# Patient Record
Sex: MALE | Race: BLACK OR AFRICAN AMERICAN | NOT HISPANIC OR LATINO | Employment: UNEMPLOYED | URBAN - METROPOLITAN AREA
[De-identification: names, ages, dates, MRNs, and addresses within clinical notes are randomized per-mention and may not be internally consistent; named-entity substitution may affect disease eponyms.]

---

## 2021-04-11 ENCOUNTER — HOSPITAL ENCOUNTER (OUTPATIENT)
Facility: HOSPITAL | Age: 51
Setting detail: OBSERVATION
Discharge: HOME | End: 2021-04-12
Attending: EMERGENCY MEDICINE | Admitting: FAMILY MEDICINE
Payer: COMMERCIAL

## 2021-04-11 DIAGNOSIS — I95.2 HYPOTENSION DUE TO DRUGS: ICD-10-CM

## 2021-04-11 DIAGNOSIS — N17.9 ACUTE-ON-CHRONIC KIDNEY INJURY (HCC): ICD-10-CM

## 2021-04-11 DIAGNOSIS — N18.9 ACUTE-ON-CHRONIC KIDNEY INJURY (HCC): ICD-10-CM

## 2021-04-11 DIAGNOSIS — I10 ESSENTIAL HYPERTENSION: ICD-10-CM

## 2021-04-11 DIAGNOSIS — N17.9 AKI (ACUTE KIDNEY INJURY) (HCC): ICD-10-CM

## 2021-04-11 DIAGNOSIS — R55 NEAR SYNCOPE: Primary | ICD-10-CM

## 2021-04-11 PROCEDURE — 99285 EMERGENCY DEPT VISIT HI MDM: CPT

## 2021-04-12 ENCOUNTER — APPOINTMENT (EMERGENCY)
Dept: RADIOLOGY | Facility: HOSPITAL | Age: 51
End: 2021-04-12
Payer: COMMERCIAL

## 2021-04-12 VITALS
SYSTOLIC BLOOD PRESSURE: 136 MMHG | OXYGEN SATURATION: 99 % | DIASTOLIC BLOOD PRESSURE: 89 MMHG | HEART RATE: 91 BPM | TEMPERATURE: 97.3 F | RESPIRATION RATE: 17 BRPM

## 2021-04-12 PROBLEM — N17.9 ACUTE-ON-CHRONIC KIDNEY INJURY (HCC): Status: ACTIVE | Noted: 2021-04-12

## 2021-04-12 PROBLEM — I10 ESSENTIAL HYPERTENSION: Status: ACTIVE | Noted: 2021-04-12

## 2021-04-12 PROBLEM — K21.9 GERD (GASTROESOPHAGEAL REFLUX DISEASE): Status: ACTIVE | Noted: 2021-04-12

## 2021-04-12 PROBLEM — I95.9 HYPOTENSION: Status: ACTIVE | Noted: 2021-04-12

## 2021-04-12 PROBLEM — N18.9 ACUTE-ON-CHRONIC KIDNEY INJURY (HCC): Status: ACTIVE | Noted: 2021-04-12

## 2021-04-12 LAB
ALBUMIN SERPL BCP-MCNC: 3.6 G/DL (ref 3.5–5)
ALBUMIN SERPL BCP-MCNC: 3.7 G/DL (ref 3.5–5)
ALP SERPL-CCNC: 60 U/L (ref 46–116)
ALP SERPL-CCNC: 63 U/L (ref 46–116)
ALT SERPL W P-5'-P-CCNC: 20 U/L (ref 12–78)
ALT SERPL W P-5'-P-CCNC: 22 U/L (ref 12–78)
ANION GAP SERPL CALCULATED.3IONS-SCNC: 10 MMOL/L (ref 4–13)
ANION GAP SERPL CALCULATED.3IONS-SCNC: 7 MMOL/L (ref 4–13)
AST SERPL W P-5'-P-CCNC: 11 U/L (ref 5–45)
AST SERPL W P-5'-P-CCNC: 21 U/L (ref 5–45)
ATRIAL RATE: 70 BPM
BACTERIA UR QL AUTO: NORMAL /HPF
BASOPHILS # BLD AUTO: 0.06 THOUSANDS/ΜL (ref 0–0.1)
BASOPHILS NFR BLD AUTO: 0 % (ref 0–1)
BILIRUB SERPL-MCNC: 0.49 MG/DL (ref 0.2–1)
BILIRUB SERPL-MCNC: 0.66 MG/DL (ref 0.2–1)
BILIRUB UR QL STRIP: NEGATIVE
BUN SERPL-MCNC: 24 MG/DL (ref 5–25)
BUN SERPL-MCNC: 24 MG/DL (ref 5–25)
CALCIUM SERPL-MCNC: 8.6 MG/DL (ref 8.3–10.1)
CALCIUM SERPL-MCNC: 9.5 MG/DL (ref 8.3–10.1)
CHLORIDE SERPL-SCNC: 102 MMOL/L (ref 100–108)
CHLORIDE SERPL-SCNC: 103 MMOL/L (ref 100–108)
CLARITY UR: CLEAR
CO2 SERPL-SCNC: 24 MMOL/L (ref 21–32)
CO2 SERPL-SCNC: 29 MMOL/L (ref 21–32)
COLOR UR: YELLOW
CREAT SERPL-MCNC: 1.61 MG/DL (ref 0.6–1.3)
CREAT SERPL-MCNC: 1.92 MG/DL (ref 0.6–1.3)
EOSINOPHIL # BLD AUTO: 0.66 THOUSAND/ΜL (ref 0–0.61)
EOSINOPHIL NFR BLD AUTO: 4 % (ref 0–6)
ERYTHROCYTE [DISTWIDTH] IN BLOOD BY AUTOMATED COUNT: 13.7 % (ref 11.6–15.1)
ERYTHROCYTE [DISTWIDTH] IN BLOOD BY AUTOMATED COUNT: 13.8 % (ref 11.6–15.1)
FINE GRAN CASTS URNS QL MICRO: NORMAL /LPF
GFR SERPL CREATININE-BSD FRML MDRD: 46 ML/MIN/1.73SQ M
GFR SERPL CREATININE-BSD FRML MDRD: 57 ML/MIN/1.73SQ M
GLUCOSE SERPL-MCNC: 105 MG/DL (ref 65–140)
GLUCOSE SERPL-MCNC: 120 MG/DL (ref 65–140)
GLUCOSE UR STRIP-MCNC: NEGATIVE MG/DL
HCT VFR BLD AUTO: 45 % (ref 36.5–49.3)
HCT VFR BLD AUTO: 47.3 % (ref 36.5–49.3)
HGB BLD-MCNC: 14.1 G/DL (ref 12–17)
HGB BLD-MCNC: 15.2 G/DL (ref 12–17)
HGB UR QL STRIP.AUTO: NEGATIVE
IMM GRANULOCYTES # BLD AUTO: 0.11 THOUSAND/UL (ref 0–0.2)
IMM GRANULOCYTES NFR BLD AUTO: 1 % (ref 0–2)
KETONES UR STRIP-MCNC: ABNORMAL MG/DL
LEUKOCYTE ESTERASE UR QL STRIP: NEGATIVE
LYMPHOCYTES # BLD AUTO: 3.21 THOUSANDS/ΜL (ref 0.6–4.47)
LYMPHOCYTES NFR BLD AUTO: 20 % (ref 14–44)
MCH RBC QN AUTO: 25.8 PG (ref 26.8–34.3)
MCH RBC QN AUTO: 26.2 PG (ref 26.8–34.3)
MCHC RBC AUTO-ENTMCNC: 31.3 G/DL (ref 31.4–37.4)
MCHC RBC AUTO-ENTMCNC: 32.1 G/DL (ref 31.4–37.4)
MCV RBC AUTO: 82 FL (ref 82–98)
MCV RBC AUTO: 82 FL (ref 82–98)
MONOCYTES # BLD AUTO: 1.51 THOUSAND/ΜL (ref 0.17–1.22)
MONOCYTES NFR BLD AUTO: 9 % (ref 4–12)
NEUTROPHILS # BLD AUTO: 10.89 THOUSANDS/ΜL (ref 1.85–7.62)
NEUTS SEG NFR BLD AUTO: 66 % (ref 43–75)
NITRITE UR QL STRIP: NEGATIVE
NON-SQ EPI CELLS URNS QL MICRO: NORMAL /HPF
NRBC BLD AUTO-RTO: 0 /100 WBCS
P AXIS: 74 DEGREES
PH UR STRIP.AUTO: 5.5 [PH] (ref 4.5–8)
PLATELET # BLD AUTO: 259 THOUSANDS/UL (ref 149–390)
PLATELET # BLD AUTO: 288 THOUSANDS/UL (ref 149–390)
PMV BLD AUTO: 10.2 FL (ref 8.9–12.7)
PMV BLD AUTO: 10.3 FL (ref 8.9–12.7)
POTASSIUM SERPL-SCNC: 4.6 MMOL/L (ref 3.5–5.3)
POTASSIUM SERPL-SCNC: 4.8 MMOL/L (ref 3.5–5.3)
PR INTERVAL: 214 MS
PROT SERPL-MCNC: 6.9 G/DL (ref 6.4–8.2)
PROT SERPL-MCNC: 7.3 G/DL (ref 6.4–8.2)
PROT UR STRIP-MCNC: >=300 MG/DL
QRS AXIS: 0 DEGREES
QRSD INTERVAL: 94 MS
QT INTERVAL: 412 MS
QTC INTERVAL: 444 MS
RBC # BLD AUTO: 5.47 MILLION/UL (ref 3.88–5.62)
RBC # BLD AUTO: 5.8 MILLION/UL (ref 3.88–5.62)
RBC #/AREA URNS AUTO: NORMAL /HPF
SODIUM SERPL-SCNC: 137 MMOL/L (ref 136–145)
SODIUM SERPL-SCNC: 138 MMOL/L (ref 136–145)
SP GR UR STRIP.AUTO: >=1.03 (ref 1–1.03)
T WAVE AXIS: 16 DEGREES
TROPONIN I SERPL-MCNC: <0.02 NG/ML
TROPONIN I SERPL-MCNC: <0.02 NG/ML
UROBILINOGEN UR QL STRIP.AUTO: 0.2 E.U./DL
VENTRICULAR RATE: 70 BPM
WBC # BLD AUTO: 16.4 THOUSAND/UL (ref 4.31–10.16)
WBC # BLD AUTO: 16.44 THOUSAND/UL (ref 4.31–10.16)
WBC #/AREA URNS AUTO: NORMAL /HPF

## 2021-04-12 PROCEDURE — 84484 ASSAY OF TROPONIN QUANT: CPT | Performed by: EMERGENCY MEDICINE

## 2021-04-12 PROCEDURE — 80053 COMPREHEN METABOLIC PANEL: CPT | Performed by: PHYSICIAN ASSISTANT

## 2021-04-12 PROCEDURE — 80053 COMPREHEN METABOLIC PANEL: CPT | Performed by: EMERGENCY MEDICINE

## 2021-04-12 PROCEDURE — 96365 THER/PROPH/DIAG IV INF INIT: CPT

## 2021-04-12 PROCEDURE — 85025 COMPLETE CBC W/AUTO DIFF WBC: CPT | Performed by: EMERGENCY MEDICINE

## 2021-04-12 PROCEDURE — 81001 URINALYSIS AUTO W/SCOPE: CPT

## 2021-04-12 PROCEDURE — 93005 ELECTROCARDIOGRAM TRACING: CPT

## 2021-04-12 PROCEDURE — 85027 COMPLETE CBC AUTOMATED: CPT | Performed by: PHYSICIAN ASSISTANT

## 2021-04-12 PROCEDURE — 99285 EMERGENCY DEPT VISIT HI MDM: CPT | Performed by: EMERGENCY MEDICINE

## 2021-04-12 PROCEDURE — 99236 HOSP IP/OBS SAME DATE HI 85: CPT | Performed by: FAMILY MEDICINE

## 2021-04-12 PROCEDURE — 36415 COLL VENOUS BLD VENIPUNCTURE: CPT | Performed by: EMERGENCY MEDICINE

## 2021-04-12 PROCEDURE — 71045 X-RAY EXAM CHEST 1 VIEW: CPT

## 2021-04-12 PROCEDURE — 93010 ELECTROCARDIOGRAM REPORT: CPT | Performed by: INTERNAL MEDICINE

## 2021-04-12 PROCEDURE — 96361 HYDRATE IV INFUSION ADD-ON: CPT

## 2021-04-12 RX ORDER — PANTOPRAZOLE SODIUM 40 MG/1
40 TABLET, DELAYED RELEASE ORAL
Status: DISCONTINUED | OUTPATIENT
Start: 2021-04-12 | End: 2021-04-12 | Stop reason: HOSPADM

## 2021-04-12 RX ORDER — ATORVASTATIN CALCIUM 40 MG/1
40 TABLET, FILM COATED ORAL
Status: DISCONTINUED | OUTPATIENT
Start: 2021-04-12 | End: 2021-04-12 | Stop reason: HOSPADM

## 2021-04-12 RX ORDER — ASPIRIN 81 MG/1
81 TABLET, CHEWABLE ORAL DAILY
Status: DISCONTINUED | OUTPATIENT
Start: 2021-04-12 | End: 2021-04-12 | Stop reason: HOSPADM

## 2021-04-12 RX ORDER — SPIRONOLACTONE 25 MG/1
50 TABLET ORAL DAILY
Refills: 0
Start: 2021-04-12

## 2021-04-12 RX ORDER — CARVEDILOL 25 MG/1
25 TABLET ORAL 2 TIMES DAILY WITH MEALS
Qty: 60 TABLET | Refills: 0 | Status: SHIPPED | OUTPATIENT
Start: 2021-04-12 | End: 2022-05-24

## 2021-04-12 RX ORDER — ALBUMIN, HUMAN INJ 5% 5 %
25 SOLUTION INTRAVENOUS ONCE
Status: COMPLETED | OUTPATIENT
Start: 2021-04-12 | End: 2021-04-12

## 2021-04-12 RX ORDER — LOSARTAN POTASSIUM AND HYDROCHLOROTHIAZIDE 12.5; 1 MG/1; MG/1
1 TABLET ORAL DAILY
Refills: 0
Start: 2021-04-12

## 2021-04-12 RX ORDER — LOSARTAN POTASSIUM 100 MG/1
100 TABLET ORAL EVERY MORNING
Refills: 0
Start: 2021-04-12 | End: 2021-04-12 | Stop reason: HOSPADM

## 2021-04-12 RX ORDER — SODIUM CHLORIDE 9 MG/ML
125 INJECTION, SOLUTION INTRAVENOUS CONTINUOUS
Status: DISCONTINUED | OUTPATIENT
Start: 2021-04-12 | End: 2021-04-12 | Stop reason: HOSPADM

## 2021-04-12 RX ORDER — SODIUM CHLORIDE 9 MG/ML
100 INJECTION, SOLUTION INTRAVENOUS CONTINUOUS
Status: DISPENSED | OUTPATIENT
Start: 2021-04-12 | End: 2021-04-12

## 2021-04-12 RX ORDER — HEPARIN SODIUM 5000 [USP'U]/ML
5000 INJECTION, SOLUTION INTRAVENOUS; SUBCUTANEOUS EVERY 8 HOURS SCHEDULED
Status: DISCONTINUED | OUTPATIENT
Start: 2021-04-12 | End: 2021-04-12 | Stop reason: HOSPADM

## 2021-04-12 RX ORDER — ONDANSETRON 2 MG/ML
4 INJECTION INTRAMUSCULAR; INTRAVENOUS EVERY 6 HOURS PRN
Status: DISCONTINUED | OUTPATIENT
Start: 2021-04-12 | End: 2021-04-12 | Stop reason: HOSPADM

## 2021-04-12 RX ORDER — MAGNESIUM HYDROXIDE/ALUMINUM HYDROXICE/SIMETHICONE 120; 1200; 1200 MG/30ML; MG/30ML; MG/30ML
30 SUSPENSION ORAL EVERY 6 HOURS PRN
Status: DISCONTINUED | OUTPATIENT
Start: 2021-04-12 | End: 2021-04-12 | Stop reason: HOSPADM

## 2021-04-12 RX ORDER — ACETAMINOPHEN 325 MG/1
650 TABLET ORAL EVERY 6 HOURS PRN
Status: DISCONTINUED | OUTPATIENT
Start: 2021-04-12 | End: 2021-04-12 | Stop reason: HOSPADM

## 2021-04-12 RX ADMIN — ALBUMIN (HUMAN) 25 G: 12.5 INJECTION, SOLUTION INTRAVENOUS at 02:15

## 2021-04-12 RX ADMIN — SODIUM CHLORIDE 1000 ML: 0.9 INJECTION, SOLUTION INTRAVENOUS at 00:38

## 2021-04-12 RX ADMIN — SODIUM CHLORIDE 100 ML/HR: 0.9 INJECTION, SOLUTION INTRAVENOUS at 05:33

## 2021-04-12 RX ADMIN — HEPARIN SODIUM 5000 UNITS: 5000 INJECTION INTRAVENOUS; SUBCUTANEOUS at 05:41

## 2021-04-12 RX ADMIN — MIDODRINE HYDROCHLORIDE 7.5 MG: 2.5 TABLET ORAL at 02:16

## 2021-04-12 RX ADMIN — PANTOPRAZOLE SODIUM 40 MG: 40 TABLET, DELAYED RELEASE ORAL at 05:41

## 2021-04-12 RX ADMIN — SODIUM CHLORIDE 1000 ML: 0.9 INJECTION, SOLUTION INTRAVENOUS at 01:39

## 2021-04-12 RX ADMIN — ASPIRIN 81 MG CHEWABLE TABLET 81 MG: 81 TABLET CHEWABLE at 09:23

## 2021-04-12 RX ADMIN — ACETAMINOPHEN 650 MG: 325 TABLET, FILM COATED ORAL at 09:23

## 2021-04-12 NOTE — QUICK NOTE
Pts BP increased to 134/96  Patient clinically feeling much better  Did not receive 500cc NS bolus yet  Will d/c 500cc bolus and midodrine at this time to see how patient's BP responds without alpha agonist support  Will give 500cc infusion at 100cc/hr instead  I suspect his liver has metabolized a majority of the sildenafil he ingested as the half life for sildenafil is 4 hours   Continue to monitor kidney function for resolution of BEN

## 2021-04-12 NOTE — ASSESSMENT & PLAN NOTE
Patient presents with hypotension 69/54, after taking sildenafil and carvedilol 50 mg  Reports blurry vision with bright lights and lightheadedness/presyncope  Also reports taking losartan-HCTZ 100-12 5mg, spironolactone 50 mg, nortriptyline 25mg, and ativan 0 5mg in the am   This is likely due to polypharmacy with multiple antihypertensives  No signs of infection  No known heart failure  With all interventions,such as fluid and midodrine as well holding meds that contribute to low BP, his BP was improved in 196Z/871G systolically  Patient has outpatient stress test and echocardiogram ordered  Patient is feeling well  Will discharge home

## 2021-04-12 NOTE — ASSESSMENT & PLAN NOTE
Lab Results   Component Value Date    EGFR 46 04/12/2021    CREATININE 1 92 (H) 04/12/2021   Baseline creatinine between 1 0 and 1 3  Currently 1 92  Patient also states he has a solitary left kidney     Likely prerenal in the setting of hypotension  Received 2 L normal saline, will give an additional 1 L for a total of 3 L  Continue midodrine to raise blood pressure  Follow BMP

## 2021-04-12 NOTE — ASSESSMENT & PLAN NOTE
Patient presents with hypotension 69/54, after taking sildenafil and carvedilol 50 mg  Reports blurry vision with bright lights and lightheadedness/presyncope  Also reports taking losartan-HCTZ 100-12 5mg, spironolactone 50 mg, and ativan 0 5mg in the am   This is likely due to polypharmacy with multiple antihypertensives  No signs of infection  No known heart failure  Receive 2 L and started on midodrine 7 5 mg q 8 H in the ED  BP now 88/49      Plan:  -Continue midodrine 7 5mg q8h  -Will give an additional 1L NS  -Monitor BP closely

## 2021-04-12 NOTE — H&P
[facility]  H&P- Aliyah Alexander 1970, 48 y o  male MRN: [id number]  Unit/Bed#: E4 -01 Encounter: [id number]  Primary Care Provider: Nahid Hein MD   Date and time admitted to hospital: 4/11/2021 11:57 PM    * Hypotension  Assessment & Plan  Patient presents with hypotension 69/54, after taking sildenafil and carvedilol 50 mg  Reports blurry vision with bright lights and lightheadedness/presyncope  Also reports taking losartan-HCTZ 100-12 5mg, spironolactone 50 mg, nortriptyline 25mg, and ativan 0 5mg in the am   This is likely due to polypharmacy with multiple antihypertensives  No signs of infection  No known heart failure  Receive 2 L and started on midodrine 7 5 mg q 8 H in the ED  BP now 88/49  Plan:  -Continue midodrine 7 5mg q8h  -Will give an additional 1L NS  -Monitor BP closely    Acute-on-chronic kidney injury [facility])  Assessment & Plan  Lab Results   Component Value Date    EGFR 46 04/12/2021    CREATININE 1 92 (H) 04/12/2021   Baseline creatinine between 1 0 and 1 3  Currently 1 92  Patient also states he has a solitary left kidney     Likely prerenal in the setting of hypotension  Received 2 L normal saline, will give an additional 1 L for a total of 3 L  Continue midodrine to raise blood pressure  Follow BMP at 0600 and again at 1200    Essential hypertension  Assessment & Plan  Home regimen:  Carvedilol 50 mg BID, losartan-HCTZ 100-12 5 mg daily, spironolactone 50 mg daily  Will hold all antihypertensives in the setting of hypotension and BEN    GERD (gastroesophageal reflux disease)  Assessment & Plan  Continue pantoprazole    VTE Prophylaxis: Heparin  / sequential compression device   Code Status:  Level 1 full code  POLST: There is no POLST form on file for this patient (pre-hospital)  Discussion with family:  Patient    Anticipated Length of Stay:  Patient will be admitted on an Observation basis with an anticipated length of stay of  less 2 midnights  Justification for Hospital Stay:  Hypertension in the setting of sildenafil and antihypertensives    Total Time for Visit, including Counseling / Coordination of Care: 30 minutes  Greater than 50% of this total time spent on direct patient counseling and coordination of care  Chief Complaint:   Hypertension    History of Present Illness:    Deborah Rosenberg is a 48 y o  male with PMH HTN, CKD with solitary kidney who presents with hypertension to 60s/50s following the use of sildenafil  Patient states that he took his carvedilol, Hyzaar, spironolactone this morning  He felt fine all day  Tonight, patient took his nightly dose of carvedilol along with sildenafil  Shortly after, patient started to have visual changes including blurry vision and bright lights  He also felt very lightheaded  He states he poured cold water bottles on his face which helped briefly with his vision, but then his visual changes returned a minute later  He was concerned he was going blind and called EMS  EMS found him hypotensive and brought into the ED  In the ED, blood pressure was 69/54  He was given 2 L bolus with minimal response  He was then started on midodrine 7 5 mg q 8 H and given albumin with mild response, BP now 80s over 60s  Lab work revealed BEN in the setting of hypotension  Patient denies chest pain, shortness of breath, nausea, vomiting, numbness, changes in bowel habits in urinary habits  Review of Systems:    Review of Systems   Constitutional: Positive for fatigue  Negative for appetite change, chills and fever  HENT: Negative for ear pain, sore throat and trouble swallowing  Eyes: Positive for visual disturbance (bright lights and blurry vision)  Respiratory: Negative for cough, chest tightness, shortness of breath and wheezing  Cardiovascular: Negative for chest pain, palpitations and leg swelling     Gastrointestinal: Negative for abdominal distention, abdominal pain, diarrhea, nausea and vomiting  Endocrine: Negative  Genitourinary: Negative for dysuria  Musculoskeletal: Negative for gait problem and myalgias  Skin: Negative for pallor  Allergic/Immunologic: Negative for immunocompromised state  Neurological: Positive for weakness and light-headedness  Negative for dizziness, syncope, numbness and headaches  Past Medical and Surgical History:     Past Medical History:   Diagnosis Date    Eosinophilic esophagitis     Hyperlipidemia     Hypertension        Past Surgical History:   Procedure Laterality Date    COLONOSCOPY  08/04/2015    Normal by Dr Ari Nguyen at [facility]  12/16/2016    Diverticulosis otherwise normal by Dr Hickman [facility] Right     Renal cell carcinoma    UPPER GASTROINTESTINAL ENDOSCOPY  08/2020    Biopsy positive for eosinophilic esophagitis       Meds/Allergies:    Prior to Admission medications    Medication Sig Start Date End Date Taking?  Authorizing Provider   amLODIPine (NORVASC) 5 mg tablet Take 5 mg by mouth daily 2/18/21 2/18/22 Yes Historical Provider, MD   ascorbic acid (VITAMIN C) 500 MG tablet Take 500 mg by mouth daily   Yes Historical Provider, MD   aspirin 81 mg chewable tablet Chew   Yes Historical Provider, MD   atorvastatin (LIPITOR) 40 mg tablet Take 40 mg by mouth daily   Yes Historical Provider, MD   carvedilol (COREG) 25 mg tablet Take 25 mg by mouth 8/21/20 8/21/21 Yes Historical Provider, MD   hydrochlorothiazide (HYDRODIURIL) 25 mg tablet Take 25 mg by mouth every morning 7/29/20  Yes Historical Provider, MD   levocetirizine (XYZAL) 5 MG tablet Take 5 mg by mouth every morning 7/29/20  Yes Historical Provider, MD   LORazepam (ATIVAN) 0 5 mg tablet  8/25/20  Yes Historical Provider, MD   losartan (COZAAR) 100 MG tablet Take 100 mg by mouth every morning 7/15/20  Yes Historical Provider, MD   nortriptyline (PAMELOR) 25 mg capsule Take 25 mg by mouth daily 8/5/20 8/5/21 Yes Historical Provider, MD   sildenafil (REVATIO) 20 mg tablet Take 20 mg by mouth daily   Yes Historical Provider, MD   cyclobenzaprine (FLEXERIL) 10 mg tablet Take 10 mg by mouth 3 (three) times a day as needed    Historical Provider, MD   linaCLOtide (Linzess) 290 MCG CAPS Take 290 mcg by mouth daily    Historical Provider, MD   losartan-hydrochlorothiazide (HYZAAR) 100-12 5 MG per tablet Take 1 tablet by mouth daily    Historical Provider, MD   nabumetone (RELAFEN) 750 mg tablet Take 750 mg by mouth daily    Historical Provider, MD   pantoprazole (PROTONIX) 40 mg tablet  8/21/20   Historical Provider, MD   phentermine (ADIPEX-P) 37 5 MG tablet Take 1 tablet by mouth daily    Historical Provider, MD   prochlorperazine (COMPAZINE) 10 mg tablet Take 10 mg by mouth daily as needed    Historical Provider, MD   rizatriptan (MAXALT) 10 MG tablet Take 10 mg by mouth daily as needed 8/5/20   Historical Provider, MD   Sodium Sulfate-Mag Sulfate-KCl (Sutab) 1390-261-100 MG TABS Take 1 kit by mouth see administration instructions Follow-up instructions given at office visit  Patient not taking: Reported on 4/12/2021 3/16/21   Sai KALYANI   ATORVASTATIN CALCIUM PO Take by mouth  4/12/21  Historical Provider, MD   LORAZEPAM PO Take by mouth  4/12/21  Historical Provider, MD   PANTOPRAZOLE SODIUM PO Take by mouth  4/12/21  Historical Provider, MD     I have reviewed home medications with patient personally  Allergies:    Allergies   Allergen Reactions    Sulfamethoxazole-Trimethoprim      Throat swelling      Latex        Social History:     Marital Status: Single   Occupation:  Noncontributory  Patient Pre-hospital Living Situation:  Home  Patient Pre-hospital Level of Mobility:  Independent  Patient Pre-hospital Diet Restrictions:  Low-salt  Substance Use History:   Social History     Substance and Sexual Activity   Alcohol Use Never    Frequency: Never     Social History     Tobacco Use   Smoking Status Current Every Day Smoker    Types: Cigarettes   Smokeless Tobacco Never Used     Social History     Substance and Sexual Activity   Drug Use Not Currently       Family History:    non-contributory    Physical Exam:     Vitals:   Blood Pressure: 134/96 (04/12/21 0511)  Pulse: 73 (04/12/21 0511)  Temperature: (!) 97 4 °F (36 3 °C) (04/12/21 0511)  Temp Source: Temporal (04/12/21 0511)  Respirations: 17 (04/12/21 0511)  SpO2: 94 % (04/12/21 0511)    Physical Exam  Vitals signs (hypotensive) and nursing note reviewed  Constitutional:       Appearance: He is obese  HENT:      Head: Normocephalic and atraumatic  Mouth/Throat:      Mouth: Mucous membranes are moist       Pharynx: Oropharynx is clear  No oropharyngeal exudate  Eyes:      Extraocular Movements: Extraocular movements intact  Cardiovascular:      Rate and Rhythm: Normal rate and regular rhythm  Pulses: Normal pulses  Heart sounds: Normal heart sounds  No murmur  No friction rub  No gallop  Pulmonary:      Effort: Pulmonary effort is normal  No respiratory distress  Breath sounds: Normal breath sounds  No stridor  No wheezing or rales  Abdominal:      General: Abdomen is flat  Bowel sounds are normal  There is no distension  Palpations: Abdomen is soft  Tenderness: There is no abdominal tenderness  Musculoskeletal:      Right lower leg: No edema  Left lower leg: No edema  Skin:     General: Skin is warm and dry  Neurological:      General: No focal deficit present  Mental Status: He is alert and oriented to person, place, and time  Additional Data:     Lab Results: I have personally reviewed pertinent reports        Results from last 7 days   Lab Units 04/12/21  0032   WBC Thousand/uL 16 44*   HEMOGLOBIN g/dL 15 2   HEMATOCRIT % 47 3   PLATELETS Thousands/uL 288   NEUTROS PCT % 66   LYMPHS PCT % 20   MONOS PCT % 9   EOS PCT % 4     Results from last 7 days   Lab Units 04/12/21  0032   SODIUM mmol/L 138 POTASSIUM mmol/L 4 6   CHLORIDE mmol/L 102   CO2 mmol/L 29   BUN mg/dL 24   CREATININE mg/dL 1 92*   ANION GAP mmol/L 7   CALCIUM mg/dL 9 5   ALBUMIN g/dL 3 6   TOTAL BILIRUBIN mg/dL 0 66   ALK PHOS U/L 63   ALT U/L 22   AST U/L 21   GLUCOSE RANDOM mg/dL 120                       Imaging: I have personally reviewed pertinent reports  XR chest 1 view portable   ED Interpretation by Jeannine Bourne PA-C (04/12 0057)   No acute cardiopulmonary disease          EKG, Pathology, and Other Studies Reviewed on Admission:   · EKG:  NSR, HR 74    Allscripts / Epic Records Reviewed: Yes     ** Please Note: This note has been constructed using a voice recognition system   **

## 2021-04-12 NOTE — PLAN OF CARE
Problem: Potential for Falls  Goal: Patient will remain free of falls  Description: INTERVENTIONS:  - Assess patient frequently for physical needs  -  Identify cognitive and physical deficits and behaviors that affect risk of falls    -  [place] fall precautions as indicated by assessment   - Educate patient/family on patient safety including physical limitations  - Instruct patient to call for assistance with activity based on assessment  - Modify environment to reduce risk of injury  - Consider OT/PT consult to assist with strengthening/mobility  Outcome: Progressing     Problem: PAIN - ADULT  Goal: Verbalizes/displays adequate comfort level or baseline comfort level  Description: Interventions:  - Encourage patient to monitor pain and request assistance  - Assess pain using appropriate pain scale  - Administer analgesics based on type and severity of pain and evaluate response  - Implement non-pharmacological measures as appropriate and evaluate response  - Consider cultural and social influences on pain and pain management  - Notify physician/advanced practitioner if interventions unsuccessful or patient reports new pain  Outcome: Progressing     Problem: INFECTION - ADULT  Goal: Absence or prevention of progression during hospitalization  Description: INTERVENTIONS:  - Assess and monitor for signs and symptoms of infection  - Monitor lab/diagnostic results  - Monitor all insertion sites, i e  indwelling lines, tubes, and drains  - Monitor endotracheal if appropriate and nasal secretions for changes in amount and color  - Howard appropriate cooling/warming therapies per order  - Administer medications as ordered  - Instruct and encourage patient and family to use good hand hygiene technique  - Identify and instruct in appropriate isolation precautions for identified infection/condition  Outcome: Progressing  Goal: Absence of fever/infection during neutropenic period  Description: INTERVENTIONS:  - Monitor WBC    Outcome: Progressing     Problem: SAFETY ADULT  Goal: Patient will remain free of falls  Description: INTERVENTIONS:  - Assess patient frequently for physical needs  -  Identify cognitive and physical deficits and behaviors that affect risk of falls    -  [place] fall precautions as indicated by assessment   - Educate patient/family on patient safety including physical limitations  - Instruct patient to call for assistance with activity based on assessment  - Modify environment to reduce risk of injury  - Consider OT/PT consult to assist with strengthening/mobility  Outcome: Progressing  Goal: Maintain or return to baseline ADL function  Description: INTERVENTIONS:  -  Assess patient's ability to carry out ADLs; assess patient's baseline for ADL function and identify physical deficits which impact ability to perform ADLs (bathing, care of mouth/teeth, toileting, grooming, dressing, etc )  - Assess/evaluate cause of self-care deficits   - Assess range of motion  - Assess patient's mobility; develop plan if impaired  - Assess patient's need for assistive devices and provide as appropriate  - Encourage maximum independence but intervene and supervise when necessary  - Involve family in performance of ADLs  - Assess for home care needs following discharge   - Consider OT consult to assist with ADL evaluation and planning for discharge  - Provide patient education as appropriate  Outcome: Progressing  Goal: Maintain or return mobility status to optimal level  Description: INTERVENTIONS:  - Assess patient's baseline mobility status (ambulation, transfers, stairs, etc )    - Identify cognitive and physical deficits and behaviors that affect mobility  - Identify mobility aids required to assist with transfers and/or ambulation (gait belt, sit-to-stand, lift, walker, cane, etc )  - [place] fall precautions as indicated by assessment  - Record patient progress and toleration of activity level on Mobility SBAR; progress patient to next Phase/Stage  - Instruct patient to call for assistance with activity based on assessment  - Consider rehabilitation consult to assist with strengthening/weightbearing, etc   Outcome: Progressing     Problem: DISCHARGE PLANNING  Goal: Discharge to home or other facility with appropriate resources  Description: INTERVENTIONS:  - Identify barriers to discharge w/patient and caregiver  - Arrange for needed discharge resources and transportation as appropriate  - Identify discharge learning needs (meds, wound care, etc )  - Arrange for interpretive services to assist at discharge as needed  - Refer to Case Management Department for coordinating discharge planning if the patient needs post-hospital services based on physician/advanced practitioner order or complex needs related to functional status, cognitive ability, or social support system  Outcome: Progressing     Problem: Knowledge Deficit  Goal: Patient/family/caregiver demonstrates understanding of disease process, treatment plan, medications, and discharge instructions  Description: Complete learning assessment and assess knowledge base    Interventions:  - Provide teaching at level of understanding  - Provide teaching via preferred learning methods  Outcome: Progressing     Problem: GASTROINTESTINAL - ADULT  Goal: Minimal or absence of nausea and/or vomiting  Description: INTERVENTIONS:  - Administer IV fluids if ordered to ensure adequate hydration  - Maintain NPO status until nausea and vomiting are resolved  - Nasogastric tube if ordered  - Administer ordered antiemetic medications as needed  - Provide nonpharmacologic comfort measures as appropriate  - Advance diet as tolerated, if ordered  - Consider nutrition services referral to assist patient with adequate nutrition and appropriate food choices  Outcome: Progressing     Problem: GENITOURINARY - ADULT  Goal: Maintains or returns to baseline urinary function  Description: INTERVENTIONS:  - Assess urinary function  - Encourage oral fluids to ensure adequate hydration if ordered  - Administer IV fluids as ordered to ensure adequate hydration  - Administer ordered medications as needed  - Offer frequent toileting  - Follow urinary retention protocol if ordered  Outcome: Progressing     Problem: METABOLIC, FLUID AND ELECTROLYTES - ADULT  Goal: Electrolytes maintained within normal limits  Description: INTERVENTIONS:  - Monitor labs and assess patient for signs and symptoms of electrolyte imbalances  - Administer electrolyte replacement as ordered  - Monitor response to electrolyte replacements, including repeat lab results as appropriate  - Instruct patient on fluid and nutrition as appropriate  Outcome: Progressing  Goal: Fluid balance maintained  Description: INTERVENTIONS:  - Monitor labs   - Monitor I/O and WT  - Instruct patient on fluid and nutrition as appropriate  - Assess for signs & symptoms of volume excess or deficit  Outcome: Progressing     Problem: HEMATOLOGIC - ADULT  Goal: Maintains hematologic stability  Description: INTERVENTIONS  - Assess for signs and symptoms of bleeding or hemorrhage  - Monitor labs  - Administer supportive blood products/factors as ordered and appropriate  Outcome: Progressing     Problem: MUSCULOSKELETAL - ADULT  Goal: Maintain or return mobility to safest level of function  Description: INTERVENTIONS:  - Assess patient's ability to carry out ADLs; assess patient's baseline for ADL function and identify physical deficits which impact ability to perform ADLs (bathing, care of mouth/teeth, toileting, grooming, dressing, etc )  - Assess/evaluate cause of self-care deficits   - Assess range of motion  - Assess patient's mobility  - Assess patient's need for assistive devices and provide as appropriate  - Encourage maximum independence but intervene and supervise when necessary  - Involve family in performance of ADLs  - Assess for home care needs following discharge   - Consider OT consult to assist with ADL evaluation and planning for discharge  - Provide patient education as appropriate  Outcome: Progressing  Goal: Maintain proper alignment of affected body part  Description: INTERVENTIONS:  - Support, maintain and protect limb and body alignment  - Provide patient/ family with appropriate education  Outcome: Progressing

## 2021-04-12 NOTE — DISCHARGE SUMMARY
[facility]  Discharge- Shiela Del Toro 1970, 48 y o  male MRN: [id number]  Unit/Bed#: E4 -01 Encounter: [id number]  Primary Care Provider: Deion Anne MD   Date and time admitted to hospital: 4/11/2021 11:57 PM    GERD (gastroesophageal reflux disease)  Assessment & Plan  Continue pantoprazole    Essential hypertension  Assessment & Plan  Home regimen:  Carvedilol 50 mg BID, losartan-HCTZ 100-12 5 mg daily, spironolactone 50 mg daily  Upon discharge, decreased Coreg to 25 mg b i d     Will continue Hyzaar and spironolactone after blood work comes back on 04/14  Acute-on-chronic kidney injury [facility])  Assessment & Plan  Lab Results   Component Value Date    EGFR 46 04/12/2021    CREATININE 1 92 (H) 04/12/2021   Baseline creatinine between 1 0 and 1 3  Currently improved at 1 6  Patient also states he has a solitary left kidney   Likely prerenal in the setting of hypotension  I recommended that patient hold nephrotoxic agents, such as Hyzaar and spironolactone and rechecks his BMP on 04/14  * Hypotension  Assessment & Plan  Patient presents with hypotension 69/54, after taking sildenafil and carvedilol 50 mg  Reports blurry vision with bright lights and lightheadedness/presyncope  Also reports taking losartan-HCTZ 100-12 5mg, spironolactone 50 mg, nortriptyline 25mg, and ativan 0 5mg in the am   This is likely due to polypharmacy with multiple antihypertensives  No signs of infection  No known heart failure  With all interventions,such as fluid and midodrine as well holding meds that contribute to low BP, his BP was improved in 442I/542L systolically  Patient has outpatient stress test and echocardiogram ordered  Patient is feeling well  Will discharge home          Discharge Summary - [facility]    Patient Information: Shiela Del Toro 48 y o  male MRN: [id number]  Unit/Bed#: E4 -01 Encounter: [id number]    Discharging Physician / Practitioner: Shira Madison MD  PCP: Nahid Hein MD  Admission Date: 4/11/2021  Discharge Date: 04/12/21    Reason for Admission: low BP    Discharge Diagnoses:     Principal Problem:    Hypotension  Active Problems:    Acute-on-chronic kidney injury ([address] )    Essential hypertension    GERD (gastroesophageal reflux disease)  Resolved Problems:    * No resolved hospital problems  *      Consultations During Hospital Stay:  · None    Procedures Performed:     · None    Significant Findings / Test Results:     · Creatinine: 1 9>1 6    Incidental Findings:   · WBC: 16 4    Test Results Pending at Discharge (will require follow up): · None     Outpatient Tests Requested:  · None    Complications:  None    Hospital Course:     Aliyah Alexander is a 48 y o  male patient who originally presented to the hospital on 4/11/2021 due to hypotension to 60s/50s following the use of sildenafil  Patient states that he took his carvedilol, Hyzaar, spironolactone this morning  He felt fine all day  Tonight, patient took his nightly dose of carvedilol along with sildenafil  Shortly after, patient started to have visual changes including blurry vision and bright lights  He also felt very lightheaded  He states he poured cold water bottles on his face which helped briefly with his vision, but then his visual changes returned a minute later  He was concerned he was going blind and called EMS  EMS found him hypotensive and brought into the ED  In the ED, blood pressure was 69/54  He was given 2 L bolus with minimal response  He was then started on midodrine 7 5 mg q 8 H and given albumin with mild response, BP now 80s over 60s  Lab work revealed BEN in the setting of hypotension  Patient denies chest pain, shortness of breath, nausea, vomiting, numbness, changes in bowel habits in urinary habits  This morning, patient is clinically improved  His blood pressure is much better in 130s -140s    He denies any chest pain, dizziness or fatigue  His urine output is good and his creatinine is improved at 1 6  I recommended patient follows up with his outpatient providers and keeps appointments for cardiac echo and stress test that are scheduled on 04/14  Also, recommended to hold all nephrotoxic agents, including Cozaar and spironolactone until his blood work comes back on 04/14  Condition at Discharge: stable     Discharge Day Visit / Exam:     Subjective:  Patient was seen and examined this morning  He feels well and denies any chest pain, weakness, dizziness or fatigue  Vitals: Blood Pressure: 136/89 (04/12/21 1100)  Pulse: 91 (04/12/21 1100)  Temperature: (!) 97 3 °F (36 3 °C) (04/12/21 0700)  Temp Source: Temporal (04/12/21 0700)  Respirations: 17 (04/12/21 1100)  SpO2: 99 % (04/12/21 0700)  Exam:   Physical Exam  Constitutional:       Appearance: He is well-developed  Cardiovascular:      Rate and Rhythm: Normal rate and regular rhythm  Heart sounds: Normal heart sounds  Pulmonary:      Effort: Pulmonary effort is normal  No respiratory distress  Breath sounds: Normal breath sounds  Abdominal:      Palpations: Abdomen is soft  Tenderness: There is no abdominal tenderness  Skin:     General: Skin is warm  Findings: No erythema or rash  Neurological:      Mental Status: He is alert  Discussion with Family:  No    Discharge instructions/Information to patient and family:   See after visit summary for information provided to patient and family  Provisions for Follow-Up Care:  See after visit summary for information related to follow-up care and any pertinent home health orders  Disposition:     Home    For Discharges to [facility] SNF:   · Not Applicable to this Patient - Not Applicable to this Patient    Planned Readmission: no     Discharge Statement:  I spent 40 minutes discharging the patient  This time was spent on the day of discharge   I had direct contact with the patient on the day of discharge  Greater than 50% of the total time was spent examining patient, answering all patient questions, arranging and discussing plan of care with patient as well as directly providing post-discharge instructions  Additional time then spent on discharge activities  Discharge Medications:  See after visit summary for reconciled discharge medications provided to patient and family        ** Please Note: This note has been constructed using a voice recognition system **

## 2021-04-12 NOTE — ED PROVIDER NOTES
History  Chief Complaint   Patient presents with    Medical Problem     Patient on multiple BP medications and took Sildenifil 60mg and had a near syncopal episode this evening  Patient was diaphoretic for EMS and presents to triage fatigued and hypotensive   Hypotension     Pt is a 48year old male with a PMH of non-obstructing CAD, cardiomyopathy, nephrectomy, hypertension, hyperlipidemia, hyperaldosteronism, renal cell carcinoma presenting with near syncope  Pt states around 2100 yesterday he took Viagra  Around 2230 he then took 2 doses of his carvedilol, protonix, spironolactone, and Pamelor  States shortly after that he was lying in bed to go to sleep when he began to have lightheadedness and the sensation of "passing out and flashing colors"  Pt states he did not have syncopal episode and stood up and splashed water onto his face  States EMS was called and since his symptoms have improved  Denies chest pain, palpitations, n/v  Pt noted to be hypotensive  History provided by:  Patient   used: No    Syncope  Episode history:  Single  Most recent episode: Today  Timing:  Rare  Progression:  Resolved  Chronicity:  New  Witnessed: yes    Relieved by:  Nothing  Worsened by:  Medication  Associated symptoms: no chest pain, no dizziness, no headaches, no palpitations and no weakness    Risk factors: coronary artery disease    Risk factors: no congenital heart disease, no seizures and no vascular disease        Prior to Admission Medications   Prescriptions Last Dose Informant Patient Reported? Taking?    LORazepam (ATIVAN) 0 5 mg tablet 4/11/2021 at Unknown time  Yes Yes   Sodium Sulfate-Mag Sulfate-KCl (Sutab) 0262-205-333 MG TABS Not Taking at Unknown time  No No   Sig: Take 1 kit by mouth see administration instructions Follow-up instructions given at office visit   Patient not taking: Reported on 4/12/2021   amLODIPine (NORVASC) 5 mg tablet 4/11/2021 at Unknown time  Yes Yes   Sig: Take 5 mg by mouth daily   ascorbic acid (VITAMIN C) 500 MG tablet 4/11/2021 at Unknown time  Yes Yes   Sig: Take 500 mg by mouth daily   aspirin 81 mg chewable tablet 4/11/2021 at Unknown time  Yes Yes   Sig: Chew   atorvastatin (LIPITOR) 40 mg tablet 4/11/2021 at Unknown time  Yes Yes   Sig: Take 40 mg by mouth daily   carvedilol (COREG) 25 mg tablet 4/11/2021 at Unknown time  Yes Yes   Sig: Take 25 mg by mouth   cyclobenzaprine (FLEXERIL) 10 mg tablet Unknown at Unknown time  Yes No   Sig: Take 10 mg by mouth 3 (three) times a day as needed   hydrochlorothiazide (HYDRODIURIL) 25 mg tablet 4/11/2021 at Unknown time  Yes Yes   Sig: Take 25 mg by mouth every morning   levocetirizine (XYZAL) 5 MG tablet 4/11/2021 at Unknown time  Yes Yes   Sig: Take 5 mg by mouth every morning   linaCLOtide (Linzess) 290 MCG CAPS Unknown at Unknown time  Yes No   Sig: Take 290 mcg by mouth daily   losartan (COZAAR) 100 MG tablet 4/11/2021 at Unknown time  Yes Yes   Sig: Take 100 mg by mouth every morning   losartan-hydrochlorothiazide (HYZAAR) 100-12 5 MG per tablet   Yes No   Sig: Take 1 tablet by mouth daily   nabumetone (RELAFEN) 750 mg tablet   Yes No   Sig: Take 750 mg by mouth daily   nortriptyline (PAMELOR) 25 mg capsule 4/11/2021 at Unknown time  Yes Yes   Sig: Take 25 mg by mouth daily   pantoprazole (PROTONIX) 40 mg tablet Unknown at Unknown time  Yes No   phentermine (ADIPEX-P) 37 5 MG tablet Not Taking at Unknown time  Yes No   Sig: Take 1 tablet by mouth daily   prochlorperazine (COMPAZINE) 10 mg tablet Not Taking at Unknown time  Yes No   Sig: Take 10 mg by mouth daily as needed   rizatriptan (MAXALT) 10 MG tablet Not Taking at Unknown time  Yes No   Sig: Take 10 mg by mouth daily as needed   sildenafil (REVATIO) 20 mg tablet 4/11/2021 at Unknown time  Yes Yes   Sig: Take 20 mg by mouth daily      Facility-Administered Medications: None       Past Medical History:   Diagnosis Date    Eosinophilic esophagitis     Hyperlipidemia     Hypertension        Past Surgical History:   Procedure Laterality Date    COLONOSCOPY  08/04/2015    Normal by Dr Nakita Marcano at [facility]  12/16/2016    Diverticulosis otherwise normal by Dr Nakita Marcano, [address] Right     Renal cell carcinoma    UPPER GASTROINTESTINAL ENDOSCOPY  08/2020    Biopsy positive for eosinophilic esophagitis       Family History   Problem Relation Age of Onset    Lymphoma Mother      I have reviewed and agree with the history as documented  E-Cigarette/Vaping    E-Cigarette Use Never User      E-Cigarette/Vaping Substances     Social History     Tobacco Use    Smoking status: Current Every Day Smoker     Types: Cigarettes    Smokeless tobacco: Never Used   Substance Use Topics    Alcohol use: Never     Frequency: Never    Drug use: Not Currently       Review of Systems   Constitutional: Negative  HENT: Negative  Respiratory: Negative  Cardiovascular: Positive for syncope  Negative for chest pain, palpitations and leg swelling  Gastrointestinal: Negative  Genitourinary: Negative  Musculoskeletal: Negative  Neurological: Positive for syncope (near syncope) and light-headedness  Negative for dizziness, speech difficulty, weakness, numbness and headaches  All other systems reviewed and are negative  Physical Exam  Physical Exam  Constitutional:       General: He is in acute distress  Appearance: He is well-developed  He is obese  He is not ill-appearing or diaphoretic  HENT:      Head: Normocephalic and atraumatic  Right Ear: External ear normal       Left Ear: External ear normal       Nose: Nose normal    Eyes:      Extraocular Movements: Extraocular movements intact  Conjunctiva/sclera: Conjunctivae normal       Pupils: Pupils are equal, round, and reactive to light  Neck:      Musculoskeletal: Normal range of motion and neck supple     Cardiovascular:      Rate and Rhythm: Normal rate and regular rhythm  Heart sounds: Normal heart sounds  Pulmonary:      Effort: Pulmonary effort is normal       Breath sounds: Normal breath sounds  Musculoskeletal: Normal range of motion  Skin:     General: Skin is warm and dry  Neurological:      General: No focal deficit present  Mental Status: He is alert and oriented to person, place, and time  Mental status is at baseline  Cranial Nerves: No cranial nerve deficit  Sensory: No sensory deficit  Motor: No weakness        Coordination: Coordination normal    Psychiatric:         Mood and Affect: Mood normal          Behavior: Behavior normal          Vital Signs  ED Triage Vitals   Temperature Pulse Respirations Blood Pressure SpO2   04/12/21 0009 04/12/21 0001 04/12/21 0001 04/12/21 0001 04/12/21 0001   (!) 93 °F (33 9 °C) 74 20 (!) 78/44 94 %      Temp Source Heart Rate Source Patient Position - Orthostatic VS BP Location FiO2 (%)   04/12/21 0001 04/12/21 0001 04/12/21 0001 04/12/21 0001 --   Oral Monitor Sitting Right arm       Pain Score       04/12/21 0257       3           Vitals:    04/12/21 0237 04/12/21 0257 04/12/21 0426 04/12/21 0511   BP: 108/59 (!) 88/49 102/60 134/96   Pulse: 76 78 78 73   Patient Position - Orthostatic VS:  Lying Lying Lying         Visual Acuity      ED Medications  Medications   sodium chloride 0 9 % infusion (0 mL/hr Intravenous Stopped 4/12/21 0534)   aspirin chewable tablet 81 mg (has no administration in time range)   atorvastatin (LIPITOR) tablet 40 mg (has no administration in time range)   pantoprazole (PROTONIX) EC tablet 40 mg (has no administration in time range)   sodium chloride 0 9 % infusion (100 mL/hr Intravenous New Bag 4/12/21 0533)   acetaminophen (TYLENOL) tablet 650 mg (has no administration in time range)   ondansetron (ZOFRAN) injection 4 mg (has no administration in time range)   aluminum-magnesium hydroxide-simethicone (MYLANTA) oral suspension 30 mL (has no administration in time range)   heparin (porcine) subcutaneous injection 5,000 Units (has no administration in time range)   sodium chloride 0 9 % bolus 1,000 mL (0 mL Intravenous Stopped 4/12/21 0139)   sodium chloride 0 9 % bolus 1,000 mL (0 mL Intravenous Stopped 4/12/21 0219)   albumin human (FLEXBUMIN) 5 % injection 25 g (0 g Intravenous Stopped 4/12/21 0532)       Diagnostic Studies  Results Reviewed     Procedure Component Value Units Date/Time    Troponin I [[id number]]  (Normal) Collected: 04/12/21 0257    Lab Status: Final result Specimen: Blood from Arm, Left Updated: 04/12/21 0324     Troponin I <0 02 ng/mL     Urine Microscopic [[id number]] Collected: 04/12/21 0108    Lab Status: Final result Specimen: Urine, Other Updated: 04/12/21 0244     RBC, UA None Seen /hpf      WBC, UA 2-4 /hpf      Epithelial Cells Occasional /hpf      Bacteria, UA None Seen /hpf      Fine granular casts 2-3 /lpf     Urine Macroscopic, POC [[id number]]  (Abnormal) Collected: 04/12/21 0108    Lab Status: Final result Specimen: Urine Updated: 04/12/21 0133     Color, UA Yellow     Clarity, UA Clear     pH, UA 5 5     Leukocytes, UA Negative     Nitrite, UA Negative     Protein, UA >=300 mg/dl      Glucose, UA Negative mg/dl      Ketones, UA Trace mg/dl      Urobilinogen, UA 0 2 E U /dl      Bilirubin, UA Negative     Blood, UA Negative     Specific Gravity, UA >=1 030    Narrative:      CLINITEK RESULT    Troponin I [[id number]]  (Normal) Collected: 04/12/21 0032    Lab Status: Final result Specimen: Blood from Hand, Left Updated: 04/12/21 0057     Troponin I <0 02 ng/mL     Comprehensive metabolic panel [[id number]]  (Abnormal) Collected: 04/12/21 0032    Lab Status: Final result Specimen: Blood from Hand, Left Updated: 04/12/21 0055     Sodium 138 mmol/L      Potassium 4 6 mmol/L      Chloride 102 mmol/L      CO2 29 mmol/L      ANION GAP 7 mmol/L      BUN 24 mg/dL      Creatinine 1 92 mg/dL      Glucose 120 mg/dL      Calcium 9 5 mg/dL      AST 21 U/L      ALT 22 U/L      Alkaline Phosphatase 63 U/L      Total Protein 7 3 g/dL      Albumin 3 6 g/dL      Total Bilirubin 0 66 mg/dL      eGFR 46 ml/min/1 73sq m     Narrative:      [place] guidelines for Chronic Kidney Disease (CKD):     Stage 1 with normal or high GFR (GFR > 90 mL/min/1 73 square meters)    Stage 2 Mild CKD (GFR = 60-89 mL/min/1 73 square meters)    Stage 3A Moderate CKD (GFR = 45-59 mL/min/1 73 square meters)    Stage 3B Moderate CKD (GFR = 30-44 mL/min/1 73 square meters)    Stage 4 Severe CKD (GFR = 15-29 mL/min/1 73 square meters)    Stage 5 End Stage CKD (GFR <15 mL/min/1 73 square meters)  Note: GFR calculation is accurate only with a steady state creatinine    CBC and differential [[id number]]  (Abnormal) Collected: 04/12/21 0032    Lab Status: Final result Specimen: Blood from Hand, Left Updated: 04/12/21 0038     WBC 16 44 Thousand/uL      RBC 5 80 Million/uL      Hemoglobin 15 2 g/dL      Hematocrit 47 3 %      MCV 82 fL      MCH 26 2 pg      MCHC 32 1 g/dL      RDW 13 7 %      MPV 10 2 fL      Platelets 801 Thousands/uL      nRBC 0 /100 WBCs      Neutrophils Relative 66 %      Immat GRANS % 1 %      Lymphocytes Relative 20 %      Monocytes Relative 9 %      Eosinophils Relative 4 %      Basophils Relative 0 %      Neutrophils Absolute 10 89 Thousands/µL      Immature Grans Absolute 0 11 Thousand/uL      Lymphocytes Absolute 3 21 Thousands/µL      Monocytes Absolute 1 51 Thousand/µL      Eosinophils Absolute 0 66 Thousand/µL      Basophils Absolute 0 06 Thousands/µL                  XR chest 1 view portable   ED Interpretation by Tala Mandujano PA-C (04/12 3490)   No acute cardiopulmonary disease                 Procedures  ECG 12 Lead Documentation Only    Date/Time: 4/12/2021 2:28 AM  Performed by: Tala Mandujano PA-C  Authorized by: Tala Mandujano PA-C     ECG reviewed by me, the ED Provider: yes    Patient location:  ED  Previous ECG:     Previous ECG:  Compared to current    Similarity:  No change    Comparison to cardiac monitor: No    Interpretation:     Interpretation: normal    Rate:     ECG rate:  70    ECG rate assessment: normal    Rhythm:     Rhythm: sinus rhythm    Ectopy:     Ectopy: none    QRS:     QRS axis:  Normal    QRS intervals:  Normal  Conduction:     Conduction: normal    ST segments:     ST segments:  Normal  T waves:     T waves: normal      ECG 12 Lead Documentation Only    Date/Time: 4/12/2021 3:07 AM  Performed by: Jennifer Quintero PA-C  Authorized by: Jennifer Quintero PA-C     ECG reviewed by me, the ED Provider: yes    Patient location:  ED  Previous ECG:     Previous ECG:  Compared to current    Similarity:  No change    Comparison to cardiac monitor: No    Interpretation:     Interpretation: normal    Rate:     ECG rate:  74    ECG rate assessment: normal    Rhythm:     Rhythm: sinus rhythm    Ectopy:     Ectopy: none    QRS:     QRS axis:  Normal    QRS intervals:  Normal  Conduction:     Conduction: normal    ST segments:     ST segments:  Normal  T waves:     T waves: normal               ED Course  ED Course as of Apr 12 0539   Mon Apr 12, 2021   0112 Pt presenting with near syncopal episode and hypotension  Pt admits to taking Viagra, and an hour late 2 carvedilol and spironolactone, as well as other night time medications  His EKG and troponin are negative  His BP continues to be low after 1 L, will give additional L and reassess  He does have leukocytosis and BEN, but denies infectious symptoms  XR negative, and urine pending  0239 Pt steadily improving  His heart score is 3, will delta EKG and troponin  0307 BP decreased again to 88/49  Spoke to [facility] about admission for near syncope and hypotension                   HEART Risk Score      Most Recent Value   Heart Score Risk Calculator   History  0 Filed at: 04/12/2021 0229   ECG  0 Filed at: 04/12/2021 4123   Age  1 Filed at: 04/12/2021 0615 Risk Factors  2 Filed at: 04/12/2021 0229   Troponin  0 Filed at: 04/12/2021 7488   HEART Score  3 Filed at: 04/12/2021 0132                      SBIRT 22yo+      Most Recent Value   SBIRT (23 yo +)   In order to provide better care to our patients, we are screening all of our patients for alcohol and drug use  Would it be okay to ask you these screening questions?   No Filed at: 04/12/2021 0011                    MDM  Number of Diagnoses or Management Options  BEN (acute kidney injury) [facility]): new and requires workup  Hypotension due to drugs: new and requires workup  Near syncope: new and requires workup     Amount and/or Complexity of Data Reviewed  Clinical lab tests: ordered and reviewed  Tests in the radiology section of CPT®: ordered and reviewed  Tests in the medicine section of CPT®: ordered and reviewed  Discussion of test results with the performing providers: yes  Discuss the patient with other providers: yes  Independent visualization of images, tracings, or specimens: yes    Risk of Complications, Morbidity, and/or Mortality  Presenting problems: high  Management options: high    Patient Progress  Patient progress: stable      Disposition  Final diagnoses:   Near syncope   Hypotension due to drugs   BEN (acute kidney injury) ([facility] [address] )     Time reflects when diagnosis was documented in both MDM as applicable and the Disposition within this note     Time User Action Codes Description Comment    4/12/2021  3:09 AM Yesenia [place] Add [R55] Near syncope     4/12/2021  3:09 AM Yesenia [place] Add [I95 2] Hypotension due to drugs     4/12/2021  5:37 AM Yesenia [place] Add [N17 9] BEN (acute kidney injury) [facility])       ED Disposition     ED Disposition Condition Date/Time Comment    Admit Stable Mon Apr 12, 2021  3:09 AM Case was discussed with PIERRE and the patient's admission status was agreed to be Admission Status: observation status to the service of Dr Yandel Gracia          Follow-up Information    None Current Discharge Medication List      CONTINUE these medications which have NOT CHANGED    Details   amLODIPine (NORVASC) 5 mg tablet Take 5 mg by mouth daily      ascorbic acid (VITAMIN C) 500 MG tablet Take 500 mg by mouth daily      aspirin 81 mg chewable tablet Chew      atorvastatin (LIPITOR) 40 mg tablet Take 40 mg by mouth daily      carvedilol (COREG) 25 mg tablet Take 25 mg by mouth      hydrochlorothiazide (HYDRODIURIL) 25 mg tablet Take 25 mg by mouth every morning      levocetirizine (XYZAL) 5 MG tablet Take 5 mg by mouth every morning      LORazepam (ATIVAN) 0 5 mg tablet       losartan (COZAAR) 100 MG tablet Take 100 mg by mouth every morning      nortriptyline (PAMELOR) 25 mg capsule Take 25 mg by mouth daily      sildenafil (REVATIO) 20 mg tablet Take 20 mg by mouth daily      cyclobenzaprine (FLEXERIL) 10 mg tablet Take 10 mg by mouth 3 (three) times a day as needed      linaCLOtide (Linzess) 290 MCG CAPS Take 290 mcg by mouth daily      losartan-hydrochlorothiazide (HYZAAR) 100-12 5 MG per tablet Take 1 tablet by mouth daily      nabumetone (RELAFEN) 750 mg tablet Take 750 mg by mouth daily      pantoprazole (PROTONIX) 40 mg tablet       phentermine (ADIPEX-P) 37 5 MG tablet Take 1 tablet by mouth daily      prochlorperazine (COMPAZINE) 10 mg tablet Take 10 mg by mouth daily as needed      rizatriptan (MAXALT) 10 MG tablet Take 10 mg by mouth daily as needed      Sodium Sulfate-Mag Sulfate-KCl (Sutab) 7248-927-177 MG TABS Take 1 kit by mouth see administration instructions Follow-up instructions given at office visit  Qty: 24 tablet, Refills: 0    Comments: Do not request PA/1 coupon as not covered COB BNA:[id number] PCN:[employer]:[id number] ID: [id number]  Associated Diagnoses: Change in bowel habits; Constipation, unspecified constipation type; Left upper quadrant abdominal pain; Left lower quadrant abdominal pain           No discharge procedures on file      PDMP Review     None          ED Provider  Electronically Signed by           Piyush Mathew PA-C  04/12/21 8411

## 2021-04-12 NOTE — ASSESSMENT & PLAN NOTE
Lab Results   Component Value Date    EGFR 46 04/12/2021    CREATININE 1 92 (H) 04/12/2021   Baseline creatinine between 1 0 and 1 3  Currently improved at 1 6  Patient also states he has a solitary left kidney   Likely prerenal in the setting of hypotension  I recommended that patient hold nephrotoxic agents, such as Hyzaar and spironolactone and rechecks his BMP on 04/14

## 2021-04-12 NOTE — ASSESSMENT & PLAN NOTE
Home regimen:  Carvedilol 50 mg BID, losartan-HCTZ 100-12 5 mg daily, spironolactone 50 mg daily  Will hold all antihypertensives in the setting of hypotension and BEN

## 2021-04-12 NOTE — PLAN OF CARE
Problem: Potential for Falls  Goal: Patient will remain free of falls  Description: INTERVENTIONS:  - Assess patient frequently for physical needs  -  Identify cognitive and physical deficits and behaviors that affect risk of falls    -  Oblong fall precautions as indicated by assessment   - Educate patient/family on patient safety including physical limitations  - Instruct patient to call for assistance with activity based on assessment  - Modify environment to reduce risk of injury  - Consider OT/PT consult to assist with strengthening/mobility  Outcome: Adequate for Discharge     Problem: PAIN - ADULT  Goal: Verbalizes/displays adequate comfort level or baseline comfort level  Description: Interventions:  - Encourage patient to monitor pain and request assistance  - Assess pain using appropriate pain scale  - Administer analgesics based on type and severity of pain and evaluate response  - Implement non-pharmacological measures as appropriate and evaluate response  - Consider cultural and social influences on pain and pain management  - Notify physician/advanced practitioner if interventions unsuccessful or patient reports new pain  Outcome: Adequate for Discharge     Problem: INFECTION - ADULT  Goal: Absence or prevention of progression during hospitalization  Description: INTERVENTIONS:  - Assess and monitor for signs and symptoms of infection  - Monitor lab/diagnostic results  - Monitor all insertion sites, i e  indwelling lines, tubes, and drains  - Monitor endotracheal if appropriate and nasal secretions for changes in amount and color  - Oblong appropriate cooling/warming therapies per order  - Administer medications as ordered  - Instruct and encourage patient and family to use good hand hygiene technique  - Identify and instruct in appropriate isolation precautions for identified infection/condition  Outcome: Adequate for Discharge  Goal: Absence of fever/infection during neutropenic period  Description: INTERVENTIONS:  - Monitor WBC    Outcome: Adequate for Discharge     Problem: SAFETY ADULT  Goal: Patient will remain free of falls  Description: INTERVENTIONS:  - Assess patient frequently for physical needs  -  Identify cognitive and physical deficits and behaviors that affect risk of falls    -  Tatamy fall precautions as indicated by assessment   - Educate patient/family on patient safety including physical limitations  - Instruct patient to call for assistance with activity based on assessment  - Modify environment to reduce risk of injury  - Consider OT/PT consult to assist with strengthening/mobility  Outcome: Adequate for Discharge  Goal: Maintain or return to baseline ADL function  Description: INTERVENTIONS:  -  Assess patient's ability to carry out ADLs; assess patient's baseline for ADL function and identify physical deficits which impact ability to perform ADLs (bathing, care of mouth/teeth, toileting, grooming, dressing, etc )  - Assess/evaluate cause of self-care deficits   - Assess range of motion  - Assess patient's mobility; develop plan if impaired  - Assess patient's need for assistive devices and provide as appropriate  - Encourage maximum independence but intervene and supervise when necessary  - Involve family in performance of ADLs  - Assess for home care needs following discharge   - Consider OT consult to assist with ADL evaluation and planning for discharge  - Provide patient education as appropriate  Outcome: Adequate for Discharge  Goal: Maintain or return mobility status to optimal level  Description: INTERVENTIONS:  - Assess patient's baseline mobility status (ambulation, transfers, stairs, etc )    - Identify cognitive and physical deficits and behaviors that affect mobility  - Identify mobility aids required to assist with transfers and/or ambulation (gait belt, sit-to-stand, lift, walker, cane, etc )  - Tatamy fall precautions as indicated by assessment  - Record patient progress and toleration of activity level on Mobility SBAR; progress patient to next Phase/Stage  - Instruct patient to call for assistance with activity based on assessment  - Consider rehabilitation consult to assist with strengthening/weightbearing, etc   Outcome: Adequate for Discharge     Problem: DISCHARGE PLANNING  Goal: Discharge to home or other facility with appropriate resources  Description: INTERVENTIONS:  - Identify barriers to discharge w/patient and caregiver  - Arrange for needed discharge resources and transportation as appropriate  - Identify discharge learning needs (meds, wound care, etc )  - Arrange for interpretive services to assist at discharge as needed  - Refer to Case Management Department for coordinating discharge planning if the patient needs post-hospital services based on physician/advanced practitioner order or complex needs related to functional status, cognitive ability, or social support system  Outcome: Adequate for Discharge     Problem: Knowledge Deficit  Goal: Patient/family/caregiver demonstrates understanding of disease process, treatment plan, medications, and discharge instructions  Description: Complete learning assessment and assess knowledge base    Interventions:  - Provide teaching at level of understanding  - Provide teaching via preferred learning methods  Outcome: Adequate for Discharge     Problem: GASTROINTESTINAL - ADULT  Goal: Minimal or absence of nausea and/or vomiting  Description: INTERVENTIONS:  - Administer IV fluids if ordered to ensure adequate hydration  - Maintain NPO status until nausea and vomiting are resolved  - Nasogastric tube if ordered  - Administer ordered antiemetic medications as needed  - Provide nonpharmacologic comfort measures as appropriate  - Advance diet as tolerated, if ordered  - Consider nutrition services referral to assist patient with adequate nutrition and appropriate food choices  Outcome: Adequate for Discharge     Problem: GENITOURINARY - ADULT  Goal: Maintains or returns to baseline urinary function  Description: INTERVENTIONS:  - Assess urinary function  - Encourage oral fluids to ensure adequate hydration if ordered  - Administer IV fluids as ordered to ensure adequate hydration  - Administer ordered medications as needed  - Offer frequent toileting  - Follow urinary retention protocol if ordered  Outcome: Adequate for Discharge     Problem: METABOLIC, FLUID AND ELECTROLYTES - ADULT  Goal: Electrolytes maintained within normal limits  Description: INTERVENTIONS:  - Monitor labs and assess patient for signs and symptoms of electrolyte imbalances  - Administer electrolyte replacement as ordered  - Monitor response to electrolyte replacements, including repeat lab results as appropriate  - Instruct patient on fluid and nutrition as appropriate  Outcome: Adequate for Discharge  Goal: Fluid balance maintained  Description: INTERVENTIONS:  - Monitor labs   - Monitor I/O and WT  - Instruct patient on fluid and nutrition as appropriate  - Assess for signs & symptoms of volume excess or deficit  Outcome: Adequate for Discharge     Problem: HEMATOLOGIC - ADULT  Goal: Maintains hematologic stability  Description: INTERVENTIONS  - Assess for signs and symptoms of bleeding or hemorrhage  - Monitor labs  - Administer supportive blood products/factors as ordered and appropriate  Outcome: Adequate for Discharge     Problem: MUSCULOSKELETAL - ADULT  Goal: Maintain or return mobility to safest level of function  Description: INTERVENTIONS:  - Assess patient's ability to carry out ADLs; assess patient's baseline for ADL function and identify physical deficits which impact ability to perform ADLs (bathing, care of mouth/teeth, toileting, grooming, dressing, etc )  - Assess/evaluate cause of self-care deficits   - Assess range of motion  - Assess patient's mobility  - Assess patient's need for assistive devices and provide as appropriate  - Encourage maximum independence but intervene and supervise when necessary  - Involve family in performance of ADLs  - Assess for home care needs following discharge   - Consider OT consult to assist with ADL evaluation and planning for discharge  - Provide patient education as appropriate  Outcome: Adequate for Discharge  Goal: Maintain proper alignment of affected body part  Description: INTERVENTIONS:  - Support, maintain and protect limb and body alignment  - Provide patient/ family with appropriate education  Outcome: Adequate for Discharge

## 2021-04-12 NOTE — ASSESSMENT & PLAN NOTE
Home regimen:  Carvedilol 50 mg BID, losartan-HCTZ 100-12 5 mg daily, spironolactone 50 mg daily  Upon discharge, decreased Coreg to 25 mg b i d     Will continue Hyzaar and spironolactone after blood work comes back on 04/14

## 2021-04-13 NOTE — NURSING NOTE
At discharge on 4/12/21 pt was provided written and verbal instructions on all medications and extensively reviewed with patient each medication he was prescribed and the administration schedule including holding his blood pressure medications specifically as he was advised to resume taking them on 4/14/21  Pt and wife at bedside verbalized understanding  Later in the evening around (1930), pt called the inpatient unit again with questions regarding his medications  Pt stated he was unsure of when he should be taking his medications, specifically his blood pressure medications  Medication administration schedule reinforced with patient  Pt asked what he should do if his blood pressure becomes elevated before 4/14/21  Advised patient that he should call primary care office in the morning (4/13/21) to discuss earlier resumption of antihypertensives  Pt put wife on the phone and medication schedule was again discussed with her including the recommendation to call PCP in AM  Pt and wife verbalized understanding  The next morning 4/13/21 at 0720, pt called the unit again asking for clarification on his blood pressure medication as his blood pressure was elevated  Pt advised to call PCP office for further management of blood pressure and follow up  Pt stated, "my doctor never calls me back"  Pt advised if he is concerned about his pressure and unable to reach his PCP then to seek evaluation at a urgent care center or emergency room

## 2021-05-13 ENCOUNTER — ANESTHESIA EVENT (OUTPATIENT)
Dept: GASTROENTEROLOGY | Facility: HOSPITAL | Age: 51
End: 2021-05-13

## 2021-05-14 ENCOUNTER — HOSPITAL ENCOUNTER (OUTPATIENT)
Dept: GASTROENTEROLOGY | Facility: HOSPITAL | Age: 51
Setting detail: OUTPATIENT SURGERY
Discharge: HOME | End: 2021-05-14
Attending: INTERNAL MEDICINE
Payer: COMMERCIAL

## 2021-05-14 ENCOUNTER — ANESTHESIA (OUTPATIENT)
Dept: GASTROENTEROLOGY | Facility: HOSPITAL | Age: 51
End: 2021-05-14

## 2021-05-14 VITALS
WEIGHT: 292 LBS | DIASTOLIC BLOOD PRESSURE: 51 MMHG | OXYGEN SATURATION: 100 % | BODY MASS INDEX: 43.25 KG/M2 | TEMPERATURE: 97 F | RESPIRATION RATE: 32 BRPM | HEART RATE: 91 BPM | SYSTOLIC BLOOD PRESSURE: 90 MMHG | HEIGHT: 69 IN

## 2021-05-14 DIAGNOSIS — K59.00 CONSTIPATION, UNSPECIFIED CONSTIPATION TYPE: ICD-10-CM

## 2021-05-14 DIAGNOSIS — R10.32 LEFT LOWER QUADRANT ABDOMINAL PAIN: ICD-10-CM

## 2021-05-14 DIAGNOSIS — R10.12 LEFT UPPER QUADRANT ABDOMINAL PAIN: ICD-10-CM

## 2021-05-14 PROBLEM — IMO0001 SMOKING: Status: ACTIVE | Noted: 2021-05-14

## 2021-05-14 PROBLEM — F17.200 SMOKING: Status: ACTIVE | Noted: 2021-05-14

## 2021-05-14 PROCEDURE — 45378 DIAGNOSTIC COLONOSCOPY: CPT | Performed by: INTERNAL MEDICINE

## 2021-05-14 RX ORDER — SODIUM CHLORIDE 9 MG/ML
125 INJECTION, SOLUTION INTRAVENOUS CONTINUOUS
Status: DISCONTINUED | OUTPATIENT
Start: 2021-05-14 | End: 2021-05-18 | Stop reason: HOSPADM

## 2021-05-14 RX ORDER — PROPOFOL 10 MG/ML
INJECTION, EMULSION INTRAVENOUS AS NEEDED
Status: DISCONTINUED | OUTPATIENT
Start: 2021-05-14 | End: 2021-05-14

## 2021-05-14 RX ADMIN — PROPOFOL 30 MG: 10 INJECTION, EMULSION INTRAVENOUS at 12:47

## 2021-05-14 RX ADMIN — SODIUM CHLORIDE 125 ML/HR: 0.9 INJECTION, SOLUTION INTRAVENOUS at 12:30

## 2021-05-14 RX ADMIN — PROPOFOL 50 MG: 10 INJECTION, EMULSION INTRAVENOUS at 12:44

## 2021-05-14 RX ADMIN — PROPOFOL 200 MG: 10 INJECTION, EMULSION INTRAVENOUS at 12:39

## 2021-05-14 NOTE — ANESTHESIA PREPROCEDURE EVALUATION
Procedure:  COLONOSCOPY    Relevant Problems   ANESTHESIA (within normal limits)      CARDIO   (+) Essential hypertension      GI/HEPATIC   (+) GERD (gastroesophageal reflux disease)      /RENAL   (+) Acute-on-chronic kidney injury (HCC)      PULMONARY   (+) Smoking        Physical Exam    Airway    Mallampati score: II  TM Distance: >3 FB  Neck ROM: full     Dental   No notable dental hx     Cardiovascular  Rhythm: regular, Rate: normal, Cardiovascular exam normal    Pulmonary  Pulmonary exam normal Breath sounds clear to auscultation,     Other Findings        Anesthesia Plan  ASA Score- 3     Anesthesia Type- general with ASA Monitors  Additional Monitors:   Airway Plan:           Plan Factors-    Chart reviewed  EKG reviewed  Existing labs reviewed  Patient is a current smoker  Patient instructed to abstain from smoking on day of procedure  Patient smoked on day of surgery  Induction- intravenous  Postoperative Plan-     Informed Consent- Anesthetic plan and risks discussed with patient

## 2021-05-14 NOTE — ANESTHESIA POSTPROCEDURE EVALUATION
Post-Op Assessment Note    CV Status:  Stable    Pain management: adequate     Mental Status:  Alert and awake   Hydration Status:  Euvolemic   PONV Controlled:  Controlled   Airway Patency:  Patent   Two or more mitigation strategies used for obstructive sleep apnea   Post Op Vitals Reviewed: Yes      Staff: Anesthesiologist, with CRNAs         No complications documented      BP      Temp     Pulse     Resp     SpO2

## 2021-05-14 NOTE — DISCHARGE INSTRUCTIONS
Please call [phone] with any problems  Your examination today was normal   I would suggest she stay on the previously suggested MiraLax in juice to keep her bowels moving  If your pain is recurrent should be seen back in the office  I would suggest standard colon rectal cancer screening which should include stool Hemoccult yearly and a colonoscopy in 10 years unless your history or family history changes  Colonoscopy   WHAT YOU NEED TO KNOW:   A colonoscopy is a procedure to examine the inside of your colon (intestine) with a scope  Polyps or tissue growths may have been removed during your colonoscopy  It is normal to feel bloated and to have some abdominal discomfort  You should be passing gas  If you have hemorrhoids or you had polyps removed, you may have a small amount of bleeding  DISCHARGE INSTRUCTIONS:   Call your doctor if:   · You have a large amount of bright red blood in your bowel movements  · Your abdomen is hard and firm and you have severe pain  · You have sudden trouble breathing  · You develop a rash or hives  · You have a fever within 24 hours of your procedure  · You have not had a bowel movement for 3 days after your procedure  · You have questions or concerns about your condition or care  After your colonoscopy:   · Do not lift, strain, or run  for 3 days  · Rest as much as possible  You have been given medicine to relax you  Do not  drive or make important decisions for at least 24 hours  Return to your normal activity as directed  · Relieve gas and discomfort from bloating  by lying on your left side with a heating pad on your abdomen  You may need to take short walks to help the gas move out  Eat small meals until bloating is relieved  If you had polyps removed: For 7 days after your procedure:  · Do not  take aspirin  · Do not  go on long car rides  Help prevent constipation:   · Eat a variety of healthy foods    Healthy foods include fruit, vegetables, whole-grain breads, low-fat dairy products, beans, lean meat, and fish  Ask if you need to be on a special diet  Your healthcare provider may recommend that you eat high-fiber foods such as cooked beans  Fiber helps you have regular bowel movements  · Drink liquids as directed  Adults should drink between 9 and 13 eight-ounce cups of liquid every day  Ask what amount is best for you  For most people, good liquids to drink are water, juice, and milk  · Exercise as directed  Talk to your healthcare provider about the best exercise plan for you  Exercise can help prevent constipation, decrease your blood pressure and improve your health  Follow up with your healthcare provider as directed:  Write down your questions so you remember to ask them during your visits  © Copyright [address] Information is for End User's use only and may not be sold, redistributed or otherwise used for commercial purposes  All illustrations and images included in CareNotes® are the copyrighted property of [employer]  or [address] [place], [place] [address]kacie modesta   The above information is an  only  It is not intended as medical advice for individual conditions or treatments  Talk to your doctor, nurse or pharmacist before following any medical regimen to see if it is safe and effective for you

## 2021-05-14 NOTE — INTERVAL H&P NOTE
H&P reviewed  After examining the patient I find no changes in the patients condition since the H&P had been written      Vitals:    05/14/21 1202   BP: 131/82   Pulse: (!) 53   Resp: 20   Temp: (!) 97 °F (36 1 °C)   SpO2: 97%

## 2021-05-27 PROBLEM — E66.01 CLASS 3 SEVERE OBESITY DUE TO EXCESS CALORIES WITH SERIOUS COMORBIDITY IN ADULT (HCC): Status: ACTIVE | Noted: 2021-05-27

## 2021-05-27 PROBLEM — C64.1 RENAL CELL CARCINOMA OF RIGHT KIDNEY (HCC): Status: ACTIVE | Noted: 2021-05-27

## 2021-05-27 PROBLEM — E66.813 CLASS 3 SEVERE OBESITY DUE TO EXCESS CALORIES WITH SERIOUS COMORBIDITY IN ADULT: Status: ACTIVE | Noted: 2021-05-27

## 2021-07-01 ENCOUNTER — HOSPITAL ENCOUNTER (EMERGENCY)
Facility: HOSPITAL | Age: 51
Discharge: HOME | End: 2021-07-01
Attending: EMERGENCY MEDICINE | Admitting: EMERGENCY MEDICINE
Payer: COMMERCIAL

## 2021-07-01 ENCOUNTER — APPOINTMENT (EMERGENCY)
Dept: CT IMAGING | Facility: HOSPITAL | Age: 51
End: 2021-07-01
Payer: COMMERCIAL

## 2021-07-01 ENCOUNTER — APPOINTMENT (EMERGENCY)
Dept: RADIOLOGY | Facility: HOSPITAL | Age: 51
End: 2021-07-01
Payer: COMMERCIAL

## 2021-07-01 VITALS
HEART RATE: 87 BPM | WEIGHT: 288.8 LBS | BODY MASS INDEX: 43.91 KG/M2 | RESPIRATION RATE: 18 BRPM | OXYGEN SATURATION: 99 % | SYSTOLIC BLOOD PRESSURE: 133 MMHG | TEMPERATURE: 98.4 F | DIASTOLIC BLOOD PRESSURE: 85 MMHG

## 2021-07-01 DIAGNOSIS — R10.13 EPIGASTRIC PAIN: ICD-10-CM

## 2021-07-01 DIAGNOSIS — R53.1 WEAKNESS: ICD-10-CM

## 2021-07-01 DIAGNOSIS — R07.9 CHEST PAIN: ICD-10-CM

## 2021-07-01 DIAGNOSIS — R53.83 FATIGUE: Primary | ICD-10-CM

## 2021-07-01 LAB
ALBUMIN SERPL BCP-MCNC: 3.8 G/DL (ref 3.5–5)
ALP SERPL-CCNC: 73 U/L (ref 46–116)
ALT SERPL W P-5'-P-CCNC: 34 U/L (ref 12–78)
ANION GAP SERPL CALCULATED.3IONS-SCNC: 7 MMOL/L (ref 4–13)
AST SERPL W P-5'-P-CCNC: 10 U/L (ref 5–45)
ATRIAL RATE: 87 BPM
BACTERIA UR QL AUTO: ABNORMAL /HPF
BASOPHILS # BLD AUTO: 0.06 THOUSANDS/ΜL (ref 0–0.1)
BASOPHILS NFR BLD AUTO: 0 % (ref 0–1)
BILIRUB SERPL-MCNC: 0.75 MG/DL (ref 0.2–1)
BILIRUB UR QL STRIP: NEGATIVE
BUN SERPL-MCNC: 19 MG/DL (ref 5–25)
CALCIUM SERPL-MCNC: 9.8 MG/DL (ref 8.3–10.1)
CHLORIDE SERPL-SCNC: 103 MMOL/L (ref 100–108)
CK SERPL-CCNC: 49 U/L (ref 39–308)
CLARITY UR: CLEAR
CO2 SERPL-SCNC: 27 MMOL/L (ref 21–32)
COLOR UR: YELLOW
CREAT SERPL-MCNC: 1.3 MG/DL (ref 0.6–1.3)
EOSINOPHIL # BLD AUTO: 0.63 THOUSAND/ΜL (ref 0–0.61)
EOSINOPHIL NFR BLD AUTO: 4 % (ref 0–6)
ERYTHROCYTE [DISTWIDTH] IN BLOOD BY AUTOMATED COUNT: 14.3 % (ref 11.6–15.1)
GFR SERPL CREATININE-BSD FRML MDRD: 74 ML/MIN/1.73SQ M
GLUCOSE SERPL-MCNC: 112 MG/DL (ref 65–140)
GLUCOSE UR STRIP-MCNC: NEGATIVE MG/DL
HCT VFR BLD AUTO: 50.1 % (ref 36.5–49.3)
HGB BLD-MCNC: 16 G/DL (ref 12–17)
HGB UR QL STRIP.AUTO: NEGATIVE
IMM GRANULOCYTES # BLD AUTO: 0.14 THOUSAND/UL (ref 0–0.2)
IMM GRANULOCYTES NFR BLD AUTO: 1 % (ref 0–2)
KETONES UR STRIP-MCNC: NEGATIVE MG/DL
LEUKOCYTE ESTERASE UR QL STRIP: NEGATIVE
LIPASE SERPL-CCNC: 234 U/L (ref 73–393)
LYMPHOCYTES # BLD AUTO: 3.05 THOUSANDS/ΜL (ref 0.6–4.47)
LYMPHOCYTES NFR BLD AUTO: 19 % (ref 14–44)
MCH RBC QN AUTO: 26.4 PG (ref 26.8–34.3)
MCHC RBC AUTO-ENTMCNC: 31.9 G/DL (ref 31.4–37.4)
MCV RBC AUTO: 83 FL (ref 82–98)
MONOCYTES # BLD AUTO: 1.38 THOUSAND/ΜL (ref 0.17–1.22)
MONOCYTES NFR BLD AUTO: 9 % (ref 4–12)
NEUTROPHILS # BLD AUTO: 10.71 THOUSANDS/ΜL (ref 1.85–7.62)
NEUTS SEG NFR BLD AUTO: 67 % (ref 43–75)
NITRITE UR QL STRIP: NEGATIVE
NON-SQ EPI CELLS URNS QL MICRO: ABNORMAL /HPF
NRBC BLD AUTO-RTO: 0 /100 WBCS
NT-PROBNP SERPL-MCNC: 5 PG/ML
P AXIS: 69 DEGREES
PH UR STRIP.AUTO: 5.5 [PH] (ref 4.5–8)
PLATELET # BLD AUTO: 317 THOUSANDS/UL (ref 149–390)
PMV BLD AUTO: 9.5 FL (ref 8.9–12.7)
POTASSIUM SERPL-SCNC: 4.5 MMOL/L (ref 3.5–5.3)
PR INTERVAL: 188 MS
PROT SERPL-MCNC: 7.9 G/DL (ref 6.4–8.2)
PROT UR STRIP-MCNC: ABNORMAL MG/DL
QRS AXIS: 35 DEGREES
QRSD INTERVAL: 88 MS
QT INTERVAL: 352 MS
QTC INTERVAL: 423 MS
RBC # BLD AUTO: 6.07 MILLION/UL (ref 3.88–5.62)
RBC #/AREA URNS AUTO: ABNORMAL /HPF
SODIUM SERPL-SCNC: 137 MMOL/L (ref 136–145)
SP GR UR STRIP.AUTO: 1.02 (ref 1–1.03)
T WAVE AXIS: 31 DEGREES
TROPONIN I SERPL-MCNC: <0.02 NG/ML
TSH SERPL DL<=0.05 MIU/L-ACNC: 1.33 UIU/ML (ref 0.36–3.74)
UROBILINOGEN UR QL STRIP.AUTO: 0.2 E.U./DL
VENTRICULAR RATE: 87 BPM
WBC # BLD AUTO: 15.97 THOUSAND/UL (ref 4.31–10.16)
WBC #/AREA URNS AUTO: ABNORMAL /HPF

## 2021-07-01 PROCEDURE — 84484 ASSAY OF TROPONIN QUANT: CPT | Performed by: PHYSICIAN ASSISTANT

## 2021-07-01 PROCEDURE — 82550 ASSAY OF CK (CPK): CPT | Performed by: PHYSICIAN ASSISTANT

## 2021-07-01 PROCEDURE — 83880 ASSAY OF NATRIURETIC PEPTIDE: CPT | Performed by: PHYSICIAN ASSISTANT

## 2021-07-01 PROCEDURE — 71045 X-RAY EXAM CHEST 1 VIEW: CPT

## 2021-07-01 PROCEDURE — 81001 URINALYSIS AUTO W/SCOPE: CPT

## 2021-07-01 PROCEDURE — 85025 COMPLETE CBC W/AUTO DIFF WBC: CPT | Performed by: PHYSICIAN ASSISTANT

## 2021-07-01 PROCEDURE — 80053 COMPREHEN METABOLIC PANEL: CPT | Performed by: PHYSICIAN ASSISTANT

## 2021-07-01 PROCEDURE — 71260 CT THORAX DX C+: CPT

## 2021-07-01 PROCEDURE — 84443 ASSAY THYROID STIM HORMONE: CPT | Performed by: PHYSICIAN ASSISTANT

## 2021-07-01 PROCEDURE — 96360 HYDRATION IV INFUSION INIT: CPT

## 2021-07-01 PROCEDURE — 74177 CT ABD & PELVIS W/CONTRAST: CPT

## 2021-07-01 PROCEDURE — 83690 ASSAY OF LIPASE: CPT | Performed by: PHYSICIAN ASSISTANT

## 2021-07-01 PROCEDURE — 93010 ELECTROCARDIOGRAM REPORT: CPT | Performed by: INTERNAL MEDICINE

## 2021-07-01 PROCEDURE — 36415 COLL VENOUS BLD VENIPUNCTURE: CPT | Performed by: PHYSICIAN ASSISTANT

## 2021-07-01 PROCEDURE — 93005 ELECTROCARDIOGRAM TRACING: CPT

## 2021-07-01 PROCEDURE — 96361 HYDRATE IV INFUSION ADD-ON: CPT

## 2021-07-01 PROCEDURE — 99285 EMERGENCY DEPT VISIT HI MDM: CPT | Performed by: PHYSICIAN ASSISTANT

## 2021-07-01 PROCEDURE — 99284 EMERGENCY DEPT VISIT MOD MDM: CPT

## 2021-07-01 RX ADMIN — IOHEXOL 100 ML: 350 INJECTION, SOLUTION INTRAVENOUS at 16:34

## 2021-07-01 RX ADMIN — SODIUM CHLORIDE 1000 ML: 0.9 INJECTION, SOLUTION INTRAVENOUS at 13:41

## 2021-07-01 NOTE — Clinical Note
Mendez Ildefonso was seen and treated in our emergency department on 7/1/2021  Diagnosis:     Joselyn Bashir  may return to work on return date  He may return on this date: 07/05/2021         If you have any questions or concerns, please don't hesitate to call        Melissa Sanchez PA-C    ______________________________           _______________          _______________  Hospital Representative                              Date                                Time

## 2021-07-01 NOTE — ED PROVIDER NOTES
History  Chief Complaint   Patient presents with    Fatigue     fatigue, muscle spasms, "not feeling myself" ongoing for 4 days  taking tylenol no relief  c/o sob denies fevers or cp      Patient is a 45 y/o male with hx of HTN, HLD, renal CA (s/p right nephrectomy), migraine headaches, eosinophilic esophagitis, presents to the ED for evaluation of generalized weakness, fatigue, chest pain, epigastric pain, lightheadedness  Pt states for the past 4 days he has been having gradually worsening symptoms and just "not feeling himself"  Pt denies sick contacts or recent travel  Pt has been taking tylenol without improvement in symptoms  Pt denies headaches, states he has been feeling lightheaded for past 4 months, did see Neurology, had MRI brain on 4/16 which showed No intracranial hemorrhage or mass  No evidence of sequelae intracranial trauma  Pt states he has been having intermittent episodes of left sided chest pain and "spasms" in epigastric area  Pt denies fever, night sweats, unexpected weight gain/loss, leg pain/swelling, palpitations, cough, vision changes, focal weakness, sensory deficit, facial asymmetry, speech changes  Prior to Admission Medications   Prescriptions Last Dose Informant Patient Reported? Taking?    LORazepam (ATIVAN) 0 5 mg tablet   Yes No   ascorbic acid (VITAMIN C) 500 MG tablet   Yes No   Sig: Take 500 mg by mouth daily   aspirin 81 mg chewable tablet   Yes No   Sig: Chew   atorvastatin (LIPITOR) 40 mg tablet   Yes No   Sig: Take 40 mg by mouth daily   carvedilol (COREG) 25 mg tablet   No No   Sig: Take 1 tablet (25 mg total) by mouth 2 (two) times a day with meals   cyclobenzaprine (FLEXERIL) 10 mg tablet   Yes No   Sig: Take 10 mg by mouth 3 (three) times a day as needed   levocetirizine (XYZAL) 5 MG tablet   Yes No   Sig: Take 5 mg by mouth every morning   linaCLOtide (Linzess) 290 MCG CAPS   Yes No   Sig: Take 290 mcg by mouth daily   losartan-hydrochlorothiazide (HYZAAR) 100-12 5 MG per tablet   No No   Sig: Take 1 tablet by mouth daily Resume on 4/14   nortriptyline (PAMELOR) 25 mg capsule   Yes No   Sig: Take 25 mg by mouth daily   pantoprazole (PROTONIX) 20 mg tablet   No No   Sig: Take 1 tablet (20 mg total) by mouth daily   pantoprazole (PROTONIX) 40 mg tablet   Yes No   spironolactone (ALDACTONE) 25 mg tablet   No No   Sig: Take 2 tablets (50 mg total) by mouth daily Resume on 4/14      Facility-Administered Medications: None       Past Medical History:   Diagnosis Date    Eosinophilic esophagitis     History of kidney cancer     2007    Hyperlipidemia     Hypertension     Renal disorder        Past Surgical History:   Procedure Laterality Date    COLONOSCOPY  08/04/2015    Normal by Dr Caralee Essex at [address] [place] [address]  12/16/2016    Diverticulosis otherwise normal by Dr Caralee Essex, [address] [place] [address] COLONOSCOPY  05/14/2021    normal by Dr Sully Joseph Right     Renal cell carcinoma    UPPER GASTROINTESTINAL ENDOSCOPY  08/2020    Biopsy positive for eosinophilic esophagitis       Family History   Problem Relation Age of Onset    Lymphoma Mother      I have reviewed and agree with the history as documented  E-Cigarette/Vaping    E-Cigarette Use Never User      E-Cigarette/Vaping Substances     Social History     Tobacco Use    Smoking status: Current Every Day Smoker     Packs/day: 0 25     Types: Cigarettes    Smokeless tobacco: Never Used   Vaping Use    Vaping Use: Never used   Substance Use Topics    Alcohol use: Never    Drug use: Not Currently       Review of Systems   Constitutional: Positive for fatigue  Negative for chills and fever  HENT: Negative for congestion, ear pain and sore throat  Eyes: Negative for visual disturbance  Respiratory: Positive for shortness of breath  Negative for cough  Cardiovascular: Positive for chest pain  Negative for palpitations and leg swelling     Gastrointestinal: Positive for abdominal pain (epigastric muscle spasm)  Negative for diarrhea, nausea and vomiting  Genitourinary: Negative for dysuria and hematuria  Musculoskeletal: Negative for back pain, gait problem and neck pain  Skin: Negative for rash  Neurological: Positive for weakness (generalized) and light-headedness  Negative for dizziness, tremors, seizures, syncope, facial asymmetry, speech difficulty, numbness and headaches  Psychiatric/Behavioral: Negative for confusion  Physical Exam  Physical Exam  Constitutional:       General: He is not in acute distress  Appearance: He is well-developed  He is not ill-appearing, toxic-appearing or diaphoretic  HENT:      Head: Normocephalic and atraumatic  Right Ear: External ear normal       Left Ear: External ear normal       Nose: Nose normal       Mouth/Throat:      Lips: Pink  Mouth: Mucous membranes are moist    Eyes:      General: Vision grossly intact  Extraocular Movements: Extraocular movements intact  Conjunctiva/sclera: Conjunctivae normal       Pupils: Pupils are equal, round, and reactive to light  Cardiovascular:      Rate and Rhythm: Normal rate and regular rhythm  Pulmonary:      Effort: Pulmonary effort is normal  No tachypnea or respiratory distress  Breath sounds: Normal breath sounds  No decreased breath sounds, wheezing, rhonchi or rales  Musculoskeletal:      Cervical back: Normal range of motion and neck supple  Right lower leg: Normal  No swelling, tenderness or bony tenderness  No edema  Left lower leg: Normal  No swelling, tenderness or bony tenderness  No edema  Skin:     General: Skin is warm and dry  Capillary Refill: Capillary refill takes less than 2 seconds  Neurological:      General: No focal deficit present  Mental Status: He is alert and oriented to person, place, and time  GCS: GCS eye subscore is 4  GCS verbal subscore is 5  GCS motor subscore is 6        Sensory: Sensation is intact  Motor: Motor function is intact  Coordination: Coordination is intact  Gait: Gait is intact  Comments: NIHSS = 0   GCS 15  AAOx4  No focal neurological deficits  PERRL  EOMI  No pronator drift   strength 5/5 bilaterally  B/L UE strength 5/5 throughout  Finger to nose normal  Cerebellar function normal  Ambulates without difficulty  B/L LE strength 5/5 throughout   Gross sensation to b/l upper and lower extremities intact      Psychiatric:         Mood and Affect: Mood and affect normal          Speech: Speech normal          Vital Signs  ED Triage Vitals [07/01/21 1240]   Temperature Pulse Respirations Blood Pressure SpO2   98 4 °F (36 9 °C) 100 16 118/78 96 %      Temp Source Heart Rate Source Patient Position - Orthostatic VS BP Location FiO2 (%)   Oral Monitor Sitting Right arm --      Pain Score       No Pain           Vitals:    07/01/21 1240 07/01/21 1557   BP: 118/78 133/85   Pulse: 100 87   Patient Position - Orthostatic VS: Sitting Sitting         Visual Acuity      ED Medications  Medications   sodium chloride 0 9 % bolus 1,000 mL (0 mL Intravenous Stopped 7/1/21 1557)   iohexol (OMNIPAQUE) 350 MG/ML injection (SINGLE-DOSE) 100 mL (100 mL Intravenous Given 7/1/21 1634)       Diagnostic Studies  Results Reviewed     Procedure Component Value Units Date/Time    Urine Microscopic [[id number]]  (Abnormal) Collected: 07/01/21 1558    Lab Status: Final result Specimen: Urine, Clean Catch Updated: 07/01/21 1707     RBC, UA None Seen /hpf      WBC, UA 1-2 /hpf      Epithelial Cells Occasional /hpf      Bacteria, UA Occasional /hpf     Urine Macroscopic, POC [[id number]]  (Abnormal) Collected: 07/01/21 1558    Lab Status: Final result Specimen: Urine Updated: 07/01/21 1559     Color, UA Yellow     Clarity, UA Clear     pH, UA 5 5     Leukocytes, UA Negative     Nitrite, UA Negative     Protein,  (2+) mg/dl      Glucose, UA Negative mg/dl      Ketones, UA Negative mg/dl      Urobilinogen, UA 0 2 E U /dl      Bilirubin, UA Negative     Blood, UA Negative     Specific Gravity, UA 1 025    Narrative:      CLINITEK RESULT    Lipase [[id number]]  (Normal) Collected: 07/01/21 1341    Lab Status: Final result Specimen: Blood from Arm, Right Updated: 07/01/21 1446     Lipase 234 u/L     NT-BNP PRO [[id number]]  (Normal) Collected: 07/01/21 1341    Lab Status: Final result Specimen: Blood from Arm, Right Updated: 07/01/21 1446     NT-proBNP 5 pg/mL     TSH [[id number]]  (Normal) Collected: 07/01/21 1341    Lab Status: Final result Specimen: Blood from Arm, Right Updated: 07/01/21 1446     TSH 3RD GENERATON 1 332 uIU/mL     Narrative:      Patients undergoing fluorescein dye angiography may retain small amounts of fluorescein in the body for 48-72 hours post procedure  Samples containing fluorescein can produce falsely depressed TSH values  If the patient had this procedure,a specimen should be resubmitted post fluorescein clearance        CK Total with Reflex CKMB [[id number]]  (Normal) Collected: 07/01/21 1341    Lab Status: Final result Specimen: Blood from Arm, Right Updated: 07/01/21 1415     Total CK 49 U/L     Troponin I [[id number]]  (Normal) Collected: 07/01/21 1341    Lab Status: Final result Specimen: Blood from Arm, Right Updated: 07/01/21 1408     Troponin I <0 02 ng/mL     Comprehensive metabolic panel [[id number]] Collected: 07/01/21 1341    Lab Status: Final result Specimen: Blood from Arm, Right Updated: 07/01/21 1406     Sodium 137 mmol/L      Potassium 4 5 mmol/L      Chloride 103 mmol/L      CO2 27 mmol/L      ANION GAP 7 mmol/L      BUN 19 mg/dL      Creatinine 1 30 mg/dL      Glucose 112 mg/dL      Calcium 9 8 mg/dL      AST 10 U/L      ALT 34 U/L      Alkaline Phosphatase 73 U/L      Total Protein 7 9 g/dL      Albumin 3 8 g/dL      Total Bilirubin 0 75 mg/dL      eGFR 74 ml/min/1 73sq m     Narrative:      [place] guidelines for Chronic Kidney Disease (CKD):     Stage 1 with normal or high GFR (GFR > 90 mL/min/1 73 square meters)    Stage 2 Mild CKD (GFR = 60-89 mL/min/1 73 square meters)    Stage 3A Moderate CKD (GFR = 45-59 mL/min/1 73 square meters)    Stage 3B Moderate CKD (GFR = 30-44 mL/min/1 73 square meters)    Stage 4 Severe CKD (GFR = 15-29 mL/min/1 73 square meters)    Stage 5 End Stage CKD (GFR <15 mL/min/1 73 square meters)  Note: GFR calculation is accurate only with a steady state creatinine    CBC and differential [[id number]]  (Abnormal) Collected: 07/01/21 1341    Lab Status: Final result Specimen: Blood from Arm, Right Updated: 07/01/21 1348     WBC 15 97 Thousand/uL      RBC 6 07 Million/uL      Hemoglobin 16 0 g/dL      Hematocrit 50 1 %      MCV 83 fL      MCH 26 4 pg      MCHC 31 9 g/dL      RDW 14 3 %      MPV 9 5 fL      Platelets 819 Thousands/uL      nRBC 0 /100 WBCs      Neutrophils Relative 67 %      Immat GRANS % 1 %      Lymphocytes Relative 19 %      Monocytes Relative 9 %      Eosinophils Relative 4 %      Basophils Relative 0 %      Neutrophils Absolute 10 71 Thousands/µL      Immature Grans Absolute 0 14 Thousand/uL      Lymphocytes Absolute 3 05 Thousands/µL      Monocytes Absolute 1 38 Thousand/µL      Eosinophils Absolute 0 63 Thousand/µL      Basophils Absolute 0 06 Thousands/µL                  CT chest abdomen pelvis w contrast   Final Result by Jake Roberto MD (07/01 5007)         1  Status post right radical nephrectomy with no evidence for residual/recurrent/metastatic disease involving the chest, abdomen, or pelvis  2   Nodular opacity noted on chest radiograph from earlier today in the right mid hemithorax demonstrated to be due to prominent vertebral osteophyte as described above  3   Additional findings as noted above  Workstation performed: [id number]         XR chest 1 view portable   Final Result by Jo-Ann Serna MD (07/01 7442)      No acute cardiopulmonary disease  2 cm nodular opacity adjacent to the right heart border, not visible on the previous study  This may be due to osteophyte formation exophytic from the spine or involving the right 8th costovertebral junction, but further evaluation with dual energy    subtraction chest radiographs is recommended to assure that this is a bone lesion and not a lung nodule  The study was marked in [facility] for immediate notification  Workstation performed: [id number]                    Procedures  ECG 12 Lead Documentation Only    Date/Time: 7/1/2021 1:31 PM  Performed by: Odalys Gerber PA-C  Authorized by: Odalys Gerber PA-C     Indications / Diagnosis:  Cp  ECG reviewed by me, the ED Provider: yes    Patient location:  ED  Previous ECG:     Previous ECG:  Unavailable    Comparison to cardiac monitor: Yes    Interpretation:     Interpretation: normal    Quality:     Tracing quality:  Limited by artifact  Rate:     ECG rate:  87    ECG rate assessment: normal    Rhythm:     Rhythm: sinus rhythm    Ectopy:     Ectopy: none    QRS:     QRS axis:  Normal    QRS intervals:  Normal  Conduction:     Conduction: normal    ST segments:     ST segments:  Normal  T waves:     T waves: normal    Comments:      QT/QTc: 352/423  No STEMI             ED Course  ED Course as of Jul 05 1334   Thu Jul 01, 2021   1410 Troponin I: <0 02   1722 1  Status post right radical nephrectomy with no evidence for residual/recurrent/metastatic disease involving the chest, abdomen, or pelvis  2   Nodular opacity noted on chest radiograph from earlier today in the right mid hemithorax demonstrated to be due to prominent vertebral osteophyte as described above  3   Additional findings as noted above  CT chest abdomen pelvis w contrast                             SBIRT 22yo+      Most Recent Value   SBIRT (22 yo +)   In order to provide better care to our patients, we are screening all of our patients for alcohol and drug use   Would it be okay to ask you these screening questions? Unable to answer at this time Filed at: 07/01/2021 1558                    [facility]  Number of Diagnoses or Management Options  Chest pain: new and requires workup  Epigastric pain: new and requires workup  Fatigue: new and requires workup  Weakness: new and requires workup  Diagnosis management comments: Patient is a 47 y/o male with hx of HTN, HLD, renal CA (s/p right nephrectomy), migraine headaches, eosinophilic esophagitis, presents to the ED for evaluation of generalized weakness, fatigue, chest pain, epigastric pain, lightheadedness  Pt states for the past 4 days he has been having gradually worsening symptoms and just "not feeling himself"  Pt denies sick contacts or recent travel  Pt has been taking tylenol without improvement in symptoms  Pt denies headaches, states he has been feeling lightheaded for past 4 months, did see Neurology, had MRI brain on 4/16 which showed No intracranial hemorrhage or mass  No evidence of sequelae intracranial trauma  Pt states he has been having intermittent episodes of left sided chest pain and "spasms" in epigastric area  WBC 15, hx of leukocytosis, otherwise lab work including troponin, TSH, CK, BNP WNL  EKG shows no STEMI  CXR shows No acute cardiopulmonary disease  2 cm nodular opacity adjacent to the right heart border, not visible on the previous study  This may be due to osteophyte formation exophytic from the spine or involving the right 8th costovertebral junction, but further evaluation with dual energy   subtraction chest radiographs is recommended to assure that this is a bone lesion and not a lung nodule  CT c/a/p shows 1  Status post right radical nephrectomy with no evidence for residual/recurrent/metastatic disease involving the chest, abdomen, or pelvis    2   Nodular opacity noted on chest radiograph from earlier today in the right mid hemithorax demonstrated to be due to prominent vertebral osteophyte as described above   3   Additional findings as noted above  Discussed all results with patient including incidental findings that require f/u by PCP  Patient verbalizes understanding and agrees with plan  The management plan was discussed in detail with the patient at bedside and all questions were answered  Prior to discharge, I provided both verbal and written instructions  I discussed with the patient the signs and symptoms for which to return to the emergency department  All questions were answered and patient was comfortable with the plan of care and discharged to home  The patient agrees to return to the Emergency Department for concerns and/or progression of illness  Disposition  Final diagnoses:   Fatigue   Chest pain   Epigastric pain   Weakness     Time reflects when diagnosis was documented in both MDM as applicable and the Disposition within this note     Time User Action Codes Description Comment    7/1/2021  5:29 PM Marina Lola Add [R53 83] Fatigue     7/1/2021  5:31 PM Marina Lola Add [R07 9] Chest pain     7/1/2021  5:31 PM Marina Lola Add [R10 13] Epigastric pain     7/1/2021  5:32 PM Marina Lola Add [R53 1] Weakness       ED Disposition     ED Disposition Condition Date/Time Comment    Discharge Stable Thu Jul 1, 2021  5:29 PM Anali Mario discharge to home/self care              Follow-up Information     Follow up With Specialties Details Why Contact Info    Roby Gazra MD Family Medicine Schedule an appointment as soon as possible for a visit in 1 week regarding incidental findings on CT and re-evaluation of symptoms Jasmeet Odonnell [address] [place] [id number]            Discharge Medication List as of 7/1/2021  5:32 PM      CONTINUE these medications which have NOT CHANGED    Details   ascorbic acid (VITAMIN C) 500 MG tablet Take 500 mg by mouth daily, Historical Med      aspirin 81 mg chewable tablet Chew, Historical Med      atorvastatin (LIPITOR) 40 mg tablet Take 40 mg by mouth daily, Historical Med      carvedilol (COREG) 25 mg tablet Take 1 tablet (25 mg total) by mouth 2 (two) times a day with meals, Starting Mon 4/12/2021, Until Tue 4/12/2022, Normal      cyclobenzaprine (FLEXERIL) 10 mg tablet Take 10 mg by mouth 3 (three) times a day as needed, Historical Med      levocetirizine (XYZAL) 5 MG tablet Take 5 mg by mouth every morning, Starting Wed 7/29/2020, Historical Med      linaCLOtide (Linzess) 290 MCG CAPS Take 290 mcg by mouth daily, Historical Med      LORazepam (ATIVAN) 0 5 mg tablet Starting Tue 8/25/2020, Historical Med      losartan-hydrochlorothiazide (HYZAAR) 100-12 5 MG per tablet Take 1 tablet by mouth daily Resume on 4/14, Starting Mon 4/12/2021, No Print      nortriptyline (PAMELOR) 25 mg capsule Take 25 mg by mouth daily, Starting Wed 8/5/2020, Until Thu 8/5/2021, Historical Med      pantoprazole (PROTONIX) 40 mg tablet Starting Fri 8/21/2020, Historical Med      spironolactone (ALDACTONE) 25 mg tablet Take 2 tablets (50 mg total) by mouth daily Resume on 4/14, Starting Mon 4/12/2021, No Print           No discharge procedures on file      PDMP Review     None          ED Provider  Electronically Signed by           Ayana Palomares PA-C  07/05/21 2925

## 2021-07-06 ENCOUNTER — HOSPITAL ENCOUNTER (EMERGENCY)
Facility: HOSPITAL | Age: 51
Discharge: HOME | End: 2021-07-07
Attending: EMERGENCY MEDICINE | Admitting: EMERGENCY MEDICINE
Payer: COMMERCIAL

## 2021-07-06 DIAGNOSIS — R07.9 CHEST PAIN: Primary | ICD-10-CM

## 2021-07-06 DIAGNOSIS — K22.4 ESOPHAGEAL SPASM: ICD-10-CM

## 2021-07-06 LAB
ALBUMIN SERPL BCP-MCNC: 3.9 G/DL (ref 3.5–5)
ALP SERPL-CCNC: 76 U/L (ref 46–116)
ALT SERPL W P-5'-P-CCNC: 25 U/L (ref 12–78)
ANION GAP SERPL CALCULATED.3IONS-SCNC: 9 MMOL/L (ref 4–13)
AST SERPL W P-5'-P-CCNC: 12 U/L (ref 5–45)
ATRIAL RATE: 97 BPM
BASOPHILS # BLD AUTO: 0.08 THOUSANDS/ΜL (ref 0–0.1)
BASOPHILS NFR BLD AUTO: 1 % (ref 0–1)
BILIRUB SERPL-MCNC: 0.42 MG/DL (ref 0.2–1)
BUN SERPL-MCNC: 23 MG/DL (ref 5–25)
CALCIUM SERPL-MCNC: 9.2 MG/DL (ref 8.3–10.1)
CHLORIDE SERPL-SCNC: 104 MMOL/L (ref 100–108)
CO2 SERPL-SCNC: 27 MMOL/L (ref 21–32)
CREAT SERPL-MCNC: 1.56 MG/DL (ref 0.6–1.3)
EOSINOPHIL # BLD AUTO: 0.55 THOUSAND/ΜL (ref 0–0.61)
EOSINOPHIL NFR BLD AUTO: 4 % (ref 0–6)
ERYTHROCYTE [DISTWIDTH] IN BLOOD BY AUTOMATED COUNT: 14.4 % (ref 11.6–15.1)
GFR SERPL CREATININE-BSD FRML MDRD: 59 ML/MIN/1.73SQ M
GLUCOSE SERPL-MCNC: 113 MG/DL (ref 65–140)
HCT VFR BLD AUTO: 47.5 % (ref 36.5–49.3)
HGB BLD-MCNC: 15.2 G/DL (ref 12–17)
IMM GRANULOCYTES # BLD AUTO: 0.12 THOUSAND/UL (ref 0–0.2)
IMM GRANULOCYTES NFR BLD AUTO: 1 % (ref 0–2)
LIPASE SERPL-CCNC: 265 U/L (ref 73–393)
LYMPHOCYTES # BLD AUTO: 3.04 THOUSANDS/ΜL (ref 0.6–4.47)
LYMPHOCYTES NFR BLD AUTO: 21 % (ref 14–44)
MCH RBC QN AUTO: 26.6 PG (ref 26.8–34.3)
MCHC RBC AUTO-ENTMCNC: 32 G/DL (ref 31.4–37.4)
MCV RBC AUTO: 83 FL (ref 82–98)
MONOCYTES # BLD AUTO: 1.47 THOUSAND/ΜL (ref 0.17–1.22)
MONOCYTES NFR BLD AUTO: 10 % (ref 4–12)
NEUTROPHILS # BLD AUTO: 9.47 THOUSANDS/ΜL (ref 1.85–7.62)
NEUTS SEG NFR BLD AUTO: 63 % (ref 43–75)
NRBC BLD AUTO-RTO: 0 /100 WBCS
P AXIS: 76 DEGREES
PLATELET # BLD AUTO: 297 THOUSANDS/UL (ref 149–390)
PMV BLD AUTO: 9.8 FL (ref 8.9–12.7)
POTASSIUM SERPL-SCNC: 4.1 MMOL/L (ref 3.5–5.3)
PR INTERVAL: 184 MS
PROT SERPL-MCNC: 8.1 G/DL (ref 6.4–8.2)
QRS AXIS: -11 DEGREES
QRSD INTERVAL: 88 MS
QT INTERVAL: 340 MS
QTC INTERVAL: 431 MS
RBC # BLD AUTO: 5.71 MILLION/UL (ref 3.88–5.62)
SODIUM SERPL-SCNC: 140 MMOL/L (ref 136–145)
T WAVE AXIS: 70 DEGREES
TROPONIN I SERPL-MCNC: <0.02 NG/ML
VENTRICULAR RATE: 97 BPM
WBC # BLD AUTO: 14.73 THOUSAND/UL (ref 4.31–10.16)

## 2021-07-06 PROCEDURE — 99285 EMERGENCY DEPT VISIT HI MDM: CPT | Performed by: EMERGENCY MEDICINE

## 2021-07-06 PROCEDURE — 96374 THER/PROPH/DIAG INJ IV PUSH: CPT

## 2021-07-06 PROCEDURE — 80053 COMPREHEN METABOLIC PANEL: CPT | Performed by: EMERGENCY MEDICINE

## 2021-07-06 PROCEDURE — 93010 ELECTROCARDIOGRAM REPORT: CPT | Performed by: INTERNAL MEDICINE

## 2021-07-06 PROCEDURE — 83690 ASSAY OF LIPASE: CPT | Performed by: EMERGENCY MEDICINE

## 2021-07-06 PROCEDURE — 85025 COMPLETE CBC W/AUTO DIFF WBC: CPT | Performed by: EMERGENCY MEDICINE

## 2021-07-06 PROCEDURE — 84484 ASSAY OF TROPONIN QUANT: CPT | Performed by: EMERGENCY MEDICINE

## 2021-07-06 PROCEDURE — 99285 EMERGENCY DEPT VISIT HI MDM: CPT

## 2021-07-06 PROCEDURE — 36415 COLL VENOUS BLD VENIPUNCTURE: CPT | Performed by: EMERGENCY MEDICINE

## 2021-07-06 PROCEDURE — 93005 ELECTROCARDIOGRAM TRACING: CPT

## 2021-07-06 RX ORDER — MAGNESIUM HYDROXIDE/ALUMINUM HYDROXICE/SIMETHICONE 120; 1200; 1200 MG/30ML; MG/30ML; MG/30ML
30 SUSPENSION ORAL ONCE
Status: COMPLETED | OUTPATIENT
Start: 2021-07-06 | End: 2021-07-06

## 2021-07-06 RX ORDER — LIDOCAINE HYDROCHLORIDE 20 MG/ML
15 SOLUTION OROPHARYNGEAL ONCE
Status: COMPLETED | OUTPATIENT
Start: 2021-07-06 | End: 2021-07-06

## 2021-07-06 RX ADMIN — ALUMINUM HYDROXIDE, MAGNESIUM HYDROXIDE, AND SIMETHICONE 30 ML: 200; 200; 20 SUSPENSION ORAL at 22:05

## 2021-07-06 RX ADMIN — LIDOCAINE HYDROCHLORIDE 15 ML: 20 SOLUTION ORAL; TOPICAL at 22:05

## 2021-07-06 RX ADMIN — FAMOTIDINE 20 MG: 10 INJECTION INTRAVENOUS at 22:05

## 2021-07-07 VITALS
SYSTOLIC BLOOD PRESSURE: 113 MMHG | BODY MASS INDEX: 43.34 KG/M2 | TEMPERATURE: 99.5 F | WEIGHT: 285.06 LBS | HEART RATE: 90 BPM | OXYGEN SATURATION: 98 % | RESPIRATION RATE: 18 BRPM | DIASTOLIC BLOOD PRESSURE: 71 MMHG

## 2021-07-07 LAB
ATRIAL RATE: 89 BPM
P AXIS: 63 DEGREES
PR INTERVAL: 182 MS
QRS AXIS: 12 DEGREES
QRSD INTERVAL: 90 MS
QT INTERVAL: 364 MS
QTC INTERVAL: 442 MS
T WAVE AXIS: 60 DEGREES
TROPONIN I SERPL-MCNC: <0.02 NG/ML
VENTRICULAR RATE: 89 BPM

## 2021-07-07 PROCEDURE — 96361 HYDRATE IV INFUSION ADD-ON: CPT

## 2021-07-07 PROCEDURE — 93005 ELECTROCARDIOGRAM TRACING: CPT

## 2021-07-07 PROCEDURE — 36415 COLL VENOUS BLD VENIPUNCTURE: CPT | Performed by: EMERGENCY MEDICINE

## 2021-07-07 PROCEDURE — 84484 ASSAY OF TROPONIN QUANT: CPT | Performed by: EMERGENCY MEDICINE

## 2021-07-07 PROCEDURE — 93010 ELECTROCARDIOGRAM REPORT: CPT | Performed by: INTERNAL MEDICINE

## 2021-07-07 RX ADMIN — SODIUM CHLORIDE 1000 ML: 0.9 INJECTION, SOLUTION INTRAVENOUS at 00:14

## 2021-07-07 NOTE — ED PROVIDER NOTES
History  Chief Complaint   Patient presents with    Chest Pain     Took nighttime doses of medications and ate some cereal  10 minutes later, he began to experience intermittent localized left chest pushing/throbbing pain  Comes and goes every approx 2 minutes lasting short duration  Patient presents for chest pressure that is been intermittent over the past 2 hours  Was laying down in bed eating  States he had 2 bowls of cereal and the pain started after that  States it is centrally located in his chest, substernal without radiating components  No other symptoms associated with this  History provided by:  Patient   used: No    Chest Pain  Pain location:  Substernal area  Pain quality: pressure    Pain radiates to:  Does not radiate  Pain radiates to the back: no    Pain severity:  Moderate  Onset quality:  Gradual  Duration:  2 hours  Timing:  Intermittent  Progression:  Waxing and waning  Chronicity:  New  Context: at rest    Relieved by:  Nothing  Associated symptoms: no abdominal pain, no back pain, no cough, no dizziness, no fever, no nausea, no palpitations, no shortness of breath and not vomiting    Risk factors: high cholesterol, hypertension, male sex, obesity and smoking    Risk factors: no coronary artery disease        Prior to Admission Medications   Prescriptions Last Dose Informant Patient Reported? Taking?    LORazepam (ATIVAN) 0 5 mg tablet   Yes No   ascorbic acid (VITAMIN C) 500 MG tablet   Yes No   Sig: Take 500 mg by mouth daily   aspirin 81 mg chewable tablet   Yes No   Sig: Chew   atorvastatin (LIPITOR) 40 mg tablet   Yes No   Sig: Take 40 mg by mouth daily   carvedilol (COREG) 25 mg tablet   No No   Sig: Take 1 tablet (25 mg total) by mouth 2 (two) times a day with meals   cyclobenzaprine (FLEXERIL) 10 mg tablet   Yes No   Sig: Take 10 mg by mouth 3 (three) times a day as needed   levocetirizine (XYZAL) 5 MG tablet   Yes No   Sig: Take 5 mg by mouth every morning   linaCLOtide (Linzess) 290 MCG CAPS   Yes No   Sig: Take 290 mcg by mouth daily   losartan-hydrochlorothiazide (HYZAAR) 100-12 5 MG per tablet   No No   Sig: Take 1 tablet by mouth daily Resume on 4/14   nortriptyline (PAMELOR) 25 mg capsule   Yes No   Sig: Take 25 mg by mouth daily   pantoprazole (PROTONIX) 20 mg tablet   No No   Sig: Take 1 tablet (20 mg total) by mouth daily   pantoprazole (PROTONIX) 40 mg tablet   Yes No   spironolactone (ALDACTONE) 25 mg tablet   No No   Sig: Take 2 tablets (50 mg total) by mouth daily Resume on 4/14      Facility-Administered Medications: None       Past Medical History:   Diagnosis Date    Eosinophilic esophagitis     History of kidney cancer     2007    Hyperlipidemia     Hypertension     Renal disorder        Past Surgical History:   Procedure Laterality Date    COLONOSCOPY  08/04/2015    Normal by Dr Lolis Gabriel at [address] [place] [address]  12/16/2016    Diverticulosis otherwise normal by Dr Lolis Gabriel, [facility] COLONOSCOPY  05/14/2021    normal by Dr Saeid Desai Right     Renal cell carcinoma    UPPER GASTROINTESTINAL ENDOSCOPY  08/2020    Biopsy positive for eosinophilic esophagitis       Family History   Problem Relation Age of Onset    Lymphoma Mother      I have reviewed and agree with the history as documented  E-Cigarette/Vaping    E-Cigarette Use Never User      E-Cigarette/Vaping Substances     Social History     Tobacco Use    Smoking status: Current Every Day Smoker     Packs/day: 0 25     Types: Cigarettes    Smokeless tobacco: Never Used   Vaping Use    Vaping Use: Never used   Substance Use Topics    Alcohol use: Never    Drug use: Not Currently       Review of Systems   Constitutional: Negative for chills and fever  Eyes: Negative for visual disturbance  Respiratory: Negative for cough, chest tightness and shortness of breath  Cardiovascular: Positive for chest pain   Negative for palpitations and leg swelling  Gastrointestinal: Negative for abdominal pain, nausea and vomiting  Musculoskeletal: Negative for back pain and neck pain  Skin: Negative for color change, pallor, rash and wound  Allergic/Immunologic: Negative for immunocompromised state  Neurological: Negative for dizziness, syncope and light-headedness  All other systems reviewed and are negative  Physical Exam  Physical Exam  Vitals and nursing note reviewed  Constitutional:       General: He is not in acute distress  Appearance: He is well-developed  He is not ill-appearing, toxic-appearing or diaphoretic  HENT:      Head: Normocephalic and atraumatic  Right Ear: External ear normal       Left Ear: External ear normal       Nose: No congestion  Eyes:      General: No scleral icterus  Conjunctiva/sclera: Conjunctivae normal       Right eye: Right conjunctiva is not injected  Left eye: Left conjunctiva is not injected  Pupils: Pupils are equal, round, and reactive to light  Neck:      Trachea: No tracheal deviation  Cardiovascular:      Rate and Rhythm: Normal rate and regular rhythm  Pulses: Normal pulses  Heart sounds: Normal heart sounds  No murmur heard  No friction rub  Pulmonary:      Effort: Pulmonary effort is normal  No respiratory distress  Breath sounds: Normal breath sounds  No stridor  No wheezing or rales  Abdominal:      General: There is no distension  Palpations: Abdomen is soft  Tenderness: There is no abdominal tenderness  There is no guarding or rebound  Musculoskeletal:         General: No tenderness or deformity  Normal range of motion  Cervical back: Normal range of motion and neck supple  Right lower leg: No tenderness  No edema  Left lower leg: No tenderness  No edema  Skin:     General: Skin is warm and dry  Capillary Refill: Capillary refill takes less than 2 seconds  Coloration: Skin is not pale        Findings: No erythema or rash  Neurological:      Mental Status: He is alert and oriented to person, place, and time  Motor: No abnormal muscle tone     Psychiatric:         Mood and Affect: Mood normal          Behavior: Behavior normal          Vital Signs  ED Triage Vitals   Temperature Pulse Respirations Blood Pressure SpO2   07/06/21 2140 07/06/21 2139 07/06/21 2139 07/06/21 2139 07/06/21 2139   99 5 °F (37 5 °C) 98 18 158/98 95 %      Temp Source Heart Rate Source Patient Position - Orthostatic VS BP Location FiO2 (%)   07/06/21 2140 07/06/21 2139 07/06/21 2139 07/06/21 2139 --   Oral Monitor Lying Right arm       Pain Score       07/06/21 2139       2           Vitals:    07/06/21 2139 07/07/21 0016   BP: 158/98 113/71   Pulse: 98 90   Patient Position - Orthostatic VS: Lying          Visual Acuity      ED Medications  Medications   Lidocaine Viscous HCl (XYLOCAINE) 2 % mucosal solution 15 mL (15 mL Swish & Spit Given 7/6/21 2205)   famotidine (PEPCID) injection 20 mg (20 mg Intravenous Given 7/6/21 2205)   aluminum-magnesium hydroxide-simethicone (MYLANTA) oral suspension 30 mL (30 mL Oral Given 7/6/21 2205)   sodium chloride 0 9 % bolus 1,000 mL (1,000 mL Intravenous New Bag 7/7/21 0014)       Diagnostic Studies  Results Reviewed     Procedure Component Value Units Date/Time    Troponin I repeat in 3hrs [[id number]]  (Normal) Collected: 07/07/21 0056    Lab Status: Final result Specimen: Blood from Arm, Left Updated: 07/07/21 0117     Troponin I <0 02 ng/mL     Troponin I [[id number]]  (Normal) Collected: 07/06/21 2202    Lab Status: Final result Specimen: Blood from Arm, Left Updated: 07/06/21 2224     Troponin I <0 02 ng/mL     Comprehensive metabolic panel [[id number]]  (Abnormal) Collected: 07/06/21 2202    Lab Status: Final result Specimen: Blood from Arm, Left Updated: 07/06/21 2222     Sodium 140 mmol/L      Potassium 4 1 mmol/L      Chloride 104 mmol/L      CO2 27 mmol/L      ANION GAP 9 mmol/L      BUN 23 mg/dL      Creatinine 1 56 mg/dL      Glucose 113 mg/dL      Calcium 9 2 mg/dL      AST 12 U/L      ALT 25 U/L      Alkaline Phosphatase 76 U/L      Total Protein 8 1 g/dL      Albumin 3 9 g/dL      Total Bilirubin 0 42 mg/dL      eGFR 59 ml/min/1 73sq m     Narrative:      [place] guidelines for Chronic Kidney Disease (CKD):     Stage 1 with normal or high GFR (GFR > 90 mL/min/1 73 square meters)    Stage 2 Mild CKD (GFR = 60-89 mL/min/1 73 square meters)    Stage 3A Moderate CKD (GFR = 45-59 mL/min/1 73 square meters)    Stage 3B Moderate CKD (GFR = 30-44 mL/min/1 73 square meters)    Stage 4 Severe CKD (GFR = 15-29 mL/min/1 73 square meters)    Stage 5 End Stage CKD (GFR <15 mL/min/1 73 square meters)  Note: GFR calculation is accurate only with a steady state creatinine    Lipase [[id number]]  (Normal) Collected: 07/06/21 2202    Lab Status: Final result Specimen: Blood from Arm, Left Updated: 07/06/21 2222     Lipase 265 u/L     CBC and differential [[id number]]  (Abnormal) Collected: 07/06/21 2202    Lab Status: Final result Specimen: Blood from Arm, Left Updated: 07/06/21 2207     WBC 14 73 Thousand/uL      RBC 5 71 Million/uL      Hemoglobin 15 2 g/dL      Hematocrit 47 5 %      MCV 83 fL      MCH 26 6 pg      MCHC 32 0 g/dL      RDW 14 4 %      MPV 9 8 fL      Platelets 109 Thousands/uL      nRBC 0 /100 WBCs      Neutrophils Relative 63 %      Immat GRANS % 1 %      Lymphocytes Relative 21 %      Monocytes Relative 10 %      Eosinophils Relative 4 %      Basophils Relative 1 %      Neutrophils Absolute 9 47 Thousands/µL      Immature Grans Absolute 0 12 Thousand/uL      Lymphocytes Absolute 3 04 Thousands/µL      Monocytes Absolute 1 47 Thousand/µL      Eosinophils Absolute 0 55 Thousand/µL      Basophils Absolute 0 08 Thousands/µL                  No orders to display              Procedures  ECG 12 Lead Documentation Only    Date/Time: 7/6/2021 9:42 PM  Performed by: Phil Vazquez Isaak Juárez DO  Authorized by: Funmi Santiago DO     Indications / Diagnosis:  Chest pain  ECG reviewed by me, the ED Provider: yes    Patient location:  ED  Previous ECG:     Previous ECG:  Compared to current    Similarity:  No change  Interpretation:     Interpretation: non-specific    Rate:     ECG rate:  97    ECG rate assessment: normal    Rhythm:     Rhythm: sinus rhythm    Ectopy:     Ectopy: none    QRS:     QRS axis:  Normal    QRS intervals:  Normal  Conduction:     Conduction: normal    ST segments:     ST segments:  Non-specific  T waves:     T waves: normal    Q waves:     Q waves:  V1 and V2    ECG 12 Lead Documentation Only    Date/Time: 7/7/2021 12:57 AM  Performed by: Funmi Santiago DO  Authorized by: Funmi Santiago DO     Indications / Diagnosis:  Repeat for chest pain  ECG reviewed by me, the ED Provider: yes    Patient location:  ED  Previous ECG:     Previous ECG:  Compared to current    Similarity:  No change  Interpretation:     Interpretation: normal    Rate:     ECG rate:  89    ECG rate assessment: normal    Rhythm:     Rhythm: sinus rhythm    Ectopy:     Ectopy: none    QRS:     QRS axis:  Normal    QRS intervals:  Normal  Conduction:     Conduction: normal    ST segments:     ST segments:  Normal  T waves:     T waves: normal               ED Course             HEART Risk Score      Most Recent Value   Heart Score Risk Calculator   History  0 Filed at: 07/07/2021 0124   ECG  0 Filed at: 07/07/2021 0124   Age  1 Filed at: 07/07/2021 0124   Risk Factors  2 Filed at: 07/07/2021 0124   Troponin  0 Filed at: 07/07/2021 0124   HEART Score  3 Filed at: 07/07/2021 0124                                    MDM  Number of Diagnoses or Management Options  Chest pain: new and requires workup  Esophageal spasm: new and requires workup  Diagnosis management comments: Patient presents with intermittent bouts of chest pressure over the past 2 hours    Patient was seen here about 5 days ago and did have complaints of chest pain but he states that this is completely different  This started at rest and after eating  No other symptoms associated with this  Patient overall appears well on physical exam with normal vital signs and a nonischemic EKG  He does have cardiac risk factors so will obtain a cardiac workup with a delta troponin and EKG  Patient just had a CT of his chest that was overall negative for acute pathology  I do not feel that repeating imaging is needed at this time  I feel that the patient is probably having esophageal spasms or dysmotility  Does have a history of eosinophilic esophagitis  Patient is not presenting like a food bolus  Will treat with Maalox, Pepcid and viscous lidocaine, observe, check labs and continue treatment as needed  1:22 AM  Repeat troponin and EKG are normal   Labs do show a mild BEN compared to previous  IV fluids given for hydration  Patient has no symptoms currently  He states that about 45 minutes after I gave him his initial medications the symptoms resolved and have not returned  I explained to them that this could be spasms from his eosinophilic esophagitis that he has had in the past   He might need an increase in his Protonix  I explained to continue his medications currently and to follow-up with gastroenterology if these are persisting  Patient was on 40 mg of Protonix daily and is now on 20 mg daily that was switched about a month ago so this is a possibility  Will have him return if any other symptoms worsen and have him follow-up with his PCP if symptoms are persisting to evaluate other possible causes  Patient has an appointment with neurology today that I encouraged to follow-up with  Patient agrees with this plan of care  Questions and concerns answered  Will discharge home         Amount and/or Complexity of Data Reviewed  Clinical lab tests: reviewed and ordered  Tests in the medicine section of CPT®: ordered and reviewed  Review and summarize past medical records: yes        Disposition  Final diagnoses:   Chest pain   Esophageal spasm     Time reflects when diagnosis was documented in both MDM as applicable and the Disposition within this note     Time User Action Codes Description Comment    7/7/2021  1:21 AM Ana Castellanos Add [R07 9] Chest pain     7/7/2021  1:21 AM Ana Castellanos Add [K22 4] Esophageal spasm       ED Disposition     ED Disposition Condition Date/Time Comment    Discharge Stable Wed Jul 7, 2021  1:20 AM Alexandria Pradoанна discharge to home/self care  Follow-up Information     Follow up With Specialties Details Why Contact Info Additional Information    Sharla Miguel MD Family Medicine Call  If symptoms persist, To schedule an appointment as soon as you can [address] [facility] [phone]  Emergency Department Emergency Medicine Go to  If symptoms worsen [place] [phone]  [address] [facility] Emergency Department, [address] [place] [address], [phone]          Patient's Medications   Discharge Prescriptions    No medications on file     No discharge procedures on file      PDMP Review     None          ED Provider  Electronically Signed by           Emil Weinberg DO  07/07/21 0124

## 2021-08-16 ENCOUNTER — HOSPITAL ENCOUNTER (EMERGENCY)
Facility: HOSPITAL | Age: 51
Discharge: HOME | End: 2021-08-16
Attending: EMERGENCY MEDICINE
Payer: COMMERCIAL

## 2021-08-16 ENCOUNTER — APPOINTMENT (EMERGENCY)
Dept: CT IMAGING | Facility: HOSPITAL | Age: 51
End: 2021-08-16
Payer: COMMERCIAL

## 2021-08-16 VITALS
OXYGEN SATURATION: 98 % | DIASTOLIC BLOOD PRESSURE: 76 MMHG | SYSTOLIC BLOOD PRESSURE: 110 MMHG | HEART RATE: 73 BPM | RESPIRATION RATE: 18 BRPM | TEMPERATURE: 98.4 F

## 2021-08-16 DIAGNOSIS — R10.9 ABDOMINAL PAIN: Primary | ICD-10-CM

## 2021-08-16 LAB
ALBUMIN SERPL BCP-MCNC: 3.8 G/DL (ref 3.5–5)
ALP SERPL-CCNC: 64 U/L (ref 46–116)
ALT SERPL W P-5'-P-CCNC: 33 U/L (ref 12–78)
ANION GAP SERPL CALCULATED.3IONS-SCNC: 7 MMOL/L (ref 4–13)
AST SERPL W P-5'-P-CCNC: 15 U/L (ref 5–45)
ATRIAL RATE: 72 BPM
BACTERIA UR QL AUTO: ABNORMAL /HPF
BASOPHILS # BLD AUTO: 0.04 THOUSANDS/ΜL (ref 0–0.1)
BASOPHILS NFR BLD AUTO: 0 % (ref 0–1)
BILIRUB DIRECT SERPL-MCNC: 0.09 MG/DL (ref 0–0.2)
BILIRUB SERPL-MCNC: 0.41 MG/DL (ref 0.2–1)
BILIRUB UR QL STRIP: NEGATIVE
BUN SERPL-MCNC: 22 MG/DL (ref 5–25)
CALCIUM SERPL-MCNC: 9 MG/DL (ref 8.3–10.1)
CHLORIDE SERPL-SCNC: 102 MMOL/L (ref 100–108)
CLARITY UR: CLEAR
CO2 SERPL-SCNC: 29 MMOL/L (ref 21–32)
COLOR UR: YELLOW
CREAT SERPL-MCNC: 1.4 MG/DL (ref 0.6–1.3)
EOSINOPHIL # BLD AUTO: 0.55 THOUSAND/ΜL (ref 0–0.61)
EOSINOPHIL NFR BLD AUTO: 4 % (ref 0–6)
ERYTHROCYTE [DISTWIDTH] IN BLOOD BY AUTOMATED COUNT: 14 % (ref 11.6–15.1)
GFR SERPL CREATININE-BSD FRML MDRD: 67 ML/MIN/1.73SQ M
GLUCOSE SERPL-MCNC: 128 MG/DL (ref 65–140)
GLUCOSE UR STRIP-MCNC: NEGATIVE MG/DL
HCT VFR BLD AUTO: 47.6 % (ref 36.5–49.3)
HGB BLD-MCNC: 15.2 G/DL (ref 12–17)
HGB UR QL STRIP.AUTO: NEGATIVE
IMM GRANULOCYTES # BLD AUTO: 0.06 THOUSAND/UL (ref 0–0.2)
IMM GRANULOCYTES NFR BLD AUTO: 1 % (ref 0–2)
KETONES UR STRIP-MCNC: NEGATIVE MG/DL
LEUKOCYTE ESTERASE UR QL STRIP: NEGATIVE
LIPASE SERPL-CCNC: 212 U/L (ref 73–393)
LYMPHOCYTES # BLD AUTO: 2.41 THOUSANDS/ΜL (ref 0.6–4.47)
LYMPHOCYTES NFR BLD AUTO: 19 % (ref 14–44)
MCH RBC QN AUTO: 27 PG (ref 26.8–34.3)
MCHC RBC AUTO-ENTMCNC: 31.9 G/DL (ref 31.4–37.4)
MCV RBC AUTO: 85 FL (ref 82–98)
MONOCYTES # BLD AUTO: 1.12 THOUSAND/ΜL (ref 0.17–1.22)
MONOCYTES NFR BLD AUTO: 9 % (ref 4–12)
NEUTROPHILS # BLD AUTO: 8.67 THOUSANDS/ΜL (ref 1.85–7.62)
NEUTS SEG NFR BLD AUTO: 67 % (ref 43–75)
NITRITE UR QL STRIP: NEGATIVE
NON-SQ EPI CELLS URNS QL MICRO: ABNORMAL /HPF
NRBC BLD AUTO-RTO: 0 /100 WBCS
P AXIS: 49 DEGREES
PH UR STRIP.AUTO: 5.5 [PH] (ref 4.5–8)
PLATELET # BLD AUTO: 275 THOUSANDS/UL (ref 149–390)
PMV BLD AUTO: 10.1 FL (ref 8.9–12.7)
POTASSIUM SERPL-SCNC: 4.2 MMOL/L (ref 3.5–5.3)
PR INTERVAL: 198 MS
PROT SERPL-MCNC: 7.7 G/DL (ref 6.4–8.2)
PROT UR STRIP-MCNC: ABNORMAL MG/DL
QRS AXIS: 14 DEGREES
QRSD INTERVAL: 90 MS
QT INTERVAL: 376 MS
QTC INTERVAL: 411 MS
RBC # BLD AUTO: 5.62 MILLION/UL (ref 3.88–5.62)
RBC #/AREA URNS AUTO: ABNORMAL /HPF
SODIUM SERPL-SCNC: 138 MMOL/L (ref 136–145)
SP GR UR STRIP.AUTO: >=1.03 (ref 1–1.03)
T WAVE AXIS: 16 DEGREES
TROPONIN I SERPL-MCNC: <0.02 NG/ML
UROBILINOGEN UR QL STRIP.AUTO: 0.2 E.U./DL
VENTRICULAR RATE: 72 BPM
WBC # BLD AUTO: 12.85 THOUSAND/UL (ref 4.31–10.16)
WBC #/AREA URNS AUTO: ABNORMAL /HPF

## 2021-08-16 PROCEDURE — 83690 ASSAY OF LIPASE: CPT | Performed by: EMERGENCY MEDICINE

## 2021-08-16 PROCEDURE — 81001 URINALYSIS AUTO W/SCOPE: CPT

## 2021-08-16 PROCEDURE — 96360 HYDRATION IV INFUSION INIT: CPT

## 2021-08-16 PROCEDURE — G1004 CDSM NDSC: HCPCS

## 2021-08-16 PROCEDURE — 85025 COMPLETE CBC W/AUTO DIFF WBC: CPT | Performed by: EMERGENCY MEDICINE

## 2021-08-16 PROCEDURE — 93005 ELECTROCARDIOGRAM TRACING: CPT

## 2021-08-16 PROCEDURE — 36415 COLL VENOUS BLD VENIPUNCTURE: CPT | Performed by: EMERGENCY MEDICINE

## 2021-08-16 PROCEDURE — 93010 ELECTROCARDIOGRAM REPORT: CPT | Performed by: INTERNAL MEDICINE

## 2021-08-16 PROCEDURE — 80076 HEPATIC FUNCTION PANEL: CPT | Performed by: EMERGENCY MEDICINE

## 2021-08-16 PROCEDURE — 80048 BASIC METABOLIC PNL TOTAL CA: CPT | Performed by: EMERGENCY MEDICINE

## 2021-08-16 PROCEDURE — 99285 EMERGENCY DEPT VISIT HI MDM: CPT | Performed by: EMERGENCY MEDICINE

## 2021-08-16 PROCEDURE — 99284 EMERGENCY DEPT VISIT MOD MDM: CPT

## 2021-08-16 PROCEDURE — 84484 ASSAY OF TROPONIN QUANT: CPT | Performed by: EMERGENCY MEDICINE

## 2021-08-16 PROCEDURE — 74176 CT ABD & PELVIS W/O CONTRAST: CPT

## 2021-08-16 RX ORDER — HYOSCYAMINE SULFATE 0.125 MG
0.12 TABLET ORAL EVERY 4 HOURS PRN
Qty: 30 TABLET | Refills: 0 | Status: SHIPPED | OUTPATIENT
Start: 2021-08-16

## 2021-08-16 RX ADMIN — HYOSCYAMINE SULFATE 0.25 MG: 0.12 TABLET SUBLINGUAL at 09:29

## 2021-08-16 RX ADMIN — SODIUM CHLORIDE 500 ML: 0.9 INJECTION, SOLUTION INTRAVENOUS at 09:30

## 2021-08-16 NOTE — ED PROVIDER NOTES
History  Chief Complaint   Patient presents with    Flank Pain     L flank pain, LLQ abdominal pain for 3 days  No urinary symotoms  47 YO male presents with Left flank and abdominal pain, onset ~3 days prior  Pt states pain was originally in the Left flank, radiating down, intermittent, burning in quality, no known exacerbating or alleviating factors  States that, over the last few days, he has had movement of the pain to the abdomen, particularly in the LUQ, this has also been intermittent and burning in quality  Pt currently has this pain, he has not taken anything for it  Pt denies similar pain in the past, he has no associated nausea or vomiting, states recent constipation but his bowel habits have been regular for the last 2 days  Pt has a Hx of a Right nephrectomy 14 years ago for renal cell carcinoma, he has known cysts on the Left kidney which have been stable  He had a colonoscopy 3 months prior for evaluation of constipation, this did not show any abnormalities  Pt denies CP/SOB/F/C/N/V/D/C, no dysuria, burning on urination or blood in urine  History provided by:  Patient   used: No    Flank Pain  Pain location:  L flank  Pain quality: burning    Pain radiates to:  LUQ  Pain severity:  Moderate  Onset quality:  Gradual  Duration:  3 days  Timing:  Intermittent  Progression:  Waxing and waning  Chronicity:  New  Relieved by:  Nothing  Worsened by:  Nothing  Ineffective treatments:  None tried  Associated symptoms: no chest pain, no cough, no dysuria, no fever, no nausea, no shortness of breath and no vomiting        Prior to Admission Medications   Prescriptions Last Dose Informant Patient Reported? Taking?    LORazepam (ATIVAN) 0 5 mg tablet   Yes No   ascorbic acid (VITAMIN C) 500 MG tablet   Yes No   Sig: Take 500 mg by mouth daily   aspirin 81 mg chewable tablet   Yes No   Sig: Chew   atorvastatin (LIPITOR) 40 mg tablet   Yes No   Sig: Take 40 mg by mouth daily carvedilol (COREG) 25 mg tablet   No No   Sig: Take 1 tablet (25 mg total) by mouth 2 (two) times a day with meals   cyclobenzaprine (FLEXERIL) 10 mg tablet   Yes No   Sig: Take 10 mg by mouth 3 (three) times a day as needed   levocetirizine (XYZAL) 5 MG tablet   Yes No   Sig: Take 5 mg by mouth every morning   linaCLOtide (Linzess) 290 MCG CAPS   Yes No   Sig: Take 290 mcg by mouth daily   losartan-hydrochlorothiazide (HYZAAR) 100-12 5 MG per tablet   No No   Sig: Take 1 tablet by mouth daily Resume on 4/14   nortriptyline (PAMELOR) 25 mg capsule   Yes No   Sig: Take 25 mg by mouth daily   pantoprazole (PROTONIX) 20 mg tablet   No No   Sig: Take 1 tablet (20 mg total) by mouth daily   pantoprazole (PROTONIX) 40 mg tablet   Yes No   spironolactone (ALDACTONE) 25 mg tablet   No No   Sig: Take 2 tablets (50 mg total) by mouth daily Resume on 4/14      Facility-Administered Medications: None       Past Medical History:   Diagnosis Date    Eosinophilic esophagitis     History of kidney cancer     2007    Hyperlipidemia     Hypertension     Renal disorder        Past Surgical History:   Procedure Laterality Date    COLONOSCOPY  08/04/2015    Normal by Dr Mauri Davila at [address] [place] [address]  12/16/2016    Diverticulosis otherwise normal by Donny Malone    COLONOSCOPY  05/14/2021    normal by Dr Oniel Mason Right     Renal cell carcinoma    UPPER GASTROINTESTINAL ENDOSCOPY  08/2020    Biopsy positive for eosinophilic esophagitis       Family History   Problem Relation Age of Onset    Lymphoma Mother      I have reviewed and agree with the history as documented      E-Cigarette/Vaping    E-Cigarette Use Never User      E-Cigarette/Vaping Substances     Social History     Tobacco Use    Smoking status: Current Every Day Smoker     Packs/day: 0 25     Types: Cigarettes    Smokeless tobacco: Never Used   Vaping Use    Vaping Use: Never used   Substance Use Topics    Alcohol use: Never    Drug use: Not Currently       Review of Systems   Constitutional: Negative for fever  HENT: Negative for dental problem  Eyes: Negative for visual disturbance  Respiratory: Negative for cough and shortness of breath  Cardiovascular: Negative for chest pain  Gastrointestinal: Negative for abdominal pain, nausea and vomiting  Genitourinary: Positive for flank pain  Negative for dysuria and frequency  Musculoskeletal: Negative for neck pain and neck stiffness  Skin: Negative for rash  Neurological: Negative for dizziness, weakness and light-headedness  Psychiatric/Behavioral: Negative for agitation, behavioral problems and confusion  All other systems reviewed and are negative  Physical Exam  Physical Exam  Vitals and nursing note reviewed  Constitutional:       Appearance: He is well-developed  HENT:      Head: Normocephalic and atraumatic  Eyes:      Extraocular Movements: Extraocular movements intact  Cardiovascular:      Rate and Rhythm: Normal rate  Pulmonary:      Effort: Pulmonary effort is normal    Abdominal:      General: There is no distension  Tenderness: There is no abdominal tenderness  There is no left CVA tenderness  Musculoskeletal:         General: Normal range of motion  Cervical back: Normal range of motion  Skin:     Findings: No rash  Neurological:      Mental Status: He is alert and oriented to person, place, and time     Psychiatric:         Behavior: Behavior normal          Vital Signs  ED Triage Vitals   Temperature Pulse Respirations Blood Pressure SpO2   08/16/21 0853 08/16/21 0853 08/16/21 0853 08/16/21 0853 08/16/21 0853   98 4 °F (36 9 °C) 83 16 128/82 98 %      Temp Source Heart Rate Source Patient Position - Orthostatic VS BP Location FiO2 (%)   08/16/21 0853 08/16/21 1110 08/16/21 0853 08/16/21 0853 --   Oral Monitor Sitting Right arm       Pain Score       --                  Vitals:    08/16/21 1015 08/16/21 1045 08/16/21 1100 08/16/21 1110   BP:    110/76   Pulse: 72 70 70 73   Patient Position - Orthostatic VS:    Sitting         Visual Acuity      ED Medications  Medications   sodium chloride 0 9 % bolus 500 mL (0 mL Intravenous Stopped 8/16/21 1030)   hyoscyamine (LEVSIN/SL) SL tablet 0 25 mg (0 25 mg Sublingual Given 8/16/21 0929)       Diagnostic Studies  Results Reviewed     Procedure Component Value Units Date/Time    Urine Microscopic [[id number]]  (Abnormal) Collected: 08/16/21 1113    Lab Status: Final result Specimen: Urine, Clean Catch Updated: 08/16/21 1207     RBC, UA 0-1 /hpf      WBC, UA 2-4 /hpf      Epithelial Cells Occasional /hpf      Bacteria, UA None Seen /hpf     Urine Macroscopic, POC [[id number]]  (Abnormal) Collected: 08/16/21 1113    Lab Status: Final result Specimen: Urine Updated: 08/16/21 1115     Color, UA Yellow     Clarity, UA Clear     pH, UA 5 5     Leukocytes, UA Negative     Nitrite, UA Negative     Protein,  (2+) mg/dl      Glucose, UA Negative mg/dl      Ketones, UA Negative mg/dl      Urobilinogen, UA 0 2 E U /dl      Bilirubin, UA Negative     Blood, UA Negative     Specific Gravity, UA >=1 030    Narrative:      CLINITEK RESULT    Basic metabolic panel [[id number]]  (Abnormal) Collected: 08/16/21 0934    Lab Status: Final result Specimen: Blood from Arm, Right Updated: 08/16/21 1056     Sodium 138 mmol/L      Potassium 4 2 mmol/L      Chloride 102 mmol/L      CO2 29 mmol/L      ANION GAP 7 mmol/L      BUN 22 mg/dL      Creatinine 1 40 mg/dL      Glucose 128 mg/dL      Calcium 9 0 mg/dL      eGFR 67 ml/min/1 73sq m     Narrative:      [facility] guidelines for Chronic Kidney Disease (CKD):     Stage 1 with normal or high GFR (GFR > 90 mL/min/1 73 square meters)    Stage 2 Mild CKD (GFR = 60-89 mL/min/1 73 square meters)    Stage 3A Moderate CKD (GFR = 45-59 mL/min/1 73 square meters)    Stage 3B Moderate CKD (GFR = 30-44 mL/min/1 73 square meters)    Stage 4 Severe CKD (GFR = 15-29 mL/min/1 73 square meters)    Stage 5 End Stage CKD (GFR <15 mL/min/1 73 square meters)  Note: GFR calculation is accurate only with a steady state creatinine    Hepatic function panel [[id number]]  (Normal) Collected: 08/16/21 0934    Lab Status: Final result Specimen: Blood from Arm, Right Updated: 08/16/21 1041     Total Bilirubin 0 41 mg/dL      Bilirubin, Direct 0 09 mg/dL      Alkaline Phosphatase 64 U/L      AST 15 U/L      ALT 33 U/L      Total Protein 7 7 g/dL      Albumin 3 8 g/dL     Lipase [[id number]]  (Normal) Collected: 08/16/21 0934    Lab Status: Final result Specimen: Blood from Arm, Right Updated: 08/16/21 1041     Lipase 212 u/L     Troponin I [[id number]]  (Normal) Collected: 08/16/21 0934    Lab Status: Final result Specimen: Blood from Arm, Right Updated: 08/16/21 0956     Troponin I <0 02 ng/mL     CBC and differential [[id number]]  (Abnormal) Collected: 08/16/21 0934    Lab Status: Final result Specimen: Blood from Arm, Right Updated: 08/16/21 0939     WBC 12 85 Thousand/uL      RBC 5 62 Million/uL      Hemoglobin 15 2 g/dL      Hematocrit 47 6 %      MCV 85 fL      MCH 27 0 pg      MCHC 31 9 g/dL      RDW 14 0 %      MPV 10 1 fL      Platelets 744 Thousands/uL      nRBC 0 /100 WBCs      Neutrophils Relative 67 %      Immat GRANS % 1 %      Lymphocytes Relative 19 %      Monocytes Relative 9 %      Eosinophils Relative 4 %      Basophils Relative 0 %      Neutrophils Absolute 8 67 Thousands/µL      Immature Grans Absolute 0 06 Thousand/uL      Lymphocytes Absolute 2 41 Thousands/µL      Monocytes Absolute 1 12 Thousand/µL      Eosinophils Absolute 0 55 Thousand/µL      Basophils Absolute 0 04 Thousands/µL                  CT abdomen pelvis wo contrast   Final Result by Adriana Rondon MD (08/16 1021)      No acute pathology  Specifically, no left-sided urinary tract calculi or hydronephrosis        Simple and complex left renal cyst is not significantly changed from previous examination  It should be noted that left-sided pyelonephritis could be present without noncontrast CT findings  Workstation performed: [id number]                    Procedures  ECG 12 Lead Documentation Only    Date/Time: 8/16/2021 9:47 AM  Performed by: Sole Dodge MD  Authorized by: Sole Dodge MD     ECG reviewed by me, the ED Provider: yes    Patient location:  ED  Previous ECG:     Previous ECG:  Compared to current    Comparison ECG info:  7/7/2021    Similarity:  No change  Interpretation:     Interpretation: non-specific    Rate:     ECG rate:  72    ECG rate assessment: normal    Rhythm:     Rhythm: sinus rhythm    QRS:     QRS axis:  Normal    QRS intervals:  Normal  Conduction:     Conduction: normal    ST segments:     ST segments:  Normal  T waves:     T waves: normal    Comments:      Septal infarct, age undetermined             ED Course                             SBIRT 20yo+      Most Recent Value   SBIRT (24 yo +)   In order to provide better care to our patients, we are screening all of our patients for alcohol and drug use  Would it be okay to ask you these screening questions? No Filed at: 08/16/2021 0859   Initial Alcohol Screen: US AUDIT-C    1  How often do you have a drink containing alcohol?  0 Filed at: 08/16/2021 0859   2  How many drinks containing alcohol do you have on a typical day you are drinking? 0 Filed at: 08/16/2021 0859   3a  Male UNDER 65: How often do you have five or more drinks on one occasion? 0 Filed at: 08/16/2021 0859   3b  FEMALE Any Age, or MALE 65+: How often do you have 4 or more drinks on one occassion? 0 Filed at: 08/16/2021 0859   Audit-C Score  0 Filed at: 08/16/2021 0859                    MDM  Number of Diagnoses or Management Options  Abdominal pain: new and requires workup  Diagnosis management comments: 1  Abdominal pain - Pt with intermittent burning pain, LUQ and flank   Will check ECG and troponin as he does point to the Lower chest, CBC, metabolic panel for electrolyte abnormalities and dehydration,  LFT's to assess GB dysfunction, lipase for pancreatitis, Will CT abdomen to assess possible kidney stone, pt is at low risk for diverticulitis with a normal colonoscopy 3 months prior  Will check urine for infection  Discussed pt symptoms again, explained the CT cannot rule out pyelonephritis, however Pt's urine does not show signs of infection  Pt denies any dysuria, states occasionally he will has some hesitancy but this is an ongoing issue and has not changed  Amount and/or Complexity of Data Reviewed  Clinical lab tests: ordered and reviewed  Tests in the radiology section of CPT®: ordered and reviewed  Review and summarize past medical records: yes  Independent visualization of images, tracings, or specimens: yes    Patient Progress  Patient progress: stable      Disposition  Final diagnoses:   Abdominal pain     Time reflects when diagnosis was documented in both MDM as applicable and the Disposition within this note     Time User Action Codes Description Comment    8/16/2021 11:32 AM Jade Douglass [R10 9] Abdominal pain       ED Disposition     ED Disposition Condition Date/Time Comment    Discharge Stable Mon Aug 16, 2021 11:31 AM Mendez Fregoso discharge to home/self care              Follow-up Information     Follow up With Specialties Details Why Contact Info    Luna Mcghee MD Family Medicine   [address] [address]            Discharge Medication List as of 8/16/2021 11:33 AM      START taking these medications    Details   hyoscyamine (ANASPAZ,LEVSIN) 0 125 MG tablet Take 1 tablet (0 125 mg total) by mouth every 4 (four) hours as needed for cramping, Starting Mon 8/16/2021, Normal         CONTINUE these medications which have NOT CHANGED    Details   ascorbic acid (VITAMIN C) 500 MG tablet Take 500 mg by mouth daily, Historical Med      aspirin 81 mg chewable tablet Chew, Historical Med      atorvastatin (LIPITOR) 40 mg tablet Take 40 mg by mouth daily, Historical Med      carvedilol (COREG) 25 mg tablet Take 1 tablet (25 mg total) by mouth 2 (two) times a day with meals, Starting Mon 4/12/2021, Until Tue 4/12/2022, Normal      cyclobenzaprine (FLEXERIL) 10 mg tablet Take 10 mg by mouth 3 (three) times a day as needed, Historical Med      levocetirizine (XYZAL) 5 MG tablet Take 5 mg by mouth every morning, Starting Wed 7/29/2020, Historical Med      linaCLOtide (Linzess) 290 MCG CAPS Take 290 mcg by mouth daily, Historical Med      LORazepam (ATIVAN) 0 5 mg tablet Starting Tue 8/25/2020, Historical Med      losartan-hydrochlorothiazide (HYZAAR) 100-12 5 MG per tablet Take 1 tablet by mouth daily Resume on 4/14, Starting Mon 4/12/2021, No Print      nortriptyline (PAMELOR) 25 mg capsule Take 25 mg by mouth daily, Starting Wed 8/5/2020, Until Thu 8/5/2021, Historical Med      pantoprazole (PROTONIX) 40 mg tablet Starting Fri 8/21/2020, Historical Med      spironolactone (ALDACTONE) 25 mg tablet Take 2 tablets (50 mg total) by mouth daily Resume on 4/14, Starting Mon 4/12/2021, No Print           No discharge procedures on file      PDMP Review     None          ED Provider  Electronically Signed by           Shayla Ornelas MD  08/16/21 6541

## 2021-08-16 NOTE — DISCHARGE INSTRUCTIONS
You can try the Levsin for pain  Call your GI doctor, you should be seen in the office for further evaluation and management

## 2021-09-17 ENCOUNTER — HOSPITAL ENCOUNTER (EMERGENCY)
Facility: HOSPITAL | Age: 51
Discharge: HOME | End: 2021-09-17
Attending: EMERGENCY MEDICINE | Admitting: EMERGENCY MEDICINE
Payer: COMMERCIAL

## 2021-09-17 ENCOUNTER — APPOINTMENT (EMERGENCY)
Dept: CT IMAGING | Facility: HOSPITAL | Age: 51
End: 2021-09-17
Payer: COMMERCIAL

## 2021-09-17 VITALS
WEIGHT: 288.8 LBS | SYSTOLIC BLOOD PRESSURE: 123 MMHG | RESPIRATION RATE: 18 BRPM | BODY MASS INDEX: 42.78 KG/M2 | TEMPERATURE: 98 F | OXYGEN SATURATION: 99 % | HEIGHT: 69 IN | HEART RATE: 86 BPM | DIASTOLIC BLOOD PRESSURE: 74 MMHG

## 2021-09-17 DIAGNOSIS — R10.9 ABDOMINAL PAIN: ICD-10-CM

## 2021-09-17 DIAGNOSIS — N28.1 RENAL CYST: ICD-10-CM

## 2021-09-17 DIAGNOSIS — R51.9 HEADACHE: Primary | ICD-10-CM

## 2021-09-17 LAB
ALBUMIN SERPL BCP-MCNC: 3.8 G/DL (ref 3.5–5)
ALP SERPL-CCNC: 64 U/L (ref 46–116)
ALT SERPL W P-5'-P-CCNC: 35 U/L (ref 12–78)
ANION GAP SERPL CALCULATED.3IONS-SCNC: 8 MMOL/L (ref 4–13)
AST SERPL W P-5'-P-CCNC: 12 U/L (ref 5–45)
BACTERIA UR QL AUTO: ABNORMAL /HPF
BASOPHILS # BLD AUTO: 0.04 THOUSANDS/ΜL (ref 0–0.1)
BASOPHILS NFR BLD AUTO: 0 % (ref 0–1)
BILIRUB SERPL-MCNC: 0.89 MG/DL (ref 0.2–1)
BILIRUB UR QL STRIP: NEGATIVE
BUN SERPL-MCNC: 21 MG/DL (ref 5–25)
CALCIUM SERPL-MCNC: 9.1 MG/DL (ref 8.3–10.1)
CHLORIDE SERPL-SCNC: 103 MMOL/L (ref 100–108)
CLARITY UR: CLEAR
CO2 SERPL-SCNC: 26 MMOL/L (ref 21–32)
COLOR UR: YELLOW
CREAT SERPL-MCNC: 1.16 MG/DL (ref 0.6–1.3)
EOSINOPHIL # BLD AUTO: 0.61 THOUSAND/ΜL (ref 0–0.61)
EOSINOPHIL NFR BLD AUTO: 4 % (ref 0–6)
ERYTHROCYTE [DISTWIDTH] IN BLOOD BY AUTOMATED COUNT: 13.5 % (ref 11.6–15.1)
GFR SERPL CREATININE-BSD FRML MDRD: 84 ML/MIN/1.73SQ M
GLUCOSE SERPL-MCNC: 116 MG/DL (ref 65–140)
GLUCOSE UR STRIP-MCNC: NEGATIVE MG/DL
HCT VFR BLD AUTO: 49.2 % (ref 36.5–49.3)
HGB BLD-MCNC: 15.7 G/DL (ref 12–17)
HGB UR QL STRIP.AUTO: NEGATIVE
IMM GRANULOCYTES # BLD AUTO: 0.1 THOUSAND/UL (ref 0–0.2)
IMM GRANULOCYTES NFR BLD AUTO: 1 % (ref 0–2)
KETONES UR STRIP-MCNC: NEGATIVE MG/DL
LEUKOCYTE ESTERASE UR QL STRIP: NEGATIVE
LIPASE SERPL-CCNC: 247 U/L (ref 73–393)
LYMPHOCYTES # BLD AUTO: 2.71 THOUSANDS/ΜL (ref 0.6–4.47)
LYMPHOCYTES NFR BLD AUTO: 18 % (ref 14–44)
MCH RBC QN AUTO: 26.7 PG (ref 26.8–34.3)
MCHC RBC AUTO-ENTMCNC: 31.9 G/DL (ref 31.4–37.4)
MCV RBC AUTO: 84 FL (ref 82–98)
MONOCYTES # BLD AUTO: 1.13 THOUSAND/ΜL (ref 0.17–1.22)
MONOCYTES NFR BLD AUTO: 8 % (ref 4–12)
MUCOUS THREADS UR QL AUTO: ABNORMAL
NEUTROPHILS # BLD AUTO: 10.4 THOUSANDS/ΜL (ref 1.85–7.62)
NEUTS SEG NFR BLD AUTO: 69 % (ref 43–75)
NITRITE UR QL STRIP: NEGATIVE
NON-SQ EPI CELLS URNS QL MICRO: ABNORMAL /HPF
NRBC BLD AUTO-RTO: 0 /100 WBCS
PH UR STRIP.AUTO: 5.5 [PH] (ref 4.5–8)
PLATELET # BLD AUTO: 297 THOUSANDS/UL (ref 149–390)
PMV BLD AUTO: 9.4 FL (ref 8.9–12.7)
POTASSIUM SERPL-SCNC: 4.7 MMOL/L (ref 3.5–5.3)
PROT SERPL-MCNC: 7.4 G/DL (ref 6.4–8.2)
PROT UR STRIP-MCNC: ABNORMAL MG/DL
RBC # BLD AUTO: 5.88 MILLION/UL (ref 3.88–5.62)
RBC #/AREA URNS AUTO: ABNORMAL /HPF
SODIUM SERPL-SCNC: 137 MMOL/L (ref 136–145)
SP GR UR STRIP.AUTO: >=1.03 (ref 1–1.03)
UROBILINOGEN UR QL STRIP.AUTO: 0.2 E.U./DL
WBC # BLD AUTO: 14.99 THOUSAND/UL (ref 4.31–10.16)
WBC #/AREA URNS AUTO: ABNORMAL /HPF

## 2021-09-17 PROCEDURE — 83690 ASSAY OF LIPASE: CPT | Performed by: PHYSICIAN ASSISTANT

## 2021-09-17 PROCEDURE — 85025 COMPLETE CBC W/AUTO DIFF WBC: CPT | Performed by: PHYSICIAN ASSISTANT

## 2021-09-17 PROCEDURE — 96375 TX/PRO/DX INJ NEW DRUG ADDON: CPT

## 2021-09-17 PROCEDURE — 99284 EMERGENCY DEPT VISIT MOD MDM: CPT | Performed by: PHYSICIAN ASSISTANT

## 2021-09-17 PROCEDURE — G1004 CDSM NDSC: HCPCS

## 2021-09-17 PROCEDURE — 96374 THER/PROPH/DIAG INJ IV PUSH: CPT

## 2021-09-17 PROCEDURE — 99284 EMERGENCY DEPT VISIT MOD MDM: CPT

## 2021-09-17 PROCEDURE — 74177 CT ABD & PELVIS W/CONTRAST: CPT

## 2021-09-17 PROCEDURE — 96361 HYDRATE IV INFUSION ADD-ON: CPT

## 2021-09-17 PROCEDURE — 80053 COMPREHEN METABOLIC PANEL: CPT | Performed by: PHYSICIAN ASSISTANT

## 2021-09-17 PROCEDURE — 36415 COLL VENOUS BLD VENIPUNCTURE: CPT | Performed by: PHYSICIAN ASSISTANT

## 2021-09-17 PROCEDURE — 81001 URINALYSIS AUTO W/SCOPE: CPT

## 2021-09-17 RX ORDER — BUTALBITAL, ACETAMINOPHEN AND CAFFEINE 50; 325; 40 MG/1; MG/1; MG/1
1 TABLET ORAL EVERY 6 HOURS PRN
Qty: 10 TABLET | Refills: 0 | Status: SHIPPED | OUTPATIENT
Start: 2021-09-17

## 2021-09-17 RX ORDER — METOCLOPRAMIDE HYDROCHLORIDE 5 MG/ML
10 INJECTION INTRAMUSCULAR; INTRAVENOUS ONCE
Status: COMPLETED | OUTPATIENT
Start: 2021-09-17 | End: 2021-09-17

## 2021-09-17 RX ORDER — DIPHENHYDRAMINE HYDROCHLORIDE 50 MG/ML
25 INJECTION INTRAMUSCULAR; INTRAVENOUS ONCE
Status: COMPLETED | OUTPATIENT
Start: 2021-09-17 | End: 2021-09-17

## 2021-09-17 RX ORDER — KETOROLAC TROMETHAMINE 30 MG/ML
15 INJECTION, SOLUTION INTRAMUSCULAR; INTRAVENOUS ONCE
Status: COMPLETED | OUTPATIENT
Start: 2021-09-17 | End: 2021-09-17

## 2021-09-17 RX ADMIN — SODIUM CHLORIDE 1000 ML: 0.9 INJECTION, SOLUTION INTRAVENOUS at 10:28

## 2021-09-17 RX ADMIN — DIPHENHYDRAMINE HYDROCHLORIDE 25 MG: 50 INJECTION, SOLUTION INTRAMUSCULAR; INTRAVENOUS at 10:28

## 2021-09-17 RX ADMIN — METOCLOPRAMIDE 10 MG: 5 INJECTION, SOLUTION INTRAMUSCULAR; INTRAVENOUS at 10:29

## 2021-09-17 RX ADMIN — KETOROLAC TROMETHAMINE 15 MG: 30 INJECTION, SOLUTION INTRAMUSCULAR; INTRAVENOUS at 10:28

## 2021-09-17 RX ADMIN — IOHEXOL 100 ML: 350 INJECTION, SOLUTION INTRAVENOUS at 12:04

## 2021-09-17 NOTE — ED NOTES
Pt aware of necessary urine specimen  Pt states he is unable to void at this time        Poli Holloway  09/17/21 1107

## 2021-09-17 NOTE — Clinical Note
Janet Souza was seen and treated in our emergency department on 9/17/2021  Diagnosis:     Krystle Pisano    He may return on this date: 09/20/2021         If you have any questions or concerns, please don't hesitate to call        Yohan Hutchinson PA-C    ______________________________           _______________          _______________  Hospital Representative                              Date                                Time

## 2021-09-19 NOTE — ED PROVIDER NOTES
History  Chief Complaint   Patient presents with    Headache     headaaches x 3 days, described as a banding pain  taking meds prescribed as neurologist for pain    Abdominal Pain     lower abdominal pain  pain under right axilla  Fang Sun is a 45 yo M, history of migraine headaches, HTN, HLD, history of R nephrectomy presenting with headache for the past 3 days, as well as intermittent abdominal/flank pain just inferior to ribcage on R side, as well as mild pain to L lower abdomen with onset today  He reports the headache feels like a squeezing band around his whole head, and he reports it feels different from regular migraines, although it is not worse than prior  Denies associated light/sound sensitivity  Denies N/V  He notes he did have this headache intermittently over the past week as well, and was prescribed compazine and decadron per neurology, although he notes it did not help during that episode  He reports pain to R flank/abdomen has been occurring intermittently for a "a while", although nothing seems to make it better or worse or provoke symptoms  Pain does not worsen following eating/drinking, with movement, or with touching area  He reports onset of left lower abdominal pain today which is mild in intensity  No associated N/V/D, melena, or blood in stool/rectal bleeding  No fevers/chills  No dysuria, urinary urgency, or urinary frequency  History provided by:  Patient   used: No    Abdominal Pain  Associated symptoms: no chest pain, no chills, no constipation, no cough, no diarrhea, no dysuria, no fever, no nausea, no shortness of breath, no sore throat and no vomiting        Prior to Admission Medications   Prescriptions Last Dose Informant Patient Reported? Taking?    LORazepam (ATIVAN) 0 5 mg tablet   Yes No   ascorbic acid (VITAMIN C) 500 MG tablet   Yes No   Sig: Take 500 mg by mouth daily   aspirin 81 mg chewable tablet   Yes Yes   Sig: Chew   atorvastatin (LIPITOR) 40 mg tablet   Yes Yes   Sig: Take 40 mg by mouth daily   carvedilol (COREG) 25 mg tablet   No Yes   Sig: Take 1 tablet (25 mg total) by mouth 2 (two) times a day with meals   cyclobenzaprine (FLEXERIL) 10 mg tablet   Yes No   Sig: Take 10 mg by mouth 3 (three) times a day as needed   dicyclomine (BENTYL) 20 mg tablet   No No   Sig: Take 1 tablet (20 mg total) by mouth every 6 (six) hours   hyoscyamine (ANASPAZ,LEVSIN) 0 125 MG tablet   No No   Sig: Take 1 tablet (0 125 mg total) by mouth every 4 (four) hours as needed for cramping   levocetirizine (XYZAL) 5 MG tablet   Yes No   Sig: Take 5 mg by mouth every morning   linaCLOtide (Linzess) 290 MCG CAPS   Yes No   Sig: Take 290 mcg by mouth daily   losartan-hydrochlorothiazide (HYZAAR) 100-12 5 MG per tablet   No Yes   Sig: Take 1 tablet by mouth daily Resume on 4/14   nicotine (NICODERM CQ) 7 mg/24hr TD 24 hr patch   Yes No   Sig: Place 1 patch on the skin daily   nortriptyline (PAMELOR) 50 mg capsule   Yes No   Sig: Take 50 mg by mouth daily at bedtime   pantoprazole (PROTONIX) 20 mg tablet   No No   Sig: Take 1 tablet (20 mg total) by mouth daily   sildenafil (REVATIO) 20 mg tablet   Yes No   Sig: TAKE THREE TABLETS BY MOUTH ONE HOUR PRIOR AS DIRECTED   spironolactone (ALDACTONE) 25 mg tablet   No Yes   Sig: Take 2 tablets (50 mg total) by mouth daily Resume on 4/14   topiramate (TOPAMAX) 25 mg tablet   Yes Yes   Sig: topiramate 25 mg tablet   take 1 tablet by mouth three times a day   triamcinolone (KENALOG) 0 1 % cream   Yes No   Sig: apply twice a day to rash ON face for 2 weeks then STOP      Facility-Administered Medications: None       Past Medical History:   Diagnosis Date    Eosinophilic esophagitis     History of kidney cancer     2007    Hyperlipidemia     Hypertension     Renal disorder        Past Surgical History:   Procedure Laterality Date    COLONOSCOPY  08/04/2015    Normal by Dr Bev Morales at [address] [place] [address] 12/16/2016    Diverticulosis otherwise normal by Brooks Huerta    COLONOSCOPY  05/14/2021    normal by Dr Rajiv Rosa Right     Renal cell carcinoma    UPPER GASTROINTESTINAL ENDOSCOPY  08/2020    Biopsy positive for eosinophilic esophagitis       Family History   Problem Relation Age of Onset    Lymphoma Mother      I have reviewed and agree with the history as documented  E-Cigarette/Vaping    E-Cigarette Use Never User      E-Cigarette/Vaping Substances     Social History     Tobacco Use    Smoking status: Current Every Day Smoker     Packs/day: 0 25     Types: Cigarettes    Smokeless tobacco: Never Used   Vaping Use    Vaping Use: Never used   Substance Use Topics    Alcohol use: Never    Drug use: Not Currently       Review of Systems   Constitutional: Negative for chills and fever  HENT: Negative for congestion, rhinorrhea and sore throat  Eyes: Negative for pain and visual disturbance  Respiratory: Negative for cough, shortness of breath and wheezing  Cardiovascular: Negative for chest pain and palpitations  Gastrointestinal: Positive for abdominal pain  Negative for constipation, diarrhea, nausea and vomiting  Genitourinary: Negative for dysuria, frequency, scrotal swelling, testicular pain and urgency  Musculoskeletal: Negative for back pain, neck pain and neck stiffness  Skin: Negative for rash and wound  Neurological: Positive for headaches  Negative for dizziness, syncope, speech difficulty, weakness, light-headedness and numbness  Physical Exam  Physical Exam  Constitutional:       General: He is not in acute distress  Appearance: He is well-developed  He is not diaphoretic  HENT:      Head: Normocephalic and atraumatic  Right Ear: External ear normal       Left Ear: External ear normal    Eyes:      Conjunctiva/sclera: Conjunctivae normal       Pupils: Pupils are equal, round, and reactive to light     Cardiovascular: Rate and Rhythm: Normal rate and regular rhythm  Heart sounds: Normal heart sounds  No murmur heard  No friction rub  No gallop  Pulmonary:      Effort: Pulmonary effort is normal  No respiratory distress  Breath sounds: Normal breath sounds  No wheezing, rhonchi or rales  Chest:      Chest wall: No tenderness  Abdominal:      General: There is no distension  Palpations: Abdomen is soft  Tenderness: There is abdominal tenderness  There is no right CVA tenderness, left CVA tenderness or guarding  Comments: TTP to L lower abdomen  No further abdominal TTP  No abdominal rigidity, rebound, or guarding  No CVAT  Neg Rodney's  Musculoskeletal:      Cervical back: Normal range of motion and neck supple  Lymphadenopathy:      Cervical: No cervical adenopathy  Skin:     General: Skin is warm and dry  Capillary Refill: Capillary refill takes less than 2 seconds  Findings: No erythema or rash  Neurological:      Mental Status: He is alert and oriented to person, place, and time  GCS: GCS eye subscore is 4  GCS verbal subscore is 5  GCS motor subscore is 6  Cranial Nerves: Cranial nerves are intact  No cranial nerve deficit, dysarthria or facial asymmetry  Sensory: Sensation is intact  No sensory deficit  Motor: Motor function is intact  No weakness or abnormal muscle tone  Coordination: Coordination is intact  Coordination normal  Finger-Nose-Finger Test and Heel to [facility] Test normal       Gait: Gait is intact  Psychiatric:         Behavior: Behavior normal          Thought Content:  Thought content normal          Judgment: Judgment normal          Vital Signs  ED Triage Vitals   Temperature Pulse Respirations Blood Pressure SpO2   09/17/21 0938 09/17/21 0938 09/17/21 0938 09/17/21 0938 09/17/21 0938   98 °F (36 7 °C) 101 19 151/91 95 %      Temp Source Heart Rate Source Patient Position - Orthostatic VS BP Location FiO2 (%)   09/17/21 0938 09/17/21 9428 09/17/21 1139 09/17/21 1139 --   Oral Monitor Lying Right arm       Pain Score       09/17/21 0938       Worst Possible Pain           Vitals:    09/17/21 0938 09/17/21 1139 09/17/21 1244   BP: 151/91 135/72 123/74   Pulse: 101 88 86   Patient Position - Orthostatic VS:  Lying Lying         Visual Acuity  Visual Acuity      Most Recent Value   L Pupil Size (mm)  4   R Pupil Size (mm)  4          ED Medications  Medications   ketorolac (TORADOL) injection 15 mg (15 mg Intravenous Given 9/17/21 1028)   metoclopramide (REGLAN) injection 10 mg (10 mg Intravenous Given 9/17/21 1029)   diphenhydrAMINE (BENADRYL) injection 25 mg (25 mg Intravenous Given 9/17/21 1028)   sodium chloride 0 9 % bolus 1,000 mL (0 mL Intravenous Stopped 9/17/21 1146)   iohexol (OMNIPAQUE) 350 MG/ML injection (SINGLE-DOSE) 100 mL (100 mL Intravenous Given 9/17/21 1204)       Diagnostic Studies  Results Reviewed     Procedure Component Value Units Date/Time    Urine Microscopic [[id number]]  (Abnormal) Collected: 09/17/21 1136    Lab Status: Final result Specimen: Urine, Clean Catch Updated: 09/17/21 1222     RBC, UA None Seen /hpf      WBC, UA 1-2 /hpf      Epithelial Cells Occasional /hpf      Bacteria, UA Occasional /hpf      MUCUS THREADS Occasional    Urine Macroscopic, POC [[id number]]  (Abnormal) Collected: 09/17/21 1136    Lab Status: Final result Specimen: Urine Updated: 09/17/21 1138     Color, UA Yellow     Clarity, UA Clear     pH, UA 5 5     Leukocytes, UA Negative     Nitrite, UA Negative     Protein,  (2+) mg/dl      Glucose, UA Negative mg/dl      Ketones, UA Negative mg/dl      Urobilinogen, UA 0 2 E U /dl      Bilirubin, UA Negative     Blood, UA Negative     Specific Gravity, UA >=1 030    Narrative:      CLINITEK RESULT    Comprehensive metabolic panel [[id number]] Collected: 09/17/21 1025    Lab Status: Final result Specimen: Blood from Arm, Right Updated: 09/17/21 1104     Sodium 137 mmol/L      Potassium 4 7 mmol/L Chloride 103 mmol/L      CO2 26 mmol/L      ANION GAP 8 mmol/L      BUN 21 mg/dL      Creatinine 1 16 mg/dL      Glucose 116 mg/dL      Calcium 9 1 mg/dL      AST 12 U/L      ALT 35 U/L      Alkaline Phosphatase 64 U/L      Total Protein 7 4 g/dL      Albumin 3 8 g/dL      Total Bilirubin 0 89 mg/dL      eGFR 84 ml/min/1 73sq m     Narrative:      National Kidney Disease Foundation guidelines for Chronic Kidney Disease (CKD):     Stage 1 with normal or high GFR (GFR > 90 mL/min/1 73 square meters)    Stage 2 Mild CKD (GFR = 60-89 mL/min/1 73 square meters)    Stage 3A Moderate CKD (GFR = 45-59 mL/min/1 73 square meters)    Stage 3B Moderate CKD (GFR = 30-44 mL/min/1 73 square meters)    Stage 4 Severe CKD (GFR = 15-29 mL/min/1 73 square meters)    Stage 5 End Stage CKD (GFR <15 mL/min/1 73 square meters)  Note: GFR calculation is accurate only with a steady state creatinine    Lipase [[id number]]  (Normal) Collected: 09/17/21 1025    Lab Status: Final result Specimen: Blood from Arm, Right Updated: 09/17/21 1104     Lipase 247 u/L     CBC and differential [[id number]]  (Abnormal) Collected: 09/17/21 1025    Lab Status: Final result Specimen: Blood from Arm, Right Updated: 09/17/21 1032     WBC 14 99 Thousand/uL      RBC 5 88 Million/uL      Hemoglobin 15 7 g/dL      Hematocrit 49 2 %      MCV 84 fL      MCH 26 7 pg      MCHC 31 9 g/dL      RDW 13 5 %      MPV 9 4 fL      Platelets 931 Thousands/uL      nRBC 0 /100 WBCs      Neutrophils Relative 69 %      Immat GRANS % 1 %      Lymphocytes Relative 18 %      Monocytes Relative 8 %      Eosinophils Relative 4 %      Basophils Relative 0 %      Neutrophils Absolute 10 40 Thousands/µL      Immature Grans Absolute 0 10 Thousand/uL      Lymphocytes Absolute 2 71 Thousands/µL      Monocytes Absolute 1 13 Thousand/µL      Eosinophils Absolute 0 61 Thousand/µL      Basophils Absolute 0 04 Thousands/µL                  CT abdomen pelvis with contrast   Final Result by Katerine Clark MD (09/17 8878)      1  Note that the lower pelvis including portions of the bladder, prostate, and rectum were not included on the study  2   No acute abnormality in the visualized abdomen or pelvis      3  Multiple renal cysts and small mildly hyperdense lesions  These likely represent hemorrhagic cysts though are indeterminate  Attention on follow-up for renal cell carcinoma recommended  4   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 Unable to answer at this time Filed at: 09/17/2021 0953                    MDM  Number of Diagnoses or Management Options  Abdominal pain  Headache  Renal cyst  Diagnosis management comments: Band-like, squeezing headache intermittent over past week but constant for past 3 days  Reportedly distinct from but not worse than previous migraine headaches  No associated light/sound sensitivity or N/V  Neurologic exam is nonfocal  History/exam suspicious for tension headache  Will provide IV medications for headache including toradol/reglan/diphenhydramine, IV fluids  Intermittent RUQ/R sided pain as well as LLQ pain since this am  LLQ TTP noted without further abdominal TTP or peritoneal signs on exam  Neg Rodney's  Will check abdominal labs including CBC for leukocytosis (although baseline appears elevated), CMP for LFT's/electrolytes, lipase for pancreatitis, check urine dip for hematuria/UTI  Check CT abd/pelvis         Amount and/or Complexity of Data Reviewed  Clinical lab tests: reviewed and ordered  Tests in the radiology section of CPT®: reviewed and ordered    Patient Progress  Patient progress: improved      Disposition  Final diagnoses:   Headache   Abdominal pain   Renal cyst     Time reflects when diagnosis was documented in both MDM as applicable and the Disposition within this note     Time User Action Codes Description Comment    9/17/2021  1:04 PM Christnic Ricoe Add [R51 9] Headache     9/17/2021  1:04 PM Kiley Ricoe Add [R10 9] Abdominal pain     9/17/2021  1:07 PM Kiley Ricoe Add [N28 1] Renal cyst       ED Disposition     ED Disposition Condition Date/Time Comment    Discharge Stable Fri Sep 17, 2021 12:39 PM Danielle Batemande discharge to home/self care              Follow-up Information     Follow up With Specialties Details Why Contact Info Additional Information    Jae Garcia MD Family Medicine Schedule an appointment as soon as possible for a visit   [facility] Emergency Medicine  If symptoms worsen [facility] [id number]  [address] [facility] Emergency Department, [address] [place] [address], [facility]          Discharge Medication List as of 9/17/2021  1:08 PM      START taking these medications    Details   butalbital-acetaminophen-caffeine (FIORICET,ESGIC) -40 mg per tablet Take 1 tablet by mouth every 6 (six) hours as needed for headaches for up to 10 doses, Starting Fri 9/17/2021, Normal         CONTINUE these medications which have NOT CHANGED    Details   ascorbic acid (VITAMIN C) 500 MG tablet Take 500 mg by mouth daily, Historical Med      aspirin 81 mg chewable tablet Chew, Historical Med      atorvastatin (LIPITOR) 40 mg tablet Take 40 mg by mouth daily, Historical Med      carvedilol (COREG) 25 mg tablet Take 1 tablet (25 mg total) by mouth 2 (two) times a day with meals, Starting Mon 4/12/2021, Until Tue 4/12/2022, Normal      cyclobenzaprine (FLEXERIL) 10 mg tablet Take 10 mg by mouth 3 (three) times a day as needed, Historical Med      dicyclomine (BENTYL) 20 mg tablet Take 1 tablet (20 mg total) by mouth every 6 (six) hours, Starting Mon 8/23/2021, Until Wed 9/22/2021, Normal      hyoscyamine (ANASPAZ,LEVSIN) 0 125 MG tablet Take 1 tablet (0 125 mg total) by mouth every 4 (four) hours as needed for cramping, Starting Mon 8/16/2021, Normal      levocetirizine (XYZAL) 5 MG tablet Take 5 mg by mouth every morning, Starting Wed 7/29/2020, Historical Med      linaCLOtide (Linzess) 290 MCG CAPS Take 290 mcg by mouth daily, Historical Med      LORazepam (ATIVAN) 0 5 mg tablet Starting Tue 8/25/2020, Historical Med      losartan-hydrochlorothiazide (HYZAAR) 100-12 5 MG per tablet Take 1 tablet by mouth daily Resume on 4/14, Starting Mon 4/12/2021, No Print      nicotine (NICODERM CQ) 7 mg/24hr TD 24 hr patch Place 1 patch on the skin daily, Starting Wed 5/26/2021, Historical Med      nortriptyline (PAMELOR) 50 mg capsule Take 50 mg by mouth daily at bedtime, Starting Mon 7/12/2021, Historical Med      pantoprazole (PROTONIX) 20 mg tablet Take 1 tablet (20 mg total) by mouth daily, Starting Thu 5/27/2021, Until Sat 6/26/2021, Normal      sildenafil (REVATIO) 20 mg tablet TAKE THREE TABLETS BY MOUTH ONE HOUR PRIOR AS DIRECTED, Historical Med      spironolactone (ALDACTONE) 25 mg tablet Take 2 tablets (50 mg total) by mouth daily Resume on 4/14, Starting Mon 4/12/2021, No Print      topiramate (TOPAMAX) 25 mg tablet topiramate 25 mg tablet   take 1 tablet by mouth three times a day, Historical Med      triamcinolone (KENALOG) 0 1 % cream apply twice a day to rash ON face for 2 weeks then STOP, Historical Med           No discharge procedures on file      PDMP Review     None          ED Provider  Electronically Signed by           Joe Ellison PA-C  09/19/21 2062

## 2021-09-21 ENCOUNTER — HOSPITAL ENCOUNTER (OUTPATIENT)
Dept: RADIOLOGY | Facility: HOSPITAL | Age: 51
Discharge: HOME | End: 2021-09-21
Payer: COMMERCIAL

## 2021-09-21 DIAGNOSIS — R13.10 PROBLEMS WITH SWALLOWING AND MASTICATION: ICD-10-CM

## 2021-09-21 PROCEDURE — 74220 X-RAY XM ESOPHAGUS 1CNTRST: CPT

## 2021-12-04 ENCOUNTER — APPOINTMENT (EMERGENCY)
Dept: RADIOLOGY | Facility: HOSPITAL | Age: 51
End: 2021-12-04
Payer: COMMERCIAL

## 2021-12-04 ENCOUNTER — HOSPITAL ENCOUNTER (EMERGENCY)
Facility: HOSPITAL | Age: 51
Discharge: HOME | End: 2021-12-04
Attending: EMERGENCY MEDICINE | Admitting: EMERGENCY MEDICINE
Payer: COMMERCIAL

## 2021-12-04 VITALS
OXYGEN SATURATION: 97 % | WEIGHT: 308.42 LBS | BODY MASS INDEX: 45.55 KG/M2 | HEART RATE: 94 BPM | DIASTOLIC BLOOD PRESSURE: 88 MMHG | SYSTOLIC BLOOD PRESSURE: 136 MMHG | TEMPERATURE: 98.1 F | RESPIRATION RATE: 20 BRPM

## 2021-12-04 DIAGNOSIS — R07.9 CHEST PAIN WITH LOW RISK FOR CARDIAC ETIOLOGY: Primary | ICD-10-CM

## 2021-12-04 LAB
ALBUMIN SERPL BCP-MCNC: 3.7 G/DL (ref 3.5–5)
ALP SERPL-CCNC: 67 U/L (ref 46–116)
ALT SERPL W P-5'-P-CCNC: 22 U/L (ref 12–78)
ANION GAP SERPL CALCULATED.3IONS-SCNC: 10 MMOL/L (ref 4–13)
AST SERPL W P-5'-P-CCNC: 11 U/L (ref 5–45)
BASOPHILS # BLD AUTO: 0.05 THOUSANDS/ΜL (ref 0–0.1)
BASOPHILS NFR BLD AUTO: 0 % (ref 0–1)
BILIRUB SERPL-MCNC: 0.25 MG/DL (ref 0.2–1)
BUN SERPL-MCNC: 24 MG/DL (ref 5–25)
CALCIUM SERPL-MCNC: 9.2 MG/DL (ref 8.3–10.1)
CARDIAC TROPONIN I PNL SERPL HS: 4 NG/L
CHLORIDE SERPL-SCNC: 100 MMOL/L (ref 100–108)
CO2 SERPL-SCNC: 27 MMOL/L (ref 21–32)
CREAT SERPL-MCNC: 1.63 MG/DL (ref 0.6–1.3)
EOSINOPHIL # BLD AUTO: 0.66 THOUSAND/ΜL (ref 0–0.61)
EOSINOPHIL NFR BLD AUTO: 5 % (ref 0–6)
ERYTHROCYTE [DISTWIDTH] IN BLOOD BY AUTOMATED COUNT: 13.8 % (ref 11.6–15.1)
GFR SERPL CREATININE-BSD FRML MDRD: 56 ML/MIN/1.73SQ M
GLUCOSE SERPL-MCNC: 117 MG/DL (ref 65–140)
HCT VFR BLD AUTO: 45.3 % (ref 36.5–49.3)
HGB BLD-MCNC: 14.5 G/DL (ref 12–17)
IMM GRANULOCYTES # BLD AUTO: 0.03 THOUSAND/UL (ref 0–0.2)
IMM GRANULOCYTES NFR BLD AUTO: 0 % (ref 0–2)
LYMPHOCYTES # BLD AUTO: 2.82 THOUSANDS/ΜL (ref 0.6–4.47)
LYMPHOCYTES NFR BLD AUTO: 22 % (ref 14–44)
MCH RBC QN AUTO: 27.1 PG (ref 26.8–34.3)
MCHC RBC AUTO-ENTMCNC: 32 G/DL (ref 31.4–37.4)
MCV RBC AUTO: 85 FL (ref 82–98)
MONOCYTES # BLD AUTO: 1.09 THOUSAND/ΜL (ref 0.17–1.22)
MONOCYTES NFR BLD AUTO: 9 % (ref 4–12)
NEUTROPHILS # BLD AUTO: 8.11 THOUSANDS/ΜL (ref 1.85–7.62)
NEUTS SEG NFR BLD AUTO: 64 % (ref 43–75)
NRBC BLD AUTO-RTO: 0 /100 WBCS
PLATELET # BLD AUTO: 263 THOUSANDS/UL (ref 149–390)
PMV BLD AUTO: 9.5 FL (ref 8.9–12.7)
POTASSIUM SERPL-SCNC: 4.5 MMOL/L (ref 3.5–5.3)
PROT SERPL-MCNC: 7.3 G/DL (ref 6.4–8.2)
RBC # BLD AUTO: 5.35 MILLION/UL (ref 3.88–5.62)
SODIUM SERPL-SCNC: 137 MMOL/L (ref 136–145)
WBC # BLD AUTO: 12.76 THOUSAND/UL (ref 4.31–10.16)

## 2021-12-04 PROCEDURE — 71046 X-RAY EXAM CHEST 2 VIEWS: CPT

## 2021-12-04 PROCEDURE — 99284 EMERGENCY DEPT VISIT MOD MDM: CPT | Performed by: EMERGENCY MEDICINE

## 2021-12-04 PROCEDURE — 36415 COLL VENOUS BLD VENIPUNCTURE: CPT | Performed by: EMERGENCY MEDICINE

## 2021-12-04 PROCEDURE — 99285 EMERGENCY DEPT VISIT HI MDM: CPT

## 2021-12-04 PROCEDURE — 84484 ASSAY OF TROPONIN QUANT: CPT | Performed by: EMERGENCY MEDICINE

## 2021-12-04 PROCEDURE — 85025 COMPLETE CBC W/AUTO DIFF WBC: CPT | Performed by: EMERGENCY MEDICINE

## 2021-12-04 PROCEDURE — 93005 ELECTROCARDIOGRAM TRACING: CPT

## 2021-12-04 PROCEDURE — 80053 COMPREHEN METABOLIC PANEL: CPT | Performed by: EMERGENCY MEDICINE

## 2021-12-05 LAB
ATRIAL RATE: 84 BPM
P AXIS: 78 DEGREES
PR INTERVAL: 180 MS
QRS AXIS: 47 DEGREES
QRSD INTERVAL: 88 MS
QT INTERVAL: 372 MS
QTC INTERVAL: 439 MS
T WAVE AXIS: 60 DEGREES
VENTRICULAR RATE: 84 BPM

## 2021-12-05 PROCEDURE — 93010 ELECTROCARDIOGRAM REPORT: CPT | Performed by: INTERNAL MEDICINE

## 2022-02-14 ENCOUNTER — HOSPITAL ENCOUNTER (EMERGENCY)
Facility: HOSPITAL | Age: 52
Discharge: HOME | End: 2022-02-14
Attending: EMERGENCY MEDICINE | Admitting: EMERGENCY MEDICINE
Payer: COMMERCIAL

## 2022-02-14 ENCOUNTER — APPOINTMENT (EMERGENCY)
Dept: RADIOLOGY | Facility: HOSPITAL | Age: 52
End: 2022-02-14
Payer: COMMERCIAL

## 2022-02-14 VITALS
OXYGEN SATURATION: 98 % | SYSTOLIC BLOOD PRESSURE: 120 MMHG | WEIGHT: 313.05 LBS | TEMPERATURE: 97.6 F | BODY MASS INDEX: 47.6 KG/M2 | HEART RATE: 91 BPM | DIASTOLIC BLOOD PRESSURE: 78 MMHG | RESPIRATION RATE: 20 BRPM

## 2022-02-14 DIAGNOSIS — R05.9 COUGH: ICD-10-CM

## 2022-02-14 DIAGNOSIS — R19.7 DIARRHEA: ICD-10-CM

## 2022-02-14 DIAGNOSIS — R07.9 CHEST PAIN: Primary | ICD-10-CM

## 2022-02-14 LAB
ALBUMIN SERPL BCP-MCNC: 3.7 G/DL (ref 3.5–5)
ALP SERPL-CCNC: 66 U/L (ref 46–116)
ALT SERPL W P-5'-P-CCNC: 19 U/L (ref 12–78)
ANION GAP SERPL CALCULATED.3IONS-SCNC: 4 MMOL/L (ref 4–13)
AST SERPL W P-5'-P-CCNC: 12 U/L (ref 5–45)
ATRIAL RATE: 86 BPM
BASOPHILS # BLD AUTO: 0.03 THOUSANDS/ΜL (ref 0–0.1)
BASOPHILS NFR BLD AUTO: 0 % (ref 0–1)
BILIRUB SERPL-MCNC: 0.67 MG/DL (ref 0.2–1)
BUN SERPL-MCNC: 20 MG/DL (ref 5–25)
CALCIUM SERPL-MCNC: 8.8 MG/DL (ref 8.3–10.1)
CARDIAC TROPONIN I PNL SERPL HS: 2 NG/L
CHLORIDE SERPL-SCNC: 102 MMOL/L (ref 100–108)
CO2 SERPL-SCNC: 29 MMOL/L (ref 21–32)
CREAT SERPL-MCNC: 1.25 MG/DL (ref 0.6–1.3)
EOSINOPHIL # BLD AUTO: 0.42 THOUSAND/ΜL (ref 0–0.61)
EOSINOPHIL NFR BLD AUTO: 3 % (ref 0–6)
ERYTHROCYTE [DISTWIDTH] IN BLOOD BY AUTOMATED COUNT: 13.8 % (ref 11.6–15.1)
GFR SERPL CREATININE-BSD FRML MDRD: 66 ML/MIN/1.73SQ M
GLUCOSE SERPL-MCNC: 118 MG/DL (ref 65–140)
HCT VFR BLD AUTO: 45.8 % (ref 36.5–49.3)
HGB BLD-MCNC: 14.4 G/DL (ref 12–17)
IMM GRANULOCYTES # BLD AUTO: 0.05 THOUSAND/UL (ref 0–0.2)
IMM GRANULOCYTES NFR BLD AUTO: 0 % (ref 0–2)
LYMPHOCYTES # BLD AUTO: 0.9 THOUSANDS/ΜL (ref 0.6–4.47)
LYMPHOCYTES NFR BLD AUTO: 6 % (ref 14–44)
MCH RBC QN AUTO: 27 PG (ref 26.8–34.3)
MCHC RBC AUTO-ENTMCNC: 31.4 G/DL (ref 31.4–37.4)
MCV RBC AUTO: 86 FL (ref 82–98)
MONOCYTES # BLD AUTO: 0.74 THOUSAND/ΜL (ref 0.17–1.22)
MONOCYTES NFR BLD AUTO: 5 % (ref 4–12)
NEUTROPHILS # BLD AUTO: 12.07 THOUSANDS/ΜL (ref 1.85–7.62)
NEUTS SEG NFR BLD AUTO: 86 % (ref 43–75)
NRBC BLD AUTO-RTO: 0 /100 WBCS
P AXIS: 78 DEGREES
PLATELET # BLD AUTO: 268 THOUSANDS/UL (ref 149–390)
PMV BLD AUTO: 9.4 FL (ref 8.9–12.7)
POTASSIUM SERPL-SCNC: 4.8 MMOL/L (ref 3.5–5.3)
PR INTERVAL: 186 MS
PROT SERPL-MCNC: 7.5 G/DL (ref 6.4–8.2)
QRS AXIS: 12 DEGREES
QRSD INTERVAL: 90 MS
QT INTERVAL: 368 MS
QTC INTERVAL: 440 MS
RBC # BLD AUTO: 5.34 MILLION/UL (ref 3.88–5.62)
SODIUM SERPL-SCNC: 135 MMOL/L (ref 136–145)
T WAVE AXIS: 58 DEGREES
VENTRICULAR RATE: 86 BPM
WBC # BLD AUTO: 14.21 THOUSAND/UL (ref 4.31–10.16)

## 2022-02-14 PROCEDURE — 80053 COMPREHEN METABOLIC PANEL: CPT

## 2022-02-14 PROCEDURE — 84484 ASSAY OF TROPONIN QUANT: CPT

## 2022-02-14 PROCEDURE — 93005 ELECTROCARDIOGRAM TRACING: CPT

## 2022-02-14 PROCEDURE — 71045 X-RAY EXAM CHEST 1 VIEW: CPT

## 2022-02-14 PROCEDURE — 85025 COMPLETE CBC W/AUTO DIFF WBC: CPT

## 2022-02-14 PROCEDURE — 93010 ELECTROCARDIOGRAM REPORT: CPT | Performed by: INTERNAL MEDICINE

## 2022-02-14 PROCEDURE — 99285 EMERGENCY DEPT VISIT HI MDM: CPT

## 2022-02-14 PROCEDURE — 36415 COLL VENOUS BLD VENIPUNCTURE: CPT

## 2022-02-14 PROCEDURE — 99285 EMERGENCY DEPT VISIT HI MDM: CPT | Performed by: EMERGENCY MEDICINE

## 2022-02-14 NOTE — ED ATTENDING ATTESTATION
2/14/2022  IJulio DO, saw and evaluated the patient  I have discussed the patient with the resident/non-physician practitioner and agree with the resident's/non-physician practitioner's findings, Plan of Care, and MDM as documented in the resident's/non-physician practitioner's note, except where noted  All available labs and Radiology studies were reviewed  I was present for key portions of any procedure(s) performed by the resident/non-physician practitioner and I was immediately available to provide assistance  At this point I agree with the current assessment done in the Emergency Department  I have conducted an independent evaluation of this patient a history and physical is as follows:    47 yo M h/o IBS, remote renal ca s/p nephrectomy (2007) presenting for evaluation of loose stools  States sx started when he woke up this morning  Reports 2 episodes of loose nonbloody stools and abdomen feeling crampy  No localized area of pain  Denies N/V  States history of IBS and typically has issues with constipation, took Linzess few days ago and did not have his typical looser stools after taking  No known sick contacts   No recent abx, abnormal foods  Also reports substernal chest discomfort, started at same time as diarrhea, does not radiate  Denies fevers, chills, SOB, cough/URI sx, urinary complaints  SHx: R nephrectomy    MDM: 47 yo M with loose stools, benign abdominal exam- abdominal labs, reassess  CP- cardiac workup    ED Course         Critical Care Time  Procedures

## 2022-02-14 NOTE — DISCHARGE INSTRUCTIONS
Thank you for coming to the ER today  Please follow up with your primary care doctor in 1-2 days to be re-evaluated  If at any point you experience any new or worsening symptoms do not hesitate to come back to the hospital to be evaluated  Please follow up with your GI doctor for re-evaluation  Please consider following up with your cardiologist for re-evaluation  Thank you and hope you have a great rest of your day

## 2022-02-14 NOTE — ED PROVIDER NOTES
History  Chief Complaint   Patient presents with    Chest Pain     Woke up with left side chest pain and dizziness   Abdominal Pain     Woke up with generalized abdominal pain this morning  Denies n/v/d     Patient is a 79-year-old male with past medical history significant for renal cell carcinoma with right nephrectomy, hypertension, hyperlipidemia, irritable bowel syndrome presenting to the emergency department with 1 day of nonbloody diarrhea, and substernal chest pain  Patient states that he woke up this morning and had 3 episodes nonbloody loose stools  Patient endorses chest pain that is nonradiating, nonreproducible and it comes and goes  It is not associated with any diaphoresis, shortness of breath on exertion, numbness or tingling in any of his extremities  Patient states that he took a laxative 2 days ago but did not take any yesterday or today  Patient denies fevers, chills, shortness breath, diaphoresis, nausea, vomiting, hematuria, dysuria  Patient states he recently quit smoking  He denies any leg pains, unilateral leg swelling, hemoptysis  Prior to Admission Medications   Prescriptions Last Dose Informant Patient Reported? Taking?    LORazepam (ATIVAN) 0 5 mg tablet   Yes No   ascorbic acid (VITAMIN C) 500 MG tablet   Yes No   Sig: Take 500 mg by mouth daily   aspirin 81 mg chewable tablet   Yes No   Sig: Chew   atorvastatin (LIPITOR) 40 mg tablet   Yes No   Sig: Take 40 mg by mouth daily   butalbital-acetaminophen-caffeine (FIORICET,ESGIC) -40 mg per tablet   No No   Sig: Take 1 tablet by mouth every 6 (six) hours as needed for headaches for up to 10 doses   carvedilol (COREG) 25 mg tablet   No No   Sig: Take 1 tablet (25 mg total) by mouth 2 (two) times a day with meals   cyclobenzaprine (FLEXERIL) 10 mg tablet   Yes No   Sig: Take 10 mg by mouth 3 (three) times a day as needed   dicyclomine (BENTYL) 20 mg tablet   No No   Sig: Take 1 tablet (20 mg total) by mouth every 6 (six) hours   hyoscyamine (ANASPAZ,LEVSIN) 0 125 MG tablet   No No   Sig: Take 1 tablet (0 125 mg total) by mouth every 4 (four) hours as needed for cramping   levocetirizine (XYZAL) 5 MG tablet   Yes No   Sig: Take 5 mg by mouth every morning   linaCLOtide (Linzess) 290 MCG CAPS   Yes No   Sig: Take 290 mcg by mouth daily   losartan-hydrochlorothiazide (HYZAAR) 100-12 5 MG per tablet   No No   Sig: Take 1 tablet by mouth daily Resume on 4/14   nicotine (NICODERM CQ) 7 mg/24hr TD 24 hr patch   Yes No   Sig: Place 1 patch on the skin daily   nortriptyline (PAMELOR) 50 mg capsule   Yes No   Sig: Take 50 mg by mouth daily at bedtime   pantoprazole (PROTONIX) 20 mg tablet   No No   Sig: Take 1 tablet (20 mg total) by mouth daily   sildenafil (REVATIO) 20 mg tablet   Yes No   Sig: TAKE THREE TABLETS BY MOUTH ONE HOUR PRIOR AS DIRECTED   spironolactone (ALDACTONE) 25 mg tablet   No No   Sig: Take 2 tablets (50 mg total) by mouth daily Resume on 4/14   topiramate (TOPAMAX) 25 mg tablet   Yes No   Sig: topiramate 25 mg tablet   take 1 tablet by mouth three times a day   triamcinolone (KENALOG) 0 1 % cream   Yes No   Sig: apply twice a day to rash ON face for 2 weeks then STOP      Facility-Administered Medications: None       Past Medical History:   Diagnosis Date    Eosinophilic esophagitis     History of kidney cancer     2007    Hyperlipidemia     Hypertension     Renal disorder        Past Surgical History:   Procedure Laterality Date    COLONOSCOPY  08/04/2015    Normal by Dr Ramon Zuniga at [address] [place] [address]  12/16/2016    Diverticulosis otherwise normal by Desire Jones    COLONOSCOPY  05/14/2021    normal by Dr Israel Barlow Right     Renal cell carcinoma    UPPER GASTROINTESTINAL ENDOSCOPY  08/2020    Biopsy positive for eosinophilic esophagitis       Family History   Problem Relation Age of Onset    Lymphoma Mother      I have reviewed and agree with the history as documented  E-Cigarette/Vaping    E-Cigarette Use Never User      E-Cigarette/Vaping Substances     Social History     Tobacco Use    Smoking status: Former Smoker     Packs/day: 0 00    Smokeless tobacco: Never Used   Vaping Use    Vaping Use: Never used   Substance Use Topics    Alcohol use: Never    Drug use: Not Currently        Review of Systems   Constitutional: Negative for activity change, appetite change, chills, fatigue and fever  HENT: Negative for congestion, ear pain and sore throat  Eyes: Negative for pain and visual disturbance  Respiratory: Negative for cough, chest tightness, shortness of breath and wheezing  Cardiovascular: Positive for chest pain  Negative for palpitations  Gastrointestinal: Positive for diarrhea  Negative for abdominal distention, abdominal pain, blood in stool, constipation, nausea, rectal pain and vomiting  Genitourinary: Negative for dysuria and hematuria  Musculoskeletal: Negative for arthralgias and back pain  Skin: Negative for color change and rash  Neurological: Negative for dizziness, seizures, syncope, weakness, numbness and headaches  Psychiatric/Behavioral: Negative for agitation and behavioral problems  All other systems reviewed and are negative  Physical Exam  ED Triage Vitals [02/14/22 0848]   Temperature Pulse Respirations Blood Pressure SpO2   97 6 °F (36 4 °C) 95 18 152/96 98 %      Temp Source Heart Rate Source Patient Position - Orthostatic VS BP Location FiO2 (%)   Oral Monitor Sitting Right arm --      Pain Score       8             Orthostatic Vital Signs  Vitals:    02/14/22 0848 02/14/22 1015 02/14/22 1144   BP: 152/96 119/82 120/78   Pulse: 95 88 91   Patient Position - Orthostatic VS: Sitting Lying Lying       Physical Exam  Vitals and nursing note reviewed  Constitutional:       Appearance: He is well-developed and normal weight  HENT:      Head: Normocephalic and atraumatic     Eyes:      Extraocular Movements: Extraocular movements intact  Conjunctiva/sclera: Conjunctivae normal       Pupils: Pupils are equal, round, and reactive to light  Cardiovascular:      Rate and Rhythm: Normal rate and regular rhythm  Heart sounds: Normal heart sounds  No murmur heard  Pulmonary:      Effort: Pulmonary effort is normal  No respiratory distress  Breath sounds: Normal breath sounds  No decreased breath sounds, wheezing, rhonchi or rales  Chest:      Chest wall: No tenderness  Abdominal:      Palpations: Abdomen is soft  There is no mass  Tenderness: There is no abdominal tenderness  There is no rebound  Comments: No flank pain   Musculoskeletal:         General: Normal range of motion  Cervical back: Normal range of motion and neck supple  Right lower leg: No tenderness  No edema  Left lower leg: No tenderness  No edema  Skin:     General: Skin is warm and dry  Capillary Refill: Capillary refill takes less than 2 seconds  Neurological:      General: No focal deficit present  Mental Status: He is alert and oriented to person, place, and time     Psychiatric:         Mood and Affect: Mood normal          Behavior: Behavior normal          ED Medications  Medications - No data to display    Diagnostic Studies  Results Reviewed     Procedure Component Value Units Date/Time    HS Troponin 0hr (reflex protocol) [[id number]]  (Normal) Collected: 02/14/22 0958    Lab Status: Final result Specimen: Blood from Arm, Left Updated: 02/14/22 1030     hs TnI 0hr 2 ng/L     Comprehensive metabolic panel [[id number]]  (Abnormal) Collected: 02/14/22 0958    Lab Status: Final result Specimen: Blood from Arm, Left Updated: 02/14/22 1027     Sodium 135 mmol/L      Potassium 4 8 mmol/L      Chloride 102 mmol/L      CO2 29 mmol/L      ANION GAP 4 mmol/L      BUN 20 mg/dL      Creatinine 1 25 mg/dL      Glucose 118 mg/dL      Calcium 8 8 mg/dL      AST 12 U/L      ALT 19 U/L Alkaline Phosphatase 66 U/L      Total Protein 7 5 g/dL      Albumin 3 7 g/dL      Total Bilirubin 0 67 mg/dL      eGFR 66 ml/min/1 73sq m     Narrative:      [place] guidelines for Chronic Kidney Disease (CKD):     Stage 1 with normal or high GFR (GFR > 90 mL/min/1 73 square meters)    Stage 2 Mild CKD (GFR = 60-89 mL/min/1 73 square meters)    Stage 3A Moderate CKD (GFR = 45-59 mL/min/1 73 square meters)    Stage 3B Moderate CKD (GFR = 30-44 mL/min/1 73 square meters)    Stage 4 Severe CKD (GFR = 15-29 mL/min/1 73 square meters)    Stage 5 End Stage CKD (GFR <15 mL/min/1 73 square meters)  Note: GFR calculation is accurate only with a steady state creatinine    CBC and differential [[id number]]  (Abnormal) Collected: 02/14/22 0958    Lab Status: Final result Specimen: Blood from Arm, Left Updated: 02/14/22 1003     WBC 14 21 Thousand/uL      RBC 5 34 Million/uL      Hemoglobin 14 4 g/dL      Hematocrit 45 8 %      MCV 86 fL      MCH 27 0 pg      MCHC 31 4 g/dL      RDW 13 8 %      MPV 9 4 fL      Platelets 081 Thousands/uL      nRBC 0 /100 WBCs      Neutrophils Relative 86 %      Immat GRANS % 0 %      Lymphocytes Relative 6 %      Monocytes Relative 5 %      Eosinophils Relative 3 %      Basophils Relative 0 %      Neutrophils Absolute 12 07 Thousands/µL      Immature Grans Absolute 0 05 Thousand/uL      Lymphocytes Absolute 0 90 Thousands/µL      Monocytes Absolute 0 74 Thousand/µL      Eosinophils Absolute 0 42 Thousand/µL      Basophils Absolute 0 03 Thousands/µL                  XR chest 1 view portable   ED Interpretation by Mabel Ramires DO (02/14 1115)   No acute cardiopulmonary process noted  Procedures  ECG 12 Lead Documentation Only    Date/Time: 2/14/2022 9:36 AM  Performed by: Mabel Ramires DO  Authorized by:  Mabel Ramires DO     Indications / Diagnosis:  Cp  ECG reviewed by me, the ED Provider: no    Patient location: ED  Previous ECG:     Previous ECG:  Unavailable  Interpretation:     Interpretation: normal    Rate:     ECG rate:  86    ECG rate assessment: normal    Rhythm:     Rhythm: sinus rhythm    Ectopy:     Ectopy: none    QRS:     QRS axis:  Normal  Conduction:     Conduction: normal    ST segments:     ST segments:  Normal  T waves:     T waves: normal            ED Course  ED Course as of 02/14/22 1158   Mon Feb 14, 2022   0910 Blood Pressure: 152/96   0910 Temperature: 97 6 °F (36 4 °C)   0910 Temp Source: Oral   0910 Pulse: 95   0910 Respirations: 18   0910 SpO2: 98 %   0933 Will evaluate patient with CBC, CMP, troponin, EKG, chest x-ray to evaluate for any cardiac cause of his chest pain  Patient is denying any abdominal pain is more cramping when he is having episodes of diarrhea  Patient is aware he has no questions or concerns at this time will frequently re-evaluate  1006 WBC(!): 14 21   1031 hs TnI 0hr: 2   1032 Patient re-evaluted resting comfortable  Complains of cough hes worried that he could have pna  Will check cxr then d/c     1109 CP was over 3 hours ago  No need to delta  1120 Patient informed of all findings  Upon re-evaluation the patient states he is feeling okay  I advised him to follow-up with his GI doctor for re-evaluation in a few days  Advised him to continue to stay hydrated  Advised him to follow-up with cardiology if he continues to have chest pain  Advised him to follow-up with his primary care doctor in a few days for re-evaluation  Advised him to come back to the hospital with any new, worsening or concerning symptoms  Patient has no questions or concerns at this time stable for discharge               HEART Risk Score      Most Recent Value   Heart Score Risk Calculator    History 0 Filed at: 02/14/2022 1110   ECG 1 Filed at: 02/14/2022 1110   Age 1 Filed at: 02/14/2022 1110   Risk Factors 2 Filed at: 02/14/2022 1110   Troponin 0 Filed at: 02/14/2022 1110   HEART Score 4 Filed at: 02/14/2022 1110                      SBIRT 22yo+      Most Recent Value   SBIRT (23 yo +)    In order to provide better care to our patients, we are screening all of our patients for alcohol and drug use  Would it be okay to ask you these screening questions? No Filed at: 02/14/2022 0945                MDM    Disposition  Final diagnoses:   Chest pain   Diarrhea   Cough     Time reflects when diagnosis was documented in both MDM as applicable and the Disposition within this note     Time User Action Codes Description Comment    2/14/2022 11:11 AM Charlotte Faster Add [R07 9] Chest pain     2/14/2022 11:11 AM Charlotte Faster Add [R19 7] Diarrhea     2/14/2022 11:11 AM Charlotte Faster Add [R05 9] Cough       ED Disposition     ED Disposition Condition Date/Time Comment    Discharge Stable Mon Feb 14, 2022 11:15 AM Jayjay White discharge to home/self care              Follow-up Information     Follow up With Specialties Details Why Contact Info Additional Information    Rhett Perez MD Family Medicine Schedule an appointment as soon as possible for a visit  for follow up [address] [address]       [facility] Emergency Department Emergency Medicine Go to  As needed, If symptoms worsen [facility] [id number] 0[facility] Emergency Department, [address] [place] [address], [address] [facility] Gastroenterology SPECIALISTS [facility] Gastroenterology Schedule an appointment as soon as possible for a visit  for follow up [address] [facility] [address] [address] [phone] Hugh Zhu [facility] [place] [address], [address], [place], [place], [address]    [address] [facility] Cardiology Schedule an appointment as soon as possible for a visit  for follow up [address] [facility] [address] [facility] [address], [address] [place], [place], [place] [address]          Discharge Medication List as of 2/14/2022 11:15 AM      CONTINUE these medications which have NOT CHANGED    Details   ascorbic acid (VITAMIN C) 500 MG tablet Take 500 mg by mouth daily, Historical Med      aspirin 81 mg chewable tablet Chew, Historical Med      atorvastatin (LIPITOR) 40 mg tablet Take 40 mg by mouth daily, Historical Med      butalbital-acetaminophen-caffeine (FIORICET,ESGIC) -40 mg per tablet Take 1 tablet by mouth every 6 (six) hours as needed for headaches for up to 10 doses, Starting Fri 9/17/2021, Normal      carvedilol (COREG) 25 mg tablet Take 1 tablet (25 mg total) by mouth 2 (two) times a day with meals, Starting Mon 4/12/2021, Until Tue 4/12/2022, Normal      cyclobenzaprine (FLEXERIL) 10 mg tablet Take 10 mg by mouth 3 (three) times a day as needed, Historical Med      dicyclomine (BENTYL) 20 mg tablet Take 1 tablet (20 mg total) by mouth every 6 (six) hours, Starting Mon 8/23/2021, Until Wed 9/22/2021, Normal      hyoscyamine (ANASPAZ,LEVSIN) 0 125 MG tablet Take 1 tablet (0 125 mg total) by mouth every 4 (four) hours as needed for cramping, Starting Mon 8/16/2021, Normal      levocetirizine (XYZAL) 5 MG tablet Take 5 mg by mouth every morning, Starting Wed 7/29/2020, Historical Med      linaCLOtide (Linzess) 290 MCG CAPS Take 290 mcg by mouth daily, Historical Med      LORazepam (ATIVAN) 0 5 mg tablet Starting Tue 8/25/2020, Historical Med      losartan-hydrochlorothiazide (HYZAAR) 100-12 5 MG per tablet Take 1 tablet by mouth daily Resume on 4/14, Starting Mon 4/12/2021, No Print      nicotine (NICODERM CQ) 7 mg/24hr TD 24 hr patch Place 1 patch on the skin daily, Starting Wed 5/26/2021, Historical Med      nortriptyline (PAMELOR) 50 mg capsule Take 50 mg by mouth daily at bedtime, Starting Mon 7/12/2021, Historical Med pantoprazole (PROTONIX) 20 mg tablet Take 1 tablet (20 mg total) by mouth daily, Starting Thu 5/27/2021, Until Sat 6/26/2021, Normal      sildenafil (REVATIO) 20 mg tablet TAKE THREE TABLETS BY MOUTH ONE HOUR PRIOR AS DIRECTED, Historical Med      spironolactone (ALDACTONE) 25 mg tablet Take 2 tablets (50 mg total) by mouth daily Resume on 4/14, Starting Mon 4/12/2021, No Print      topiramate (TOPAMAX) 25 mg tablet topiramate 25 mg tablet   take 1 tablet by mouth three times a day, Historical Med      triamcinolone (KENALOG) 0 1 % cream apply twice a day to rash ON face for 2 weeks then STOP, Historical Med           No discharge procedures on file  PDMP Review     None           ED Provider  Attending physically available and evaluated Kimberley Horvath I managed the patient along with the ED Attending      Electronically Signed by         Freya Barroso DO  02/14/22 5496

## 2022-02-14 NOTE — Clinical Note
Jonnie Rodriguez was seen and treated in our emergency department on 2/14/2022  Diagnosis:     Briana Braun  is off the rest of the shift today  He may return on this date: If you have any questions or concerns, please don't hesitate to call        Jeovanny Sanders, DO    ______________________________           _______________          _______________  Hospital Representative                              Date                                Time

## 2022-03-20 ENCOUNTER — APPOINTMENT (EMERGENCY)
Dept: CT IMAGING | Facility: HOSPITAL | Age: 52
End: 2022-03-20
Payer: COMMERCIAL

## 2022-03-20 ENCOUNTER — APPOINTMENT (EMERGENCY)
Dept: RADIOLOGY | Facility: HOSPITAL | Age: 52
End: 2022-03-20
Payer: COMMERCIAL

## 2022-03-20 ENCOUNTER — HOSPITAL ENCOUNTER (EMERGENCY)
Facility: HOSPITAL | Age: 52
Discharge: HOME | End: 2022-03-20
Attending: EMERGENCY MEDICINE | Admitting: EMERGENCY MEDICINE
Payer: COMMERCIAL

## 2022-03-20 VITALS
HEART RATE: 77 BPM | BODY MASS INDEX: 46.58 KG/M2 | WEIGHT: 310.85 LBS | SYSTOLIC BLOOD PRESSURE: 134 MMHG | RESPIRATION RATE: 18 BRPM | DIASTOLIC BLOOD PRESSURE: 75 MMHG | TEMPERATURE: 98.4 F | OXYGEN SATURATION: 99 %

## 2022-03-20 DIAGNOSIS — R93.89 ABNORMAL CT SCAN: ICD-10-CM

## 2022-03-20 DIAGNOSIS — N28.1 RENAL CYST, LEFT: ICD-10-CM

## 2022-03-20 DIAGNOSIS — R10.9 ACUTE LEFT FLANK PAIN: Primary | ICD-10-CM

## 2022-03-20 DIAGNOSIS — M62.838 TRAPEZIUS MUSCLE SPASM: ICD-10-CM

## 2022-03-20 LAB
ALBUMIN SERPL BCP-MCNC: 3.8 G/DL (ref 3.5–5)
ALP SERPL-CCNC: 79 U/L (ref 46–116)
ALT SERPL W P-5'-P-CCNC: 24 U/L (ref 12–78)
ANION GAP SERPL CALCULATED.3IONS-SCNC: 8 MMOL/L (ref 4–13)
AST SERPL W P-5'-P-CCNC: 9 U/L (ref 5–45)
BASOPHILS # BLD AUTO: 0.08 THOUSANDS/ΜL (ref 0–0.1)
BASOPHILS NFR BLD AUTO: 1 % (ref 0–1)
BILIRUB SERPL-MCNC: 1.07 MG/DL (ref 0.2–1)
BUN SERPL-MCNC: 27 MG/DL (ref 5–25)
CALCIUM SERPL-MCNC: 9.3 MG/DL (ref 8.3–10.1)
CHLORIDE SERPL-SCNC: 101 MMOL/L (ref 100–108)
CO2 SERPL-SCNC: 28 MMOL/L (ref 21–32)
CREAT SERPL-MCNC: 1.5 MG/DL (ref 0.6–1.3)
EOSINOPHIL # BLD AUTO: 0.3 THOUSAND/ΜL (ref 0–0.61)
EOSINOPHIL NFR BLD AUTO: 2 % (ref 0–6)
ERYTHROCYTE [DISTWIDTH] IN BLOOD BY AUTOMATED COUNT: 13.7 % (ref 11.6–15.1)
GFR SERPL CREATININE-BSD FRML MDRD: 53 ML/MIN/1.73SQ M
GLUCOSE SERPL-MCNC: 100 MG/DL (ref 65–140)
HCT VFR BLD AUTO: 43.5 % (ref 36.5–49.3)
HGB BLD-MCNC: 14.1 G/DL (ref 12–17)
IMM GRANULOCYTES # BLD AUTO: 0.08 THOUSAND/UL (ref 0–0.2)
IMM GRANULOCYTES NFR BLD AUTO: 1 % (ref 0–2)
LIPASE SERPL-CCNC: 126 U/L (ref 73–393)
LYMPHOCYTES # BLD AUTO: 2.61 THOUSANDS/ΜL (ref 0.6–4.47)
LYMPHOCYTES NFR BLD AUTO: 17 % (ref 14–44)
MCH RBC QN AUTO: 26.8 PG (ref 26.8–34.3)
MCHC RBC AUTO-ENTMCNC: 32.4 G/DL (ref 31.4–37.4)
MCV RBC AUTO: 83 FL (ref 82–98)
MONOCYTES # BLD AUTO: 1.09 THOUSAND/ΜL (ref 0.17–1.22)
MONOCYTES NFR BLD AUTO: 7 % (ref 4–12)
NEUTROPHILS # BLD AUTO: 11.42 THOUSANDS/ΜL (ref 1.85–7.62)
NEUTS SEG NFR BLD AUTO: 72 % (ref 43–75)
NRBC BLD AUTO-RTO: 0 /100 WBCS
PLATELET # BLD AUTO: 326 THOUSANDS/UL (ref 149–390)
PMV BLD AUTO: 9.2 FL (ref 8.9–12.7)
POTASSIUM SERPL-SCNC: 4.7 MMOL/L (ref 3.5–5.3)
PROT SERPL-MCNC: 8.2 G/DL (ref 6.4–8.2)
RBC # BLD AUTO: 5.26 MILLION/UL (ref 3.88–5.62)
SODIUM SERPL-SCNC: 137 MMOL/L (ref 136–145)
WBC # BLD AUTO: 15.58 THOUSAND/UL (ref 4.31–10.16)

## 2022-03-20 PROCEDURE — 99285 EMERGENCY DEPT VISIT HI MDM: CPT | Performed by: EMERGENCY MEDICINE

## 2022-03-20 PROCEDURE — 99284 EMERGENCY DEPT VISIT MOD MDM: CPT

## 2022-03-20 PROCEDURE — 74177 CT ABD & PELVIS W/CONTRAST: CPT

## 2022-03-20 PROCEDURE — 96361 HYDRATE IV INFUSION ADD-ON: CPT

## 2022-03-20 PROCEDURE — 73030 X-RAY EXAM OF SHOULDER: CPT

## 2022-03-20 PROCEDURE — 83690 ASSAY OF LIPASE: CPT | Performed by: EMERGENCY MEDICINE

## 2022-03-20 PROCEDURE — 85025 COMPLETE CBC W/AUTO DIFF WBC: CPT | Performed by: EMERGENCY MEDICINE

## 2022-03-20 PROCEDURE — 36415 COLL VENOUS BLD VENIPUNCTURE: CPT | Performed by: EMERGENCY MEDICINE

## 2022-03-20 PROCEDURE — 80053 COMPREHEN METABOLIC PANEL: CPT | Performed by: EMERGENCY MEDICINE

## 2022-03-20 PROCEDURE — 96374 THER/PROPH/DIAG INJ IV PUSH: CPT

## 2022-03-20 RX ORDER — KETOROLAC TROMETHAMINE 30 MG/ML
15 INJECTION, SOLUTION INTRAMUSCULAR; INTRAVENOUS ONCE
Status: COMPLETED | OUTPATIENT
Start: 2022-03-20 | End: 2022-03-20

## 2022-03-20 RX ADMIN — KETOROLAC TROMETHAMINE 15 MG: 30 INJECTION, SOLUTION INTRAMUSCULAR at 16:55

## 2022-03-20 RX ADMIN — IOHEXOL 100 ML: 350 INJECTION, SOLUTION INTRAVENOUS at 17:34

## 2022-03-20 RX ADMIN — SODIUM CHLORIDE 1000 ML: 0.9 INJECTION, SOLUTION INTRAVENOUS at 16:53

## 2022-03-20 NOTE — ED PROVIDER NOTES
History  Chief Complaint   Patient presents with    Muscle Pain     patient reports right clavicle area pain that started a couple of days ago and radiates into the shoulder area  Patient also has LLQ abdominal pain with a hx  of IBS and decrease in BM output  Pt  reports overall general weakness  46 y o  M w/h/o HTN p/w right trap pain x today and LLQ pain x yesterday  Right trap pain is constant, nonradiating  Denies neuro deficits, trauma  Has full ROM of right shoulder and neck  LLQ pain is constant, nonradiating  Associated with constipation  Last BM yesterday but states it was a little bit  Denies F/C, N/V  Reports feeling tired today because he stayed up late playing video games  History provided by:  Patient   used: No    Abdominal Pain  Pain location:  LLQ  Pain radiates to:  Does not radiate  Duration:  1 day  Timing:  Constant  Progression:  Unchanged  Chronicity:  New  Relieved by:  Nothing  Worsened by:  Nothing  Ineffective treatments:  Acetaminophen  Associated symptoms: constipation    Associated symptoms: no diarrhea, no fever, no nausea and no vomiting        Prior to Admission Medications   Prescriptions Last Dose Informant Patient Reported? Taking?    LORazepam (ATIVAN) 0 5 mg tablet   Yes No   ascorbic acid (VITAMIN C) 500 MG tablet   Yes No   Sig: Take 500 mg by mouth daily   aspirin 81 mg chewable tablet   Yes No   Sig: Chew   atorvastatin (LIPITOR) 40 mg tablet   Yes No   Sig: Take 40 mg by mouth daily   butalbital-acetaminophen-caffeine (FIORICET,ESGIC) -40 mg per tablet   No No   Sig: Take 1 tablet by mouth every 6 (six) hours as needed for headaches for up to 10 doses   carvedilol (COREG) 25 mg tablet   No No   Sig: Take 1 tablet (25 mg total) by mouth 2 (two) times a day with meals   cyclobenzaprine (FLEXERIL) 10 mg tablet   Yes No   Sig: Take 10 mg by mouth 3 (three) times a day as needed   dicyclomine (BENTYL) 20 mg tablet   No No   Sig: Take 1 tablet (20 mg total) by mouth every 6 (six) hours   hyoscyamine (ANASPAZ,LEVSIN) 0 125 MG tablet   No No   Sig: Take 1 tablet (0 125 mg total) by mouth every 4 (four) hours as needed for cramping   levocetirizine (XYZAL) 5 MG tablet   Yes No   Sig: Take 5 mg by mouth every morning   linaCLOtide (Linzess) 290 MCG CAPS   Yes No   Sig: Take 290 mcg by mouth daily   losartan-hydrochlorothiazide (HYZAAR) 100-12 5 MG per tablet   No No   Sig: Take 1 tablet by mouth daily Resume on 4/14   nicotine (NICODERM CQ) 7 mg/24hr TD 24 hr patch   Yes No   Sig: Place 1 patch on the skin daily   Patient not taking: Reported on 2/18/2022    nortriptyline (PAMELOR) 50 mg capsule   Yes No   Sig: Take 50 mg by mouth daily at bedtime   pantoprazole (PROTONIX) 20 mg tablet   No No   Sig: Take 1 tablet (20 mg total) by mouth daily   sildenafil (REVATIO) 20 mg tablet   Yes No   Sig: TAKE THREE TABLETS BY MOUTH ONE HOUR PRIOR AS DIRECTED   Patient not taking: Reported on 2/18/2022   spironolactone (ALDACTONE) 25 mg tablet   No No   Sig: Take 2 tablets (50 mg total) by mouth daily Resume on 4/14   topiramate (TOPAMAX) 25 mg tablet   Yes No   Sig: topiramate 25 mg tablet   take 1 tablet by mouth three times a day   triamcinolone (KENALOG) 0 1 % cream   Yes No   Sig: apply twice a day to rash ON face for 2 weeks then STOP      Facility-Administered Medications: None       Past Medical History:   Diagnosis Date    Eosinophilic esophagitis     History of kidney cancer     2007    Hyperlipidemia     Hypertension     Renal disorder        Past Surgical History:   Procedure Laterality Date    COLONOSCOPY  08/04/2015    Normal by Dr Deion Carcamo at [facility]  12/16/2016    Diverticulosis otherwise normal by Dr Deion Carcamo, [facility] COLONOSCOPY  05/14/2021    normal by Dr Fahad Gaines Right     Renal cell carcinoma    UPPER GASTROINTESTINAL ENDOSCOPY  08/2020    Biopsy positive for eosinophilic esophagitis       Family History   Problem Relation Age of Onset    Lymphoma Mother      I have reviewed and agree with the history as documented  E-Cigarette/Vaping    E-Cigarette Use Never User      E-Cigarette/Vaping Substances     Social History     Tobacco Use    Smoking status: Former Smoker     Packs/day: 0 00    Smokeless tobacco: Never Used   Vaping Use    Vaping Use: Never used   Substance Use Topics    Alcohol use: Never    Drug use: Not Currently       Review of Systems   Constitutional: Negative for fever  Gastrointestinal: Positive for abdominal pain and constipation  Negative for diarrhea, nausea and vomiting  Neurological: Negative for weakness, numbness and headaches  All other systems reviewed and are negative  Physical Exam  Physical Exam  Vitals and nursing note reviewed  Constitutional:       General: He is not in acute distress  Appearance: Normal appearance  He is well-developed  He is not ill-appearing, toxic-appearing or diaphoretic  HENT:      Head: Normocephalic and atraumatic  Eyes:      General: No scleral icterus  Neck:      Vascular: No JVD  Trachea: Trachea normal    Cardiovascular:      Rate and Rhythm: Normal rate and regular rhythm  Heart sounds: Normal heart sounds  No murmur heard  No friction rub  Pulmonary:      Effort: Pulmonary effort is normal  No accessory muscle usage or respiratory distress  Breath sounds: Normal breath sounds  No stridor  No wheezing, rhonchi or rales  Abdominal:      General: There is no distension  Palpations: Abdomen is soft  Abdomen is not rigid  There is no mass  Tenderness: There is abdominal tenderness in the left lower quadrant  There is no right CVA tenderness, left CVA tenderness, guarding or rebound  Negative signs include Rodney's sign and McBurney's sign     Musculoskeletal:      Right shoulder: Normal       Right elbow: Normal       Right wrist: Normal       Cervical back: Normal and normal range of motion  Thoracic back: Normal       Lumbar back: Normal    Skin:     General: Skin is warm and dry  Coloration: Skin is not pale  Findings: No rash  Neurological:      Mental Status: He is alert  GCS: GCS eye subscore is 4  GCS verbal subscore is 5  GCS motor subscore is 6     Psychiatric:         Behavior: Behavior normal          Vital Signs  ED Triage Vitals [03/20/22 1558]   Temperature Pulse Respirations Blood Pressure SpO2   98 4 °F (36 9 °C) 77 18 134/75 99 %      Temp Source Heart Rate Source Patient Position - Orthostatic VS BP Location FiO2 (%)   Oral Monitor Sitting Right arm --      Pain Score       10 - Worst Possible Pain           Vitals:    03/20/22 1558   BP: 134/75   Pulse: 77   Patient Position - Orthostatic VS: Sitting         Visual Acuity      ED Medications  Medications   sodium chloride 0 9 % bolus 1,000 mL (0 mL Intravenous Stopped 3/20/22 1842)   ketorolac (TORADOL) injection 15 mg (15 mg Intravenous Given 3/20/22 1655)   iohexol (OMNIPAQUE) 350 MG/ML injection (SINGLE-DOSE) 100 mL (100 mL Intravenous Given 3/20/22 1734)       Diagnostic Studies  Results Reviewed     Procedure Component Value Units Date/Time    Comprehensive metabolic panel [[id number]]  (Abnormal) Collected: 03/20/22 1656    Lab Status: Final result Specimen: Blood from Arm, Left Updated: 03/20/22 1716     Sodium 137 mmol/L      Potassium 4 7 mmol/L      Chloride 101 mmol/L      CO2 28 mmol/L      ANION GAP 8 mmol/L      BUN 27 mg/dL      Creatinine 1 50 mg/dL      Glucose 100 mg/dL      Calcium 9 3 mg/dL      AST 9 U/L      ALT 24 U/L      Alkaline Phosphatase 79 U/L      Total Protein 8 2 g/dL      Albumin 3 8 g/dL      Total Bilirubin 1 07 mg/dL      eGFR 53 ml/min/1 73sq m     Narrative:      [place] guidelines for Chronic Kidney Disease (CKD):     Stage 1 with normal or high GFR (GFR > 90 mL/min/1 73 square meters)    Stage 2 Mild CKD (GFR = 60-89 mL/min/1 73 square meters)    Stage 3A Moderate CKD (GFR = 45-59 mL/min/1 73 square meters)    Stage 3B Moderate CKD (GFR = 30-44 mL/min/1 73 square meters)    Stage 4 Severe CKD (GFR = 15-29 mL/min/1 73 square meters)    Stage 5 End Stage CKD (GFR <15 mL/min/1 73 square meters)  Note: GFR calculation is accurate only with a steady state creatinine    Lipase [[id number]]  (Normal) Collected: 03/20/22 1656    Lab Status: Final result Specimen: Blood from Arm, Left Updated: 03/20/22 1716     Lipase 126 u/L     CBC and differential [[id number]]  (Abnormal) Collected: 03/20/22 1656    Lab Status: Final result Specimen: Blood from Arm, Left Updated: 03/20/22 1701     WBC 15 58 Thousand/uL      RBC 5 26 Million/uL      Hemoglobin 14 1 g/dL      Hematocrit 43 5 %      MCV 83 fL      MCH 26 8 pg      MCHC 32 4 g/dL      RDW 13 7 %      MPV 9 2 fL      Platelets 011 Thousands/uL      nRBC 0 /100 WBCs      Neutrophils Relative 72 %      Immat GRANS % 1 %      Lymphocytes Relative 17 %      Monocytes Relative 7 %      Eosinophils Relative 2 %      Basophils Relative 1 %      Neutrophils Absolute 11 42 Thousands/µL      Immature Grans Absolute 0 08 Thousand/uL      Lymphocytes Absolute 2 61 Thousands/µL      Monocytes Absolute 1 09 Thousand/µL      Eosinophils Absolute 0 30 Thousand/µL      Basophils Absolute 0 08 Thousands/µL                  XR shoulder 2+ views RIGHT   ED Interpretation by DO Ursula (03/20 1755)   No fracture      CT abdomen pelvis with contrast   Final Result by Shahida Grey MD (03/20 1815)      No evidence of stone or hydronephrosis  Numerous left renal cysts  2 demonstrates some increased density in an upper pole lesion appears new or enlarged since the study of 2021  Nonemergent follow-up with renal CT protocol is advised as well as urologic consultation        This was discussed with Dr Rupinder Iniguez at [facility] ([facility]) PM      Workstation performed: [id number] Procedures  Procedures         ED Course  ED Course as of 03/20/22 1847   Laly Sample Mar 20, 2022   1825 Updated pt on results including enlarging renal cysts and radiology recommends f/u with urology (which pt states he already follows with and will call tomorrow) for renal protocol CT                                              MDM    Disposition  Final diagnoses:   Acute left flank pain   Trapezius muscle spasm - Right trap pain   Renal cyst, left   Abnormal CT scan     Time reflects when diagnosis was documented in both MDM as applicable and the Disposition within this note     Time User Action Codes Description Comment    3/20/2022  6:15 PM Karen Mayers Add [R10 9] Acute left flank pain     3/20/2022  6:16 PM Melinda Mayers Add [place] Trapezius muscle spasm     3/20/2022  6:16 PM Melinda Mayers Modify [O00 906] Trapezius muscle spasm Right trap pain    3/20/2022  6:18 PM Talib Crystal L Add [N28 1] Renal cyst, left     3/20/2022  6:18 PM Talib Crystal L Add [R93 89] Abnormal CT scan       ED Disposition     ED Disposition Condition Date/Time Comment    Discharge Stable Sun Mar 20, 2022  6:26 PM Liane Peers discharge to home/self care              Follow-up Information     Follow up With Specialties Details Why Contact Info    Your urologist  Call in 1 day Call to make an appt tomorrow so they may order the recommended CT scan to assess the enlarging kidney cysts seen on today's CT scan     Mckay Mills MD Family Medicine Schedule an appointment as soon as possible for a visit  For follow up of trap pain if it persists [address] [id number]            Discharge Medication List as of 3/20/2022  6:26 PM      CONTINUE these medications which have NOT CHANGED    Details   ascorbic acid (VITAMIN C) 500 MG tablet Take 500 mg by mouth daily, Historical Med      aspirin 81 mg chewable tablet Chew, Historical Med      atorvastatin (LIPITOR) 40 mg tablet Take 40 mg by mouth daily, Historical Med      butalbital-acetaminophen-caffeine (FIORICET,ESGIC) -40 mg per tablet Take 1 tablet by mouth every 6 (six) hours as needed for headaches for up to 10 doses, Starting Fri 9/17/2021, Normal      carvedilol (COREG) 25 mg tablet Take 1 tablet (25 mg total) by mouth 2 (two) times a day with meals, Starting Mon 4/12/2021, Until Tue 4/12/2022, Normal      cyclobenzaprine (FLEXERIL) 10 mg tablet Take 10 mg by mouth 3 (three) times a day as needed, Historical Med      dicyclomine (BENTYL) 20 mg tablet Take 1 tablet (20 mg total) by mouth every 6 (six) hours, Starting Mon 8/23/2021, Until Fri 2/18/2022, Normal      hyoscyamine (ANASPAZ,LEVSIN) 0 125 MG tablet Take 1 tablet (0 125 mg total) by mouth every 4 (four) hours as needed for cramping, Starting Mon 8/16/2021, Normal      levocetirizine (XYZAL) 5 MG tablet Take 5 mg by mouth every morning, Starting Wed 7/29/2020, Historical Med      linaCLOtide (Linzess) 290 MCG CAPS Take 290 mcg by mouth daily, Historical Med      LORazepam (ATIVAN) 0 5 mg tablet Starting Tue 8/25/2020, Historical Med      losartan-hydrochlorothiazide (HYZAAR) 100-12 5 MG per tablet Take 1 tablet by mouth daily Resume on 4/14, Starting Mon 4/12/2021, No Print      nicotine (NICODERM CQ) 7 mg/24hr TD 24 hr patch Place 1 patch on the skin daily, Starting Wed 5/26/2021, Historical Med      nortriptyline (PAMELOR) 50 mg capsule Take 50 mg by mouth daily at bedtime, Starting Mon 7/12/2021, Historical Med      pantoprazole (PROTONIX) 20 mg tablet Take 1 tablet (20 mg total) by mouth daily, Starting Thu 5/27/2021, Until Fri 2/18/2022, Normal      sildenafil (REVATIO) 20 mg tablet TAKE THREE TABLETS BY MOUTH ONE HOUR PRIOR AS DIRECTED, Historical Med      spironolactone (ALDACTONE) 25 mg tablet Take 2 tablets (50 mg total) by mouth daily Resume on 4/14, Starting Mon 4/12/2021, No Print      topiramate (TOPAMAX) 25 mg tablet topiramate 25 mg tablet   take 1 tablet by mouth three times a day, Historical Med      triamcinolone (KENALOG) 0 1 % cream apply twice a day to rash ON face for 2 weeks then STOP, Historical Med             No discharge procedures on file      PDMP Review     None          ED Provider  Electronically Signed by           Jacob Mccain 24, DO 03/20/22 0971

## 2022-04-23 ENCOUNTER — APPOINTMENT (EMERGENCY)
Dept: CT IMAGING | Facility: HOSPITAL | Age: 52
End: 2022-04-23
Payer: COMMERCIAL

## 2022-04-23 ENCOUNTER — APPOINTMENT (EMERGENCY)
Dept: RADIOLOGY | Facility: HOSPITAL | Age: 52
End: 2022-04-23
Payer: COMMERCIAL

## 2022-04-23 ENCOUNTER — HOSPITAL ENCOUNTER (EMERGENCY)
Facility: HOSPITAL | Age: 52
Discharge: HOME | End: 2022-04-23
Attending: EMERGENCY MEDICINE | Admitting: EMERGENCY MEDICINE
Payer: COMMERCIAL

## 2022-04-23 VITALS
OXYGEN SATURATION: 99 % | TEMPERATURE: 98.5 F | BODY MASS INDEX: 45.68 KG/M2 | DIASTOLIC BLOOD PRESSURE: 74 MMHG | SYSTOLIC BLOOD PRESSURE: 128 MMHG | HEIGHT: 69 IN | WEIGHT: 308.4 LBS | RESPIRATION RATE: 18 BRPM | HEART RATE: 72 BPM

## 2022-04-23 DIAGNOSIS — M54.2 NECK PAIN: Primary | ICD-10-CM

## 2022-04-23 DIAGNOSIS — K59.00 CONSTIPATION: ICD-10-CM

## 2022-04-23 DIAGNOSIS — I88.9 LYMPHADENITIS: ICD-10-CM

## 2022-04-23 LAB
ALBUMIN SERPL BCP-MCNC: 4.6 G/DL (ref 3–5.2)
ALP SERPL-CCNC: 69 U/L (ref 43–122)
ALT SERPL W P-5'-P-CCNC: 25 U/L
ANION GAP SERPL CALCULATED.3IONS-SCNC: 9 MMOL/L (ref 5–14)
AST SERPL W P-5'-P-CCNC: 17 U/L (ref 17–59)
ATRIAL RATE: 74 BPM
BACTERIA UR QL AUTO: NORMAL /HPF
BASOPHILS # BLD AUTO: 0.03 THOUSANDS/ΜL (ref 0–0.1)
BASOPHILS NFR BLD AUTO: 0 % (ref 0–1)
BILIRUB SERPL-MCNC: 0.91 MG/DL
BILIRUB UR QL STRIP: NEGATIVE
BUN SERPL-MCNC: 19 MG/DL (ref 5–25)
CALCIUM SERPL-MCNC: 9.8 MG/DL (ref 8.4–10.2)
CHLORIDE SERPL-SCNC: 98 MMOL/L (ref 97–108)
CLARITY UR: CLEAR
CO2 SERPL-SCNC: 32 MMOL/L (ref 22–30)
COLOR UR: ABNORMAL
CREAT SERPL-MCNC: 1.38 MG/DL (ref 0.7–1.5)
EOSINOPHIL # BLD AUTO: 0.33 THOUSAND/ΜL (ref 0–0.61)
EOSINOPHIL NFR BLD AUTO: 2 % (ref 0–6)
ERYTHROCYTE [DISTWIDTH] IN BLOOD BY AUTOMATED COUNT: 13.4 % (ref 11.6–15.1)
GFR SERPL CREATININE-BSD FRML MDRD: 58 ML/MIN/1.73SQ M
GLUCOSE SERPL-MCNC: 122 MG/DL (ref 70–99)
GLUCOSE UR STRIP-MCNC: NEGATIVE MG/DL
HCT VFR BLD AUTO: 45.7 % (ref 36.5–49.3)
HGB BLD-MCNC: 13.9 G/DL (ref 12–17)
HGB UR QL STRIP.AUTO: NEGATIVE
IMM GRANULOCYTES # BLD AUTO: 0.08 THOUSAND/UL (ref 0–0.2)
IMM GRANULOCYTES NFR BLD AUTO: 1 % (ref 0–2)
KETONES UR STRIP-MCNC: NEGATIVE MG/DL
LEUKOCYTE ESTERASE UR QL STRIP: 25
LYMPHOCYTES # BLD AUTO: 2.38 THOUSANDS/ΜL (ref 0.6–4.47)
LYMPHOCYTES NFR BLD AUTO: 17 % (ref 14–44)
MAGNESIUM SERPL-MCNC: 1.6 MG/DL (ref 1.6–2.3)
MCH RBC QN AUTO: 25.8 PG (ref 26.8–34.3)
MCHC RBC AUTO-ENTMCNC: 30.4 G/DL (ref 31.4–37.4)
MCV RBC AUTO: 85 FL (ref 82–98)
MONOCYTES # BLD AUTO: 1.1 THOUSAND/ΜL (ref 0.17–1.22)
MONOCYTES NFR BLD AUTO: 8 % (ref 4–12)
NEUTROPHILS # BLD AUTO: 9.74 THOUSANDS/ΜL (ref 1.85–7.62)
NEUTS SEG NFR BLD AUTO: 72 % (ref 43–75)
NITRITE UR QL STRIP: NEGATIVE
NON-SQ EPI CELLS URNS QL MICRO: NORMAL /HPF
NRBC BLD AUTO-RTO: 0 /100 WBCS
P AXIS: 61 DEGREES
PH UR STRIP.AUTO: 6 [PH]
PLATELET # BLD AUTO: 337 THOUSANDS/UL (ref 149–390)
PMV BLD AUTO: 9 FL (ref 8.9–12.7)
POTASSIUM SERPL-SCNC: 4.3 MMOL/L (ref 3.6–5)
PR INTERVAL: 188 MS
PROT SERPL-MCNC: 8.6 G/DL (ref 5.9–8.4)
PROT UR STRIP-MCNC: ABNORMAL MG/DL
QRS AXIS: -5 DEGREES
QRSD INTERVAL: 88 MS
QT INTERVAL: 382 MS
QTC INTERVAL: 424 MS
RBC # BLD AUTO: 5.39 MILLION/UL (ref 3.88–5.62)
RBC #/AREA URNS AUTO: NORMAL /HPF
S PYO DNA THROAT QL NAA+PROBE: NOT DETECTED
SODIUM SERPL-SCNC: 139 MMOL/L (ref 137–147)
SP GR UR STRIP.AUTO: 1.01 (ref 1–1.04)
T WAVE AXIS: 57 DEGREES
UROBILINOGEN UA: NEGATIVE MG/DL
VENTRICULAR RATE: 74 BPM
WBC # BLD AUTO: 13.66 THOUSAND/UL (ref 4.31–10.16)
WBC #/AREA URNS AUTO: NORMAL /HPF

## 2022-04-23 PROCEDURE — 80053 COMPREHEN METABOLIC PANEL: CPT | Performed by: EMERGENCY MEDICINE

## 2022-04-23 PROCEDURE — 70490 CT SOFT TISSUE NECK W/O DYE: CPT

## 2022-04-23 PROCEDURE — 93005 ELECTROCARDIOGRAM TRACING: CPT

## 2022-04-23 PROCEDURE — 83735 ASSAY OF MAGNESIUM: CPT | Performed by: EMERGENCY MEDICINE

## 2022-04-23 PROCEDURE — 81001 URINALYSIS AUTO W/SCOPE: CPT | Performed by: EMERGENCY MEDICINE

## 2022-04-23 PROCEDURE — 99284 EMERGENCY DEPT VISIT MOD MDM: CPT

## 2022-04-23 PROCEDURE — 87651 STREP A DNA AMP PROBE: CPT | Performed by: EMERGENCY MEDICINE

## 2022-04-23 PROCEDURE — 85025 COMPLETE CBC W/AUTO DIFF WBC: CPT | Performed by: EMERGENCY MEDICINE

## 2022-04-23 PROCEDURE — 36415 COLL VENOUS BLD VENIPUNCTURE: CPT | Performed by: EMERGENCY MEDICINE

## 2022-04-23 PROCEDURE — 93010 ELECTROCARDIOGRAM REPORT: CPT | Performed by: INTERNAL MEDICINE

## 2022-04-23 PROCEDURE — 99285 EMERGENCY DEPT VISIT HI MDM: CPT | Performed by: EMERGENCY MEDICINE

## 2022-04-23 PROCEDURE — 74022 RADEX COMPL AQT ABD SERIES: CPT

## 2022-04-23 PROCEDURE — 70450 CT HEAD/BRAIN W/O DYE: CPT

## 2022-04-23 PROCEDURE — G1004 CDSM NDSC: HCPCS

## 2022-04-23 RX ORDER — PENICILLIN V POTASSIUM 500 MG/1
500 TABLET ORAL 4 TIMES DAILY
Qty: 28 TABLET | Refills: 0 | Status: SHIPPED | OUTPATIENT
Start: 2022-04-23 | End: 2022-04-30

## 2022-04-23 RX ORDER — PENICILLIN V POTASSIUM 250 MG/1
500 TABLET ORAL ONCE
Status: COMPLETED | OUTPATIENT
Start: 2022-04-23 | End: 2022-04-23

## 2022-04-23 RX ORDER — MAGNESIUM CARB/ALUMINUM HYDROX 105-160MG
296 TABLET,CHEWABLE ORAL ONCE
Status: COMPLETED | OUTPATIENT
Start: 2022-04-23 | End: 2022-04-23

## 2022-04-23 RX ADMIN — PENICILLIN V POTASSIUM 500 MG: 250 TABLET, FILM COATED ORAL at 19:35

## 2022-04-23 RX ADMIN — MAGNESIUM CITRATE 296 ML: 1.75 LIQUID ORAL at 19:36

## 2022-04-23 NOTE — ED PROVIDER NOTES
History  Chief Complaint   Patient presents with    Neck Pain     "My PCP gave me a Z-Flaco for my viral infection i had sneezing and green mucous, i finished the abx but dont feel well still"  Patient reports waking up today with left sided neck pain, fatigued, and cramping in his stomach  Took two tylenol 1000mg before arriving to the ER  Patient is a 51-year-old male coming in today complaining of not feeling well  He states several days ago he had in infections that he was treated with a Z-Flaco for for his family doctor  He reports that his sneezing, productive cough have resolved; however, patient with noticeable fogginess and fatigue in my face and eyes  This started yesterday into today  He has no chest pain, shortness of breath, palpitations, cough, syncope  He has no lower extremity edema  He reports that he just feels lot of pressure in his head and in behind his eyes  He has no recent glass changes or trauma to his eyes  He does report he also has pain on the left side of his neck feeling like a bump  He has no difficulty eating or drinking  He is tolerating p o  Well  No recent dental work  He took Tylenol today with minimal relief  Patient reports that he does take Linzess for his constipation however he did want take a 2nd dose        History provided by:  Patient   used: Yes    Neck Pain  Pain location:  L side  Quality:  Aching, stiffness and stabbing  Stiffness is present:  All day  Pain radiates to:  Does not radiate  Pain severity:  Moderate  Onset quality:  Gradual  Timing:  Constant  Progression:  Unchanged  Chronicity:  New  Context: not fall, not jumping from heights, not lifting a heavy object, not MCA, not MVC, not pedestrian accident and not recent injury    Relieved by:  Nothing  Worsened by:  Nothing  Ineffective treatments:  None tried  Associated symptoms: headaches    Associated symptoms: no bladder incontinence, no bowel incontinence, no chest pain, no fever, no leg pain, no numbness, no paresis, no photophobia, no syncope, no tingling, no visual change, no weakness and no weight loss    Risk factors: no hx of head and neck radiation, no hx of osteoporosis, no hx of spinal trauma, no recent epidural, no recent head injury and no recurrent falls        Prior to Admission Medications   Prescriptions Last Dose Informant Patient Reported? Taking?    LORazepam (ATIVAN) 0 5 mg tablet   Yes No   ascorbic acid (VITAMIN C) 500 MG tablet   Yes No   Sig: Take 500 mg by mouth daily   aspirin 81 mg chewable tablet   Yes No   Sig: Chew   atorvastatin (LIPITOR) 40 mg tablet   Yes No   Sig: Take 40 mg by mouth daily   butalbital-acetaminophen-caffeine (FIORICET,ESGIC) -40 mg per tablet   No No   Sig: Take 1 tablet by mouth every 6 (six) hours as needed for headaches for up to 10 doses   carvedilol (COREG) 25 mg tablet   No No   Sig: Take 1 tablet (25 mg total) by mouth 2 (two) times a day with meals   cyclobenzaprine (FLEXERIL) 10 mg tablet   Yes No   Sig: Take 10 mg by mouth 3 (three) times a day as needed   dicyclomine (BENTYL) 20 mg tablet   No No   Sig: Take 1 tablet (20 mg total) by mouth every 6 (six) hours   hyoscyamine (ANASPAZ,LEVSIN) 0 125 MG tablet   No No   Sig: Take 1 tablet (0 125 mg total) by mouth every 4 (four) hours as needed for cramping   levocetirizine (XYZAL) 5 MG tablet   Yes No   Sig: Take 5 mg by mouth every morning   linaCLOtide (Linzess) 290 MCG CAPS   Yes No   Sig: Take 290 mcg by mouth daily   losartan-hydrochlorothiazide (HYZAAR) 100-12 5 MG per tablet   No No   Sig: Take 1 tablet by mouth daily Resume on 4/14   nortriptyline (PAMELOR) 50 mg capsule   Yes No   Sig: Take 50 mg by mouth daily at bedtime   pantoprazole (PROTONIX) 20 mg tablet   No No   Sig: Take 1 tablet (20 mg total) by mouth daily   sildenafil (REVATIO) 20 mg tablet   Yes No   Sig: TAKE THREE TABLETS BY MOUTH ONE HOUR PRIOR AS DIRECTED   Patient not taking: Reported on 2/18/2022   spironolactone (ALDACTONE) 25 mg tablet   No No   Sig: Take 2 tablets (50 mg total) by mouth daily Resume on 4/14   topiramate (TOPAMAX) 25 mg tablet   Yes No   Sig: topiramate 25 mg tablet   take 1 tablet by mouth three times a day   triamcinolone (KENALOG) 0 1 % cream   Yes No   Sig: apply twice a day to rash ON face for 2 weeks then STOP      Facility-Administered Medications: None       Past Medical History:   Diagnosis Date    Eosinophilic esophagitis     History of kidney cancer     2007    Hyperlipidemia     Hypertension     Renal disorder        Past Surgical History:   Procedure Laterality Date    COLONOSCOPY  08/04/2015    Normal by Dr Selma Ramírez at [facility]  12/16/2016    Diverticulosis otherwise normal by Dr Selma Ramírez, [facility] COLONOSCOPY  05/14/2021    normal by Dr Chanelle Downey Right     Renal cell carcinoma    UPPER GASTROINTESTINAL ENDOSCOPY  08/2020    Biopsy positive for eosinophilic esophagitis       Family History   Problem Relation Age of Onset    Lymphoma Mother      I have reviewed and agree with the history as documented  E-Cigarette/Vaping    E-Cigarette Use Never User      E-Cigarette/Vaping Substances     Social History     Tobacco Use    Smoking status: Former Smoker     Packs/day: 0 00    Smokeless tobacco: Never Used   Vaping Use    Vaping Use: Never used   Substance Use Topics    Alcohol use: Never    Drug use: Not Currently       Review of Systems   Constitutional: Negative  Negative for chills, fever and weight loss  HENT: Negative  Negative for ear pain and sore throat  Eyes: Negative  Negative for photophobia, pain and visual disturbance  Respiratory: Negative  Negative for cough and shortness of breath  Cardiovascular: Negative  Negative for chest pain, palpitations and syncope  Gastrointestinal: Negative  Negative for abdominal pain, bowel incontinence and vomiting  Endocrine: Negative  Genitourinary: Negative  Negative for bladder incontinence, dysuria and hematuria  Musculoskeletal: Positive for neck pain  Negative for arthralgias and back pain  Skin: Negative for color change and rash  Neurological: Positive for headaches  Negative for tingling, seizures, syncope, weakness and numbness  Hematological: Negative  Psychiatric/Behavioral: Negative  All other systems reviewed and are negative  Physical Exam  Physical Exam  Vitals and nursing note reviewed  Constitutional:       Appearance: He is well-developed  HENT:      Head: Normocephalic and atraumatic  Right Ear: Hearing, tympanic membrane, ear canal and external ear normal       Left Ear: Hearing, tympanic membrane, ear canal and external ear normal       Nose: Nose normal       Mouth/Throat:      Mouth: Mucous membranes are moist       Pharynx: Posterior oropharyngeal erythema present  No pharyngeal swelling or oropharyngeal exudate  Comments: Patient maintaining airway and secretions  No stridor   No brawniness under tongue  Noted uvula has been removed     Eyes:      General: Lids are normal  Vision grossly intact  Extraocular Movements: Extraocular movements intact  Conjunctiva/sclera: Conjunctivae normal       Pupils: Pupils are equal, round, and reactive to light  Neck:     Cardiovascular:      Rate and Rhythm: Normal rate and regular rhythm  Pulses:           Radial pulses are 2+ on the right side and 2+ on the left side  Heart sounds: No murmur heard  Pulmonary:      Effort: Pulmonary effort is normal  No respiratory distress  Breath sounds: Normal breath sounds  Abdominal:      Palpations: Abdomen is soft  Tenderness: There is no abdominal tenderness  There is no right CVA tenderness, left CVA tenderness, guarding or rebound  Negative signs include Rodney's sign, Rovsing's sign, McBurney's sign, psoas sign and obturator sign     Musculoskeletal:      Cervical back: Neck supple  Lymphadenopathy:      Cervical: Cervical adenopathy present  Right cervical: Superficial cervical adenopathy present  Left cervical: Superficial cervical adenopathy present  Skin:     General: Skin is warm and dry  Capillary Refill: Capillary refill takes less than 2 seconds  Neurological:      General: No focal deficit present  Mental Status: He is alert and oriented to person, place, and time  GCS: GCS eye subscore is 4  GCS verbal subscore is 5  GCS motor subscore is 6  Cranial Nerves: Cranial nerves are intact  Sensory: Sensation is intact  Motor: Motor function is intact  Coordination: Coordination is intact  Gait: Gait is intact  Psychiatric:         Mood and Affect: Mood normal          Behavior: Behavior normal          Thought Content:  Thought content normal          Judgment: Judgment normal          Vital Signs  ED Triage Vitals [04/23/22 1732]   Temperature Pulse Respirations Blood Pressure SpO2   98 5 °F (36 9 °C) 77 18 145/98 98 %      Temp Source Heart Rate Source Patient Position - Orthostatic VS BP Location FiO2 (%)   Oral -- Sitting Left arm --      Pain Score       --           Vitals:    04/23/22 1732   BP: 145/98   Pulse: 77   Patient Position - Orthostatic VS: Sitting         Visual Acuity      ED Medications  Medications   sodium chloride 0 9 % bolus 1,000 mL (has no administration in time range)   penicillin V potassium (VEETID) tablet 500 mg (has no administration in time range)   magnesium citrate (CITROMA) oral solution 296 mL (has no administration in time range)       Diagnostic Studies  Results Reviewed     Procedure Component Value Units Date/Time    Strep A PCR [[id number]]  (Normal) Collected: 04/23/22 1805    Lab Status: Final result Specimen: Throat Updated: 04/23/22 1838     STREP A PCR Not Detected    Comprehensive metabolic panel [[id number]]  (Abnormal) Collected: 04/23/22 1805    Lab Status: Final result Specimen: Blood from Arm, Left Updated: 04/23/22 1824     Sodium 139 mmol/L      Potassium 4 3 mmol/L      Chloride 98 mmol/L      CO2 32 mmol/L      ANION GAP 9 mmol/L      BUN 19 mg/dL      Creatinine 1 38 mg/dL      Glucose 122 mg/dL      Calcium 9 8 mg/dL      AST 17 U/L      ALT 25 U/L      Alkaline Phosphatase 69 U/L      Total Protein 8 6 g/dL      Albumin 4 6 g/dL      Total Bilirubin 0 91 mg/dL      eGFR 58 ml/min/1 73sq m     Narrative:      [place] guidelines for Chronic Kidney Disease (CKD):     Stage 1 with normal or high GFR (GFR > 90 mL/min/1 73 square meters)    Stage 2 Mild CKD (GFR = 60-89 mL/min/1 73 square meters)    Stage 3A Moderate CKD (GFR = 45-59 mL/min/1 73 square meters)    Stage 3B Moderate CKD (GFR = 30-44 mL/min/1 73 square meters)    Stage 4 Severe CKD (GFR = 15-29 mL/min/1 73 square meters)    Stage 5 End Stage CKD (GFR <15 mL/min/1 73 square meters)  Note: GFR calculation is accurate only with a steady state creatinine    Magnesium [[id number]]  (Normal) Collected: 04/23/22 1805    Lab Status: Final result Specimen: Blood from Arm, Left Updated: 04/23/22 1824     Magnesium 1 6 mg/dL     Urine Microscopic [[id number]]  (Normal) Collected: 04/23/22 1805    Lab Status: Final result Specimen: Urine, Clean Catch Updated: 04/23/22 1824     RBC, UA None Seen /hpf      WBC, UA 1-2 /hpf      Epithelial Cells Occasional /hpf      Bacteria, UA Occasional /hpf     UA (URINE) with reflex to Scope [[id number]]  (Abnormal) Collected: 04/23/22 1805    Lab Status: Final result Specimen: Urine, Clean Catch Updated: 04/23/22 1815     Color, UA Straw     Clarity, UA Clear     Specific Gravity, UA 1 015     pH, UA 6 0     Leukocytes, UA 25 0     Nitrite, UA Negative     Protein, UA 30 (1+) mg/dl      Glucose, UA Negative mg/dl      Ketones, UA Negative mg/dl      Bilirubin, UA Negative     Blood, UA Negative     UROBILINOGEN UA Negative mg/dL     CBC and differential [[id number]]  (Abnormal) Collected: 04/23/22 1805    Lab Status: Final result Specimen: Blood from Arm, Left Updated: 04/23/22 1814     WBC 13 66 Thousand/uL      RBC 5 39 Million/uL      Hemoglobin 13 9 g/dL      Hematocrit 45 7 %      MCV 85 fL      MCH 25 8 pg      MCHC 30 4 g/dL      RDW 13 4 %      MPV 9 0 fL      Platelets 330 Thousands/uL      nRBC 0 /100 WBCs      Neutrophils Relative 72 %      Immat GRANS % 1 %      Lymphocytes Relative 17 %      Monocytes Relative 8 %      Eosinophils Relative 2 %      Basophils Relative 0 %      Neutrophils Absolute 9 74 Thousands/µL      Immature Grans Absolute 0 08 Thousand/uL      Lymphocytes Absolute 2 38 Thousands/µL      Monocytes Absolute 1 10 Thousand/µL      Eosinophils Absolute 0 33 Thousand/µL      Basophils Absolute 0 03 Thousands/µL                  CT head without contrast   Final Result by Grisel Ware MD (04/23 1850)      No acute intracranial abnormality  No acute sinusitis  Minimal bifrontal frontal mucosal thickening  Workstation performed: [id number]         CT soft tissue neck wo contrast   Final Result by Grisel Ware MD (04/23 1849)   No acute abnormality  No mass or adenopathy  Workstation performed: [id number]         XR abdomen obstruction series    (Results Pending)              Procedures  Procedures         ED Course  ED Course as of 04/23/22 1928   Sat Apr 23, 2022   1741 Patient is a 77-year-old male coming in today with complaints of head fogginess, neck pain, abdominal discomfort that is been ongoing for 1-2 days  On exam is well-appearing in no acute distress hemodynamically stable  He is not septic appearing  Maintaining airway and secretions  Will CT head and neck given patient's history of cancer as well as noted lymphadenopathy  Will also obtain x-rays patient has no simple constipation    Will also check basic labs including strep     Portions of the record may have been created with voice recognition software  Occasional wrong word or "sound a like" substitutions may have occurred due to the inherent limitations of voice recognition software  Read the chart carefully and recognize, using context, where substitutions have occurred  1809 Patient taken to [facility] [place] [address] Patient's labs are stable  Mild leukocytosis however no bands and no evidence of septicemia  1838 Strep negative  Pending CT's   2893 Patient updated on labs  Pending Ct's  Patient resting in bed in no distress   1854 CTs without acute pathology  X-ray does can show consistent with constipation  Given my physical exam with noted adenopathy will treat empirically for lymphadenitis  Discussed with patient to call and discuss with gastroenterologist regarding his chronic constipation  Will give Mag citrate for home                                             MDM  Number of Diagnoses or Management Options  Diagnosis management comments:     EKG INTERPRETATION @ 1752  RHYTHM:  Normal sinus rhythm at 70 beats per minute  AXIS:  Normal axis  INTERVALS:  CO interval measured at 188 milliseconds  QRS COMPLEX:  QRS measured at 88 milliseconds  ST SEGMENT:  Nonspecific ST segment changes  Diffuse artifact  QT INTERVAL:  QTC measured at 424 milliseconds  COMPARED WITH PRIOR   Tevin Pipe   Interpretation by Alfredo Lewis, DO         Amount and/or Complexity of Data Reviewed  Clinical lab tests: reviewed and ordered  Tests in the radiology section of CPT®: ordered and reviewed  Tests in the medicine section of CPT®: ordered and reviewed  Review and summarize past medical records: yes  Independent visualization of images, tracings, or specimens: yes        Disposition  Final diagnoses:   Neck pain   Lymphadenitis   Constipation     Time reflects when diagnosis was documented in both MDM as applicable and the Disposition within this note     Time User Action Codes Description Comment    4/23/2022  6:56 PM Luz Elena Deutsch Add [M54 2] Neck pain 4/23/2022  6:56 PM Cherylene Edouard Add [I88 9] Lymphadenitis     4/23/2022  6:56 PM Cherylene Edouard Add [K59 00] Constipation       ED Disposition     ED Disposition Condition Date/Time Comment    Discharge Stable Sat Apr 23, 2022  6:56 PM Jayda Grove discharge to home/self care  Follow-up Information     Follow up With Specialties Details Why Contact Info Additional [address] [facility] Schedule an appointment as soon as possible for a visit in 1 week  [address], [address] [facility] [address]  [address], [address], [address] [place], [place], [address]    Gavino Abad MD Family Medicine   [address] [address]             Patient's Medications   Discharge Prescriptions    PENICILLIN V POTASSIUM (VEETID) 500 MG TABLET    Take 1 tablet (500 mg total) by mouth 4 (four) times a day for 7 days       Start Date: 4/23/2022 End Date: 4/30/2022       Order Dose: 500 mg       Quantity: 28 tablet    Refills: 0       No discharge procedures on file      PDMP Review     None          ED Provider  Electronically Signed by           Surjit Resendiz DO  04/23/22 1928

## 2022-04-23 NOTE — ED NOTES
IV #20 jelco removed from left A/C, insertion not documented by previous shift       Maranda Fisher, RN  04/23/22 4070

## 2022-05-02 ENCOUNTER — HOSPITAL ENCOUNTER (EMERGENCY)
Facility: HOSPITAL | Age: 52
Discharge: HOME | End: 2022-05-02
Attending: EMERGENCY MEDICINE | Admitting: EMERGENCY MEDICINE
Payer: COMMERCIAL

## 2022-05-02 ENCOUNTER — APPOINTMENT (EMERGENCY)
Dept: CT IMAGING | Facility: HOSPITAL | Age: 52
End: 2022-05-02
Payer: COMMERCIAL

## 2022-05-02 VITALS
OXYGEN SATURATION: 97 % | HEART RATE: 79 BPM | WEIGHT: 315 LBS | SYSTOLIC BLOOD PRESSURE: 130 MMHG | DIASTOLIC BLOOD PRESSURE: 84 MMHG | TEMPERATURE: 98 F | BODY MASS INDEX: 47.56 KG/M2 | RESPIRATION RATE: 19 BRPM

## 2022-05-02 DIAGNOSIS — R07.89 ATYPICAL CHEST PAIN: ICD-10-CM

## 2022-05-02 DIAGNOSIS — N28.1 RENAL CYST: ICD-10-CM

## 2022-05-02 DIAGNOSIS — J43.9 EMPHYSEMA LUNG (HCC): ICD-10-CM

## 2022-05-02 DIAGNOSIS — R14.0 ABDOMINAL BLOATING: Primary | ICD-10-CM

## 2022-05-02 LAB
ALBUMIN SERPL BCP-MCNC: 4.6 G/DL (ref 3–5.2)
ALP SERPL-CCNC: 83 U/L (ref 43–122)
ALT SERPL W P-5'-P-CCNC: 27 U/L
ANION GAP SERPL CALCULATED.3IONS-SCNC: 11 MMOL/L (ref 5–14)
AST SERPL W P-5'-P-CCNC: 21 U/L (ref 17–59)
ATRIAL RATE: 71 BPM
BASOPHILS # BLD AUTO: 0.06 THOUSANDS/ΜL (ref 0–0.1)
BASOPHILS NFR BLD AUTO: 0 % (ref 0–1)
BILIRUB SERPL-MCNC: 0.81 MG/DL
BUN SERPL-MCNC: 20 MG/DL (ref 5–25)
CALCIUM SERPL-MCNC: 9.5 MG/DL (ref 8.4–10.2)
CARDIAC TROPONIN I PNL SERPL HS: 3 NG/L
CHLORIDE SERPL-SCNC: 97 MMOL/L (ref 97–108)
CO2 SERPL-SCNC: 30 MMOL/L (ref 22–30)
CREAT SERPL-MCNC: 1.18 MG/DL (ref 0.7–1.5)
EOSINOPHIL # BLD AUTO: 0.55 THOUSAND/ΜL (ref 0–0.61)
EOSINOPHIL NFR BLD AUTO: 4 % (ref 0–6)
ERYTHROCYTE [DISTWIDTH] IN BLOOD BY AUTOMATED COUNT: 13.6 % (ref 11.6–15.1)
GFR SERPL CREATININE-BSD FRML MDRD: 71 ML/MIN/1.73SQ M
GLUCOSE SERPL-MCNC: 108 MG/DL (ref 70–99)
HCT VFR BLD AUTO: 46.7 % (ref 36.5–49.3)
HGB BLD-MCNC: 14.5 G/DL (ref 12–17)
IMM GRANULOCYTES # BLD AUTO: 0.09 THOUSAND/UL (ref 0–0.2)
IMM GRANULOCYTES NFR BLD AUTO: 1 % (ref 0–2)
LIPASE SERPL-CCNC: 151 U/L (ref 23–300)
LYMPHOCYTES # BLD AUTO: 2.5 THOUSANDS/ΜL (ref 0.6–4.47)
LYMPHOCYTES NFR BLD AUTO: 16 % (ref 14–44)
MCH RBC QN AUTO: 26.3 PG (ref 26.8–34.3)
MCHC RBC AUTO-ENTMCNC: 31 G/DL (ref 31.4–37.4)
MCV RBC AUTO: 85 FL (ref 82–98)
MONOCYTES # BLD AUTO: 1.33 THOUSAND/ΜL (ref 0.17–1.22)
MONOCYTES NFR BLD AUTO: 9 % (ref 4–12)
NEUTROPHILS # BLD AUTO: 11.12 THOUSANDS/ΜL (ref 1.85–7.62)
NEUTS SEG NFR BLD AUTO: 70 % (ref 43–75)
NRBC BLD AUTO-RTO: 0 /100 WBCS
NT-PROBNP SERPL-MCNC: <12 PG/ML (ref 0–299)
P AXIS: 67 DEGREES
PLATELET # BLD AUTO: 335 THOUSANDS/UL (ref 149–390)
PMV BLD AUTO: 9.5 FL (ref 8.9–12.7)
POTASSIUM SERPL-SCNC: 4.6 MMOL/L (ref 3.6–5)
PR INTERVAL: 188 MS
PROT SERPL-MCNC: 8.5 G/DL (ref 5.9–8.4)
QRS AXIS: 4 DEGREES
QRSD INTERVAL: 86 MS
QT INTERVAL: 376 MS
QTC INTERVAL: 408 MS
RBC # BLD AUTO: 5.51 MILLION/UL (ref 3.88–5.62)
SODIUM SERPL-SCNC: 138 MMOL/L (ref 137–147)
T WAVE AXIS: 65 DEGREES
VENTRICULAR RATE: 71 BPM
WBC # BLD AUTO: 15.65 THOUSAND/UL (ref 4.31–10.16)

## 2022-05-02 PROCEDURE — 99285 EMERGENCY DEPT VISIT HI MDM: CPT

## 2022-05-02 PROCEDURE — 93005 ELECTROCARDIOGRAM TRACING: CPT

## 2022-05-02 PROCEDURE — 80053 COMPREHEN METABOLIC PANEL: CPT

## 2022-05-02 PROCEDURE — 71275 CT ANGIOGRAPHY CHEST: CPT

## 2022-05-02 PROCEDURE — 74174 CTA ABD&PLVS W/CONTRAST: CPT

## 2022-05-02 PROCEDURE — 84484 ASSAY OF TROPONIN QUANT: CPT

## 2022-05-02 PROCEDURE — 83880 ASSAY OF NATRIURETIC PEPTIDE: CPT

## 2022-05-02 PROCEDURE — 36415 COLL VENOUS BLD VENIPUNCTURE: CPT

## 2022-05-02 PROCEDURE — 85025 COMPLETE CBC W/AUTO DIFF WBC: CPT

## 2022-05-02 PROCEDURE — 93010 ELECTROCARDIOGRAM REPORT: CPT | Performed by: INTERNAL MEDICINE

## 2022-05-02 PROCEDURE — G1004 CDSM NDSC: HCPCS

## 2022-05-02 PROCEDURE — 83690 ASSAY OF LIPASE: CPT

## 2022-05-02 RX ADMIN — IOHEXOL 100 ML: 350 INJECTION, SOLUTION INTRAVENOUS at 16:51

## 2022-05-02 NOTE — ED PROVIDER NOTES
History  Chief Complaint   Patient presents with    Chest Pain     "It feels weird here but on the right upper back it feels like a pressure " Patient points to left chest  Denies SOB, nausea   Bloated     X2 days, last BM 2 days ago  Patient is a 24-year-old male who comes in for complaint of left chest pain and back discomfort for the last 2 days  Patient was recently treated for lymphadenitis with penicillin  Patient does have a history of hypertension, and hypercholesteremia  Patient denies any nausea vomiting, abdominal pain, shortness of breath, headache, lightheadedness, or any other symptoms at this time      History provided by:  Patient   used: No    Chest Pain  Pain location:  L chest  Pain radiates to:  Upper back  Pain severity:  Mild  Duration:  2 days  Associated symptoms: back pain    Associated symptoms: no abdominal pain, no cough, no diaphoresis, no dizziness, no fatigue, no fever, no headache, no lower extremity edema, no nausea, no numbness, no shortness of breath, not vomiting and no weakness    Risk factors: high cholesterol, hypertension and male sex        Prior to Admission Medications   Prescriptions Last Dose Informant Patient Reported? Taking?    LORazepam (ATIVAN) 0 5 mg tablet   Yes No   ascorbic acid (VITAMIN C) 500 MG tablet   Yes No   Sig: Take 500 mg by mouth daily   aspirin 81 mg chewable tablet   Yes No   Sig: Chew   atorvastatin (LIPITOR) 40 mg tablet   Yes No   Sig: Take 40 mg by mouth daily   butalbital-acetaminophen-caffeine (FIORICET,ESGIC) -40 mg per tablet   No No   Sig: Take 1 tablet by mouth every 6 (six) hours as needed for headaches for up to 10 doses   carvedilol (COREG) 25 mg tablet   No No   Sig: Take 1 tablet (25 mg total) by mouth 2 (two) times a day with meals   cyclobenzaprine (FLEXERIL) 10 mg tablet   Yes No   Sig: Take 10 mg by mouth 3 (three) times a day as needed   dicyclomine (BENTYL) 20 mg tablet   No No   Sig: Take 1 tablet (20 mg total) by mouth every 6 (six) hours   hyoscyamine (ANASPAZ,LEVSIN) 0 125 MG tablet   No No   Sig: Take 1 tablet (0 125 mg total) by mouth every 4 (four) hours as needed for cramping   levocetirizine (XYZAL) 5 MG tablet   Yes No   Sig: Take 5 mg by mouth every morning   linaCLOtide (Linzess) 290 MCG CAPS   Yes No   Sig: Take 290 mcg by mouth daily   losartan-hydrochlorothiazide (HYZAAR) 100-12 5 MG per tablet   No No   Sig: Take 1 tablet by mouth daily Resume on 4/14   nortriptyline (PAMELOR) 50 mg capsule   Yes No   Sig: Take 50 mg by mouth daily at bedtime   pantoprazole (PROTONIX) 20 mg tablet   No No   Sig: Take 1 tablet (20 mg total) by mouth daily   sildenafil (REVATIO) 20 mg tablet   Yes No   Sig: TAKE THREE TABLETS BY MOUTH ONE HOUR PRIOR AS DIRECTED   Patient not taking: Reported on 2/18/2022   spironolactone (ALDACTONE) 25 mg tablet   No No   Sig: Take 2 tablets (50 mg total) by mouth daily Resume on 4/14   topiramate (TOPAMAX) 25 mg tablet   Yes No   Sig: topiramate 25 mg tablet   take 1 tablet by mouth three times a day   triamcinolone (KENALOG) 0 1 % cream   Yes No   Sig: apply twice a day to rash ON face for 2 weeks then STOP      Facility-Administered Medications: None       Past Medical History:   Diagnosis Date    Eosinophilic esophagitis     History of kidney cancer     2007    Hyperlipidemia     Hypertension     Renal disorder        Past Surgical History:   Procedure Laterality Date    COLONOSCOPY  08/04/2015    Normal by Dr Teja Arguelles at [address] [place] [address]  12/16/2016    Diverticulosis otherwise normal by Dr Teja Arguelles, [facility]    COLONOSCOPY  05/14/2021    normal by Dr Destini Claros Right     Renal cell carcinoma    UPPER GASTROINTESTINAL ENDOSCOPY  08/2020    Biopsy positive for eosinophilic esophagitis       Family History   Problem Relation Age of Onset    Lymphoma Mother      I have reviewed and agree with the history as documented  E-Cigarette/Vaping    E-Cigarette Use Never User      E-Cigarette/Vaping Substances     Social History     Tobacco Use    Smoking status: Former Smoker     Packs/day: 0 00    Smokeless tobacco: Never Used   Vaping Use    Vaping Use: Never used   Substance Use Topics    Alcohol use: Never    Drug use: Not Currently       Review of Systems   Constitutional: Negative  Negative for activity change, appetite change, diaphoresis, fatigue and fever  HENT: Negative  Negative for ear pain  Eyes: Negative  Respiratory: Negative  Negative for cough, chest tightness and shortness of breath  Cardiovascular: Positive for chest pain  Gastrointestinal: Negative  Negative for abdominal pain, nausea and vomiting  Genitourinary: Negative  Musculoskeletal: Positive for back pain  Skin: Negative  Neurological: Negative  Negative for dizziness, weakness, numbness and headaches  Psychiatric/Behavioral: Negative  Physical Exam  Physical Exam  Vitals reviewed  Constitutional:       Appearance: He is well-developed  He is obese  HENT:      Head: Normocephalic and atraumatic  Right Ear: External ear normal       Left Ear: External ear normal       Nose: Nose normal    Eyes:      Conjunctiva/sclera: Conjunctivae normal    Cardiovascular:      Rate and Rhythm: Normal rate  Pulmonary:      Effort: Pulmonary effort is normal    Abdominal:      Palpations: Abdomen is soft  There is no mass  Tenderness: There is no abdominal tenderness  Musculoskeletal:         General: Normal range of motion  Cervical back: Normal range of motion  Skin:     General: Skin is warm and dry  Neurological:      Mental Status: He is alert           Vital Signs  ED Triage Vitals   Temperature Pulse Respirations Blood Pressure SpO2   05/02/22 1622 05/02/22 1555 05/02/22 1555 05/02/22 1555 05/02/22 1555   98 °F (36 7 °C) 79 19 130/84 97 %      Temp Source Heart Rate Source Patient Position - Orthostatic VS BP Location FiO2 (%)   05/02/22 1622 05/02/22 1555 05/02/22 1555 05/02/22 1555 --   Oral Monitor Lying Left arm       Pain Score       05/02/22 1555       7           Vitals:    05/02/22 1555   BP: 130/84   Pulse: 79   Patient Position - Orthostatic VS: Lying         Visual Acuity      ED Medications  Medications   iohexol (OMNIPAQUE) 350 MG/ML injection (SINGLE-DOSE) 100 mL (100 mL Intravenous Given 5/2/22 1651)       Diagnostic Studies  Results Reviewed     Procedure Component Value Units Date/Time    HS Troponin 0hr (reflex protocol) [[id number]]  (Normal) Collected: 05/02/22 1615    Lab Status: Final result Specimen: Blood from Arm, Right Updated: 05/02/22 1658     hs TnI 0hr 3 ng/L     NT-BNP PRO [[id number]]  (Normal) Collected: 05/02/22 1615    Lab Status: Final result Specimen: Blood from Arm, Right Updated: 05/02/22 1657     NT-proBNP <12 pg/mL     Lipase [[id number]]  (Normal) Collected: 05/02/22 1615    Lab Status: Final result Specimen: Blood from Arm, Right Updated: 05/02/22 1650     Lipase 151 u/L     Comprehensive metabolic panel [[id number]]  (Abnormal) Collected: 05/02/22 1615    Lab Status: Final result Specimen: Blood from Arm, Right Updated: 05/02/22 1649     Sodium 138 mmol/L      Potassium 4 6 mmol/L      Chloride 97 mmol/L      CO2 30 mmol/L      ANION GAP 11 mmol/L      BUN 20 mg/dL      Creatinine 1 18 mg/dL      Glucose 108 mg/dL      Calcium 9 5 mg/dL      AST 21 U/L      ALT 27 U/L      Alkaline Phosphatase 83 U/L      Total Protein 8 5 g/dL      Albumin 4 6 g/dL      Total Bilirubin 0 81 mg/dL      eGFR 71 ml/min/1 73sq m     Narrative:      Fay guidelines for Chronic Kidney Disease (CKD):     Stage 1 with normal or high GFR (GFR > 90 mL/min/1 73 square meters)    Stage 2 Mild CKD (GFR = 60-89 mL/min/1 73 square meters)    Stage 3A Moderate CKD (GFR = 45-59 mL/min/1 73 square meters)    Stage 3B Moderate CKD (GFR = 30-44 mL/min/1 73 square meters)    Stage 4 Severe CKD (GFR = 15-29 mL/min/1 73 square meters)    Stage 5 End Stage CKD (GFR <15 mL/min/1 73 square meters)  Note: GFR calculation is accurate only with a steady state creatinine    CBC and differential [[id number]]  (Abnormal) Collected: 05/02/22 1615    Lab Status: Final result Specimen: Blood from Arm, Right Updated: 05/02/22 1627     WBC 15 65 Thousand/uL      RBC 5 51 Million/uL      Hemoglobin 14 5 g/dL      Hematocrit 46 7 %      MCV 85 fL      MCH 26 3 pg      MCHC 31 0 g/dL      RDW 13 6 %      MPV 9 5 fL      Platelets 238 Thousands/uL      nRBC 0 /100 WBCs      Neutrophils Relative 70 %      Immat GRANS % 1 %      Lymphocytes Relative 16 %      Monocytes Relative 9 %      Eosinophils Relative 4 %      Basophils Relative 0 %      Neutrophils Absolute 11 12 Thousands/µL      Immature Grans Absolute 0 09 Thousand/uL      Lymphocytes Absolute 2 50 Thousands/µL      Monocytes Absolute 1 33 Thousand/µL      Eosinophils Absolute 0 55 Thousand/µL      Basophils Absolute 0 06 Thousands/µL                  CTA dissection protocol chest/abdomen/pelvis   Final Result by Angy Mccann MD (05/02 1739)      No aortic dissection or aneurysm  Stable emphysema  Hepatomegaly  Left kidney again demonstrates multiple cysts throughout  Again noted is a heterogeneous apparently enhancing exophytic lesion from the upper pole slightly enlarged compared to prior CT study from 9/17/2021, measuring 1 6 cm could represent a solid    lesion given the enhancement  Cannot exclude renal cell carcinoma                 Workstation performed: [id number]                    Procedures  Procedures         ED Course  ED Course as of 05/02/22 1815   Mon May 02, 2022   1628 WBC(!): 15 65   1703 hs TnI 0hr: 3   1703 NT-proBNP: <12   1715 Ventricular rate 71 beats per minute  KS interval 188 milliseconds  QRS duration 86 milliseconds  QT//408 milliseconds  PRT axes 67, 4, 65,    ECG interpretation-"NSR, septal infarct, age undetermined, abnormal ECG"     1741 CTA dissection protocol chest/abdomen/pelvis  No aortic dissection or aneurysm      Stable emphysema      Hepatomegaly      Left kidney again demonstrates multiple cysts throughout  Again noted is a heterogeneous apparently enhancing exophytic lesion from the upper pole slightly enlarged compared to prior CT study from 9/17/2021, measuring 1 6 cm could represent a solid   lesion given the enhancement  Cannot exclude renal cell carcinoma  HEART Risk Score      Most Recent Value   Heart Score Risk Calculator    History 0 Filed at: 05/02/2022 1708   ECG 0 Filed at: 05/02/2022 1708   Age 1 Filed at: 05/02/2022 1708   Risk Factors 2 Filed at: 05/02/2022 1708   Troponin 0 Filed at: 05/02/2022 1708   HEART Score 3 Filed at: 05/02/2022 1708                        SBIRT 20yo+      Most Recent Value   SBIRT (25 yo +)    In order to provide better care to our patients, we are screening all of our patients for alcohol and drug use  Would it be okay to ask you these screening questions? No Filed at: 05/02/2022 1627                    MDM  Number of Diagnoses or Management Options  Abdominal bloating: established and worsening  Atypical chest pain: new and does not require workup  Emphysema lung ([address] ): minor  Renal cyst: established and worsening  Diagnosis management comments: Patient is a 29-year-old male who comes in for complaint of chest pain for the last 2 days that radiates to his back  Patient no acute distress at this time  Patient has no signs of cardiac involvement per troponin, CT, and EKG  No sign of pancreatitis  Nose distal normal cause of patient's chest pain could be found, but ruled out cardiac involvement, or abdominal pathology  Patient was not aware of his emphysema diagnosis, the inner advised to follow-up with his PCP    Patient is advised to follow-up with his nephrologist for further evaluation with renal cyst  Patient states he has an upcoming appointment    Counseling: I had a detailed discussion with the patient and/or guardian regarding: the historical points, exam findings, and any diagnostic results supporting the discharge diagnosis, lab results, radiology results, discharge instructions reviewed with patient and/or family/caregiver and understanding was verbalized  Instructions given to return to the emergency department if symptoms worsen or persist, or if there are any questions or concerns that arise at home       All labs reviewed and utilized in the medical decision making process     All radiology studies independently viewed by me and interpreted by the radiologist     Portions of the record may have been created with voice recognition software   Occasional wrong word or "sound a like" substitutions may have occurred due to the inherent limitations of voice recognition software   Read the chart carefully and recognize, using context, where substitutions have occurred           Amount and/or Complexity of Data Reviewed  Clinical lab tests: ordered and reviewed  Tests in the radiology section of CPT®: ordered and reviewed    Risk of Complications, Morbidity, and/or Mortality  Presenting problems: low  Diagnostic procedures: minimal  Management options: minimal    Patient Progress  Patient progress: stable      Disposition  Final diagnoses:   Abdominal bloating   Atypical chest pain   Emphysema lung ([address] )   Renal cyst     Time reflects when diagnosis was documented in both MDM as applicable and the Disposition within this note     Time User Action Codes Description Comment    5/2/2022  5:49 PM Loreda Grist Add [R14 0] Abdominal bloating     5/2/2022  5:49 PM Loreda Grist Add [R07 89] Atypical chest pain     5/2/2022  5:49 PM Loreda Grist Add [J43 9] Emphysema lung ([address] )     5/2/2022  5:49 PM Loreda Grist Add [N28 1] Renal cyst       ED Disposition     ED Disposition Condition Date/Time Comment Discharge Stable Mon May 2, 2022  5:49 PM Deedee Elis discharge to home/self care              Follow-up Information     Follow up With Specialties Details Why Contact Info Additional Information    Walt [facility] [address]  [facility]  [place] [address]  [facility] Emergency Department Emergency Medicine  As needed, If symptoms worsen [address] [id number] [address] [facility] Emergency Department          Discharge Medication List as of 5/2/2022  5:49 PM      CONTINUE these medications which have NOT CHANGED    Details   ascorbic acid (VITAMIN C) 500 MG tablet Take 500 mg by mouth daily, Historical Med      aspirin 81 mg chewable tablet Chew, Historical Med      atorvastatin (LIPITOR) 40 mg tablet Take 40 mg by mouth daily, Historical Med      butalbital-acetaminophen-caffeine (FIORICET,ESGIC) -40 mg per tablet Take 1 tablet by mouth every 6 (six) hours as needed for headaches for up to 10 doses, Starting Fri 9/17/2021, Normal      carvedilol (COREG) 25 mg tablet Take 1 tablet (25 mg total) by mouth 2 (two) times a day with meals, Starting Mon 4/12/2021, Until Tue 4/12/2022, Normal      cyclobenzaprine (FLEXERIL) 10 mg tablet Take 10 mg by mouth 3 (three) times a day as needed, Historical Med      dicyclomine (BENTYL) 20 mg tablet Take 1 tablet (20 mg total) by mouth every 6 (six) hours, Starting Mon 8/23/2021, Until Fri 2/18/2022, Normal      hyoscyamine (ANASPAZ,LEVSIN) 0 125 MG tablet Take 1 tablet (0 125 mg total) by mouth every 4 (four) hours as needed for cramping, Starting Mon 8/16/2021, Normal      levocetirizine (XYZAL) 5 MG tablet Take 5 mg by mouth every morning, Starting Wed 7/29/2020, Historical Med      linaCLOtide (Linzess) 290 MCG CAPS Take 290 mcg by mouth daily, Historical Med      LORazepam (ATIVAN) 0 5 mg tablet Starting Tue 8/25/2020, Historical Med      losartan-hydrochlorothiazide (HYZAAR) 100-12 5 MG per tablet Take 1 tablet by mouth daily Resume on 4/14, Starting Mon 4/12/2021, No Print      nortriptyline (PAMELOR) 50 mg capsule Take 50 mg by mouth daily at bedtime, Starting Mon 7/12/2021, Historical Med      pantoprazole (PROTONIX) 20 mg tablet Take 1 tablet (20 mg total) by mouth daily, Starting Thu 5/27/2021, Until Fri 2/18/2022, Normal      sildenafil (REVATIO) 20 mg tablet TAKE THREE TABLETS BY MOUTH ONE HOUR PRIOR AS DIRECTED, Historical Med      spironolactone (ALDACTONE) 25 mg tablet Take 2 tablets (50 mg total) by mouth daily Resume on 4/14, Starting Mon 4/12/2021, No Print      topiramate (TOPAMAX) 25 mg tablet topiramate 25 mg tablet   take 1 tablet by mouth three times a day, Historical Med      triamcinolone (KENALOG) 0 1 % cream apply twice a day to rash ON face for 2 weeks then STOP, Historical Med             No discharge procedures on file      PDMP Review     None          ED Provider  Electronically Signed by           Shine Estrada PA-C  05/02/22 7906

## 2022-05-02 NOTE — Clinical Note
Jeannine Solis was seen and treated in our emergency department on 5/2/2022  No restrictions            Diagnosis:     Dave Gallo    He may return on this date: If you have any questions or concerns, please don't hesitate to call        Nakita Bishop PA-C    ______________________________           _______________          _______________  Hospital Representative                              Date                                Time

## 2022-05-02 NOTE — ED NOTES
This RN confirmed with provider that pt does not need to wait for kidney function results prior to dissection CTA     Cecille Hdz, RN  05/02/22 1611

## 2022-05-06 NOTE — H&P (VIEW-ONLY)
[facility] - Outpatient Note  Abi Montoya 46 y o  male MRN: [id number]  Unit/Bed#:  Encounter: [id number]          ASSESSMENT AND PLAN:    1  History of eosinophilic esophagitis, No dysphagia  2   Atypical chest pain and epigastric pain with negative cardiac workup  ENT requesting EGD evaluation  3   Irritable bowel syndrome constipation predominant  Linzess no longer working as well but not taking daily  Recent obstruction series normal     1   The patient will be scheduled for EGD evaluation at this time  Risks and benefits of procedure were reviewed with patient  Patient verbalized understanding and agrees to proceed  Will be scheduled at [facility] due to elevated BMI  2   Continue with famotidine daily (taking [facility] brand)  Pantoprazole as needed  GERD diet and lifestyle modifications, weight reduction advised  3   Patient taking Linzess 290 mg 3 times per week  Advised to take daily as this was effective in the past   He will notify our office if daily dose is too strong, with decreased to 145 mg       Diagnoses and all orders for this visit:    Atypical chest pain  -     EGD; Future    Epigastric pain  -     EGD; Future    Eosinophilic esophagitis  -     EGD; Future    Irritable bowel syndrome without diarrhea    Other orders  -     Azelastine HCl 137 MCG/SPRAY SOLN  -     penicillin V potassium (VEETID) 500 mg tablet; Take 500 mg by mouth 4 (four) times a day  -     famotidine (PEPCID) 20 mg tablet; Take 20 mg by mouth daily [facility] brand per pt          ______________________________________________________________________    HPI:    The patient returns to the office today at the request of his ENT to discuss scheduling EGD evaluation  The patient has a history of eosinophilic esophagitis with last EGD by Dr Luis Antonio Subramanian 8/2020 for symptoms of dysphagia and was empirically dilated with [address]  Since then he has had no dysphagia symptoms and none at present    He has been experiencing epigastric discomfort with intermittent atypical chest pain  Reports cardiac workup has been normal   Reports his ENT suggested repeat EGD evaluation  The patient has also been following with ENT recently after developing neck lymphadenopathy, CT soft tissue neck normal   Patient is a long-time history of irritable bowel syndrome constipation predominant which has been well controlled on Linzess 290 mg  Patient states he takes it only 3 times a week, Tuesday, Thursday, Saturday due to fear that he would have an accident at work  Recently the medication has not been as effective with smaller bowel movements  I advised to take daily  If the dose is too strong, he will call me and I will decreased to 145 mg daily  No obstructive symptoms  No lower abdominal pain  Denies melena or hematochezia  REVIEW OF SYSTEMS:    CONSTITUTIONAL: Denies any fever, chills, rigors, and weight loss  HEENT: No earache or tinnitus  Denies hearing loss or visual disturbances  CARDIOVASCULAR: No chest pain or palpitations  RESPIRATORY: Denies any cough, hemoptysis, shortness of breath or dyspnea on exertion  GASTROINTESTINAL: As noted in the History of Present Illness  GENITOURINARY: No problems with urination  Denies any hematuria or dysuria  NEUROLOGIC: No dizziness or vertigo, denies headaches  MUSCULOSKELETAL: Denies any muscle or joint pain  SKIN: Denies skin rashes or itching  ENDOCRINE: Denies excessive thirst  Denies intolerance to heat or cold  PSYCHOSOCIAL: Denies depression or anxiety  Denies any recent memory loss         Historical Information   Past Medical History:   Diagnosis Date    Eosinophilic esophagitis     History of kidney cancer     2007    Hyperlipidemia     Hypertension     Renal disorder      Past Surgical History:   Procedure Laterality Date    COLONOSCOPY  08/04/2015    Normal by Dr Felicita Fierro at [address] [place] [address]  12/16/2016    Diverticulosis otherwise normal by Deangelo Higuera    COLONOSCOPY  05/14/2021    normal by Dr Dustin Rodriguez Right     Renal cell carcinoma    UPPER GASTROINTESTINAL ENDOSCOPY  08/2020    Biopsy positive for eosinophilic esophagitis  Dr Jonna Shields  Esophagus normal, empiric dilated with [address]  Negative H  pylori  Ref to ENT  Social History   Social History     Substance and Sexual Activity   Alcohol Use Never     Social History     Substance and Sexual Activity   Drug Use Not Currently     Social History     Tobacco Use   Smoking Status Former Smoker    Packs/day: 0 00   Smokeless Tobacco Never Used     Family History   Problem Relation Age of Onset    Lymphoma Mother        Meds/Allergies       Current Outpatient Medications:     ascorbic acid (VITAMIN C) 500 MG tablet    aspirin 81 mg chewable tablet    atorvastatin (LIPITOR) 40 mg tablet    Azelastine HCl 137 MCG/SPRAY SOLN    butalbital-acetaminophen-caffeine (FIORICET,ESGIC) -40 mg per tablet    cyclobenzaprine (FLEXERIL) 10 mg tablet    famotidine (PEPCID) 20 mg tablet    hyoscyamine (ANASPAZ,LEVSIN) 0 125 MG tablet    levocetirizine (XYZAL) 5 MG tablet    linaCLOtide (Linzess) 290 MCG CAPS    LORazepam (ATIVAN) 0 5 mg tablet    losartan-hydrochlorothiazide (HYZAAR) 100-12 5 MG per tablet    nortriptyline (PAMELOR) 50 mg capsule    penicillin V potassium (VEETID) 500 mg tablet    spironolactone (ALDACTONE) 25 mg tablet    topiramate (TOPAMAX) 25 mg tablet    triamcinolone (KENALOG) 0 1 % cream    carvedilol (COREG) 25 mg tablet    dicyclomine (BENTYL) 20 mg tablet    pantoprazole (PROTONIX) 20 mg tablet    sildenafil (REVATIO) 20 mg tablet    Allergies   Allergen Reactions    Sulfamethoxazole-Trimethoprim      Throat swelling      Latex            Objective     Blood pressure 162/88, pulse 83, temperature (!) 96 4 °F (35 8 °C), height 5' 8 5" (1 74 m), weight (!) 140 kg (308 lb 6 4 oz)  Body mass index is 46 21 kg/m²          PHYSICAL EXAM:      General Appearance:   Alert, cooperative, no distress   HEENT:   Normocephalic, atraumatic, anicteric  Neck:  Supple, symmetrical, trachea midline   Lungs:   Clear to auscultation bilaterally; no rales, rhonchi or wheezing; respirations unlabored    Heart[de-identified]   Regular rate and rhythm; no murmur, rub, or gallop     Abdomen:   Soft, non-tender, non-distended; normal bowel sounds; no masses, no organomegaly    Genitalia:   Deferred    Rectal:   Deferred    Extremities:  No cyanosis, clubbing or edema    Pulses:  2+ and symmetric all extremities    Skin:  No jaundice, rashes, or lesions    Lymph nodes:  No palpable cervical lymphadenopathy        Lab Results:   Admission on 05/02/2022, Discharged on 05/02/2022   Component Date Value    hs TnI 0hr 05/02/2022 3     NT-proBNP 05/02/2022 <12     WBC 05/02/2022 15 65*    RBC 05/02/2022 5 51     Hemoglobin 05/02/2022 14 5     Hematocrit 05/02/2022 46 7     MCV 05/02/2022 85     MCH 05/02/2022 26 3*    MCHC 05/02/2022 31 0*    RDW 05/02/2022 13 6     MPV 05/02/2022 9 5     Platelets 84/60/5066 335     nRBC 05/02/2022 0     Neutrophils Relative 05/02/2022 70     Immat GRANS % 05/02/2022 1     Lymphocytes Relative 05/02/2022 16     Monocytes Relative 05/02/2022 9     Eosinophils Relative 05/02/2022 4     Basophils Relative 05/02/2022 0     Neutrophils Absolute 05/02/2022 11 12*    Immature Grans Absolute 05/02/2022 0 09     Lymphocytes Absolute 05/02/2022 2 50     Monocytes Absolute 05/02/2022 1 33*    Eosinophils Absolute 05/02/2022 0 55     Basophils Absolute 05/02/2022 0 06     Sodium 05/02/2022 138     Potassium 05/02/2022 4 6     Chloride 05/02/2022 97     CO2 05/02/2022 30     ANION GAP 05/02/2022 11     BUN 05/02/2022 20     Creatinine 05/02/2022 1 18     Glucose 05/02/2022 108*    Calcium 05/02/2022 9 5     AST 05/02/2022 21     ALT 05/02/2022 27     Alkaline Phosphatase 05/02/2022 83     Total Protein 05/02/2022 8 5*    Albumin 05/02/2022 4 6     Total Bilirubin 05/02/2022 0 81     eGFR 05/02/2022 71     Lipase 05/02/2022 151     Ventricular Rate 05/02/2022 71     Atrial Rate 05/02/2022 71     [place] Interval 05/02/2022 188     QRSD Interval 05/02/2022 86     QT Interval 05/02/2022 376     QTC Interval 05/02/2022 408     P Axis 05/02/2022 67     QRS Axis 05/02/2022 4     T Wave [place] 05/02/2022 65    Admission on 04/23/2022, Discharged on 04/23/2022   Component Date Value    WBC 04/23/2022 13 66*    RBC 04/23/2022 5 39     Hemoglobin 04/23/2022 13 9     Hematocrit 04/23/2022 45 7     MCV 04/23/2022 85     MCH 04/23/2022 25 8*    MCHC 04/23/2022 30 4*    RDW 04/23/2022 13 4     MPV 04/23/2022 9 0     Platelets 08/52/7999 337     nRBC 04/23/2022 0     Neutrophils Relative 04/23/2022 72     Immat GRANS % 04/23/2022 1     Lymphocytes Relative 04/23/2022 17     Monocytes Relative 04/23/2022 8     Eosinophils Relative 04/23/2022 2     Basophils Relative 04/23/2022 0     Neutrophils Absolute 04/23/2022 9 74*    Immature Grans Absolute 04/23/2022 0 08     Lymphocytes Absolute 04/23/2022 2 38     Monocytes Absolute 04/23/2022 1 10     Eosinophils Absolute 04/23/2022 0 33     Basophils Absolute 04/23/2022 0 03     Sodium 04/23/2022 139     Potassium 04/23/2022 4 3     Chloride 04/23/2022 98     CO2 04/23/2022 32*    ANION GAP 04/23/2022 9     BUN 04/23/2022 19     Creatinine 04/23/2022 1 38     Glucose 04/23/2022 122*    Calcium 04/23/2022 9 8     AST 04/23/2022 17     ALT 04/23/2022 25     Alkaline Phosphatase 04/23/2022 69     Total Protein 04/23/2022 8 6*    Albumin 04/23/2022 4 6     Total Bilirubin 04/23/2022 0 91     eGFR 04/23/2022 58     Magnesium 04/23/2022 1 6     Color, UA 04/23/2022 Straw     Clarity, UA 04/23/2022 Clear     Specific Gravity, UA 04/23/2022 1 015     pH, UA 04/23/2022 6 0     Leukocytes, UA 04/23/2022 25 0*    Nitrite, UA 04/23/2022 Negative     Protein, UA 04/23/2022 30 (1+)*    Glucose, UA 04/23/2022 Negative     Ketones, UA 04/23/2022 Negative     Bilirubin, UA 04/23/2022 Negative     Blood, UA 04/23/2022 Negative     UROBILINOGEN UA 04/23/2022 Negative     STREP A PCR 04/23/2022 Not Detected     RBC, UA 04/23/2022 None Seen     WBC, UA 04/23/2022 1-2     Epithelial Cells 04/23/2022 Occasional     Bacteria, UA 04/23/2022 Occasional     Ventricular Rate 04/23/2022 74     Atrial Rate 04/23/2022 74     OK Interval 04/23/2022 188     QRSD Interval 04/23/2022 88     QT Interval 04/23/2022 382     QTC Interval 04/23/2022 424     P Axis 04/23/2022 61     QRS Axis 04/23/2022 -5     T Wave [place] 04/23/2022 57    Admission on 03/20/2022, Discharged on 03/20/2022   Component Date Value    WBC 03/20/2022 15 58*    RBC 03/20/2022 5 26     Hemoglobin 03/20/2022 14 1     Hematocrit 03/20/2022 43 5     MCV 03/20/2022 83     MCH 03/20/2022 26 8     MCHC 03/20/2022 32 4     RDW 03/20/2022 13 7     MPV 03/20/2022 9 2     Platelets 72/74/6808 326     nRBC 03/20/2022 0     Neutrophils Relative 03/20/2022 72     Immat GRANS % 03/20/2022 1     Lymphocytes Relative 03/20/2022 17     Monocytes Relative 03/20/2022 7     Eosinophils Relative 03/20/2022 2     Basophils Relative 03/20/2022 1     Neutrophils Absolute 03/20/2022 11 42*    Immature Grans Absolute 03/20/2022 0 08     Lymphocytes Absolute 03/20/2022 2 61     Monocytes Absolute 03/20/2022 1 09     Eosinophils Absolute 03/20/2022 0 30     Basophils Absolute 03/20/2022 0 08     Sodium 03/20/2022 137     Potassium 03/20/2022 4 7     Chloride 03/20/2022 101     CO2 03/20/2022 28     ANION GAP 03/20/2022 8     BUN 03/20/2022 27*    Creatinine 03/20/2022 1 50*    Glucose 03/20/2022 100     Calcium 03/20/2022 9 3     AST 03/20/2022 9     ALT 03/20/2022 24     Alkaline Phosphatase 03/20/2022 79     Total Protein 03/20/2022 8 2     Albumin 03/20/2022 3 8     Total Bilirubin 03/20/2022 1 07*    eGFR 03/20/2022 53     Lipase 03/20/2022 126

## 2022-05-22 RX ORDER — PROMETHAZINE HYDROCHLORIDE 25 MG/ML
12.5 INJECTION, SOLUTION INTRAMUSCULAR; INTRAVENOUS ONCE AS NEEDED
Status: CANCELLED | OUTPATIENT
Start: 2022-05-22

## 2022-05-22 RX ORDER — SODIUM CHLORIDE 9 MG/ML
125 INJECTION, SOLUTION INTRAVENOUS CONTINUOUS
Status: CANCELLED | OUTPATIENT
Start: 2022-05-22

## 2022-05-22 RX ORDER — ONDANSETRON 2 MG/ML
4 INJECTION INTRAMUSCULAR; INTRAVENOUS ONCE AS NEEDED
Status: CANCELLED | OUTPATIENT
Start: 2022-05-22

## 2022-05-22 RX ORDER — ALBUTEROL SULFATE 2.5 MG/3ML
2.5 SOLUTION RESPIRATORY (INHALATION) ONCE AS NEEDED
Status: CANCELLED | OUTPATIENT
Start: 2022-05-22

## 2022-05-24 ENCOUNTER — HOSPITAL ENCOUNTER (OUTPATIENT)
Dept: GASTROENTEROLOGY | Facility: HOSPITAL | Age: 52
Setting detail: OUTPATIENT SURGERY
Discharge: HOME | End: 2022-05-24
Attending: INTERNAL MEDICINE | Admitting: INTERNAL MEDICINE
Payer: COMMERCIAL

## 2022-05-24 ENCOUNTER — ANESTHESIA EVENT (OUTPATIENT)
Dept: GASTROENTEROLOGY | Facility: HOSPITAL | Age: 52
End: 2022-05-24

## 2022-05-24 ENCOUNTER — ANESTHESIA (OUTPATIENT)
Dept: GASTROENTEROLOGY | Facility: HOSPITAL | Age: 52
End: 2022-05-24

## 2022-05-24 VITALS
TEMPERATURE: 97.3 F | HEART RATE: 82 BPM | DIASTOLIC BLOOD PRESSURE: 99 MMHG | OXYGEN SATURATION: 100 % | BODY MASS INDEX: 45.62 KG/M2 | HEIGHT: 69 IN | RESPIRATION RATE: 20 BRPM | WEIGHT: 308 LBS | SYSTOLIC BLOOD PRESSURE: 141 MMHG

## 2022-05-24 DIAGNOSIS — R07.89 ATYPICAL CHEST PAIN: ICD-10-CM

## 2022-05-24 DIAGNOSIS — R10.13 EPIGASTRIC PAIN: ICD-10-CM

## 2022-05-24 DIAGNOSIS — K20.0 EOSINOPHILIC ESOPHAGITIS: ICD-10-CM

## 2022-05-24 PROCEDURE — 88305 TISSUE EXAM BY PATHOLOGIST: CPT | Performed by: SPECIALIST

## 2022-05-24 PROCEDURE — 43239 EGD BIOPSY SINGLE/MULTIPLE: CPT | Performed by: INTERNAL MEDICINE

## 2022-05-24 RX ORDER — LIDOCAINE HYDROCHLORIDE 20 MG/ML
INJECTION, SOLUTION EPIDURAL; INFILTRATION; INTRACAUDAL; PERINEURAL AS NEEDED
Status: DISCONTINUED | OUTPATIENT
Start: 2022-05-24 | End: 2022-05-24

## 2022-05-24 RX ORDER — PROPOFOL 10 MG/ML
INJECTION, EMULSION INTRAVENOUS AS NEEDED
Status: DISCONTINUED | OUTPATIENT
Start: 2022-05-24 | End: 2022-05-24

## 2022-05-24 RX ORDER — SODIUM CHLORIDE 9 MG/ML
125 INJECTION, SOLUTION INTRAVENOUS CONTINUOUS
Status: DISCONTINUED | OUTPATIENT
Start: 2022-05-24 | End: 2022-05-28 | Stop reason: HOSPADM

## 2022-05-24 RX ADMIN — PROPOFOL 150 MG: 10 INJECTION, EMULSION INTRAVENOUS at 13:50

## 2022-05-24 RX ADMIN — LIDOCAINE HYDROCHLORIDE 100 MG: 20 INJECTION, SOLUTION EPIDURAL; INFILTRATION; INTRACAUDAL at 13:49

## 2022-05-24 RX ADMIN — PROPOFOL 50 MG: 10 INJECTION, EMULSION INTRAVENOUS at 13:52

## 2022-05-24 RX ADMIN — SODIUM CHLORIDE 125 ML/HR: 0.9 INJECTION, SOLUTION INTRAVENOUS at 13:35

## 2022-05-24 NOTE — ANESTHESIA PREPROCEDURE EVALUATION
Procedure:  EGD    Relevant Problems   CARDIO   (+) Essential hypertension   (+) Hyperlipidemia      GI/HEPATIC   (+) GERD (gastroesophageal reflux disease)      /RENAL   (+) Acute-on-chronic kidney injury ([address] )   (+) Renal cell carcinoma of right kidney (HCC)      PULMONARY   (+) Sleep apnea   (+) Smoking      Other   (+) Class 3 severe obesity due to excess calories with serious comorbidity in adult [facility])             Anesthesia Plan  ASA Score- 3     Anesthesia Type- general with ASA Monitors  Additional Monitors:   Airway Plan:           Plan Factors-    Chart reviewed  Patient summary reviewed  Induction-     Postoperative Plan-     Informed Consent- Anesthetic plan and risks discussed with patient

## 2022-05-24 NOTE — INTERVAL H&P NOTE
H&P reviewed  After examining the patient I find no changes in the patients condition since the H&P had been written      Vitals:    05/24/22 1319   BP: 128/98   Pulse: 83   Resp: 16   Temp: (!) 97 3 °F (36 3 °C)   SpO2: 99%

## 2022-05-24 NOTE — DISCHARGE INSTRUCTIONS
Please call [phone] with any problems  I did some biopsies of your stomach and esophagus and will go over that with the return to the office  No definitive cause of chest pain was seen

## 2022-05-24 NOTE — ANESTHESIA POSTPROCEDURE EVALUATION
Post-Op Assessment Note    CV Status:  Stable    Pain management: adequate     Mental Status:  Alert and awake   Hydration Status:  Euvolemic   PONV Controlled:  Controlled   Airway Patency:  Patent      Post Op Vitals Reviewed: Yes      Staff: Anesthesiologist         No complications documented      BP      Temp     Pulse     Resp      SpO2      /99   Pulse 82   Temp (!) 97 3 °F (36 3 °C) (Temporal)   Resp 20   Ht 5' 8 5" (1 74 m)   Wt (!) 140 kg (308 lb)   SpO2 100%   BMI 46 15 kg/m²

## 2022-06-07 ENCOUNTER — APPOINTMENT (EMERGENCY)
Dept: RADIOLOGY | Facility: HOSPITAL | Age: 52
End: 2022-06-07
Payer: COMMERCIAL

## 2022-06-07 ENCOUNTER — HOSPITAL ENCOUNTER (EMERGENCY)
Facility: HOSPITAL | Age: 52
Discharge: HOME | End: 2022-06-07
Attending: EMERGENCY MEDICINE | Admitting: EMERGENCY MEDICINE
Payer: COMMERCIAL

## 2022-06-07 VITALS
BODY MASS INDEX: 48.16 KG/M2 | DIASTOLIC BLOOD PRESSURE: 81 MMHG | WEIGHT: 315 LBS | TEMPERATURE: 99 F | HEART RATE: 77 BPM | OXYGEN SATURATION: 98 % | RESPIRATION RATE: 20 BRPM | SYSTOLIC BLOOD PRESSURE: 139 MMHG

## 2022-06-07 DIAGNOSIS — R89.9 ABNORMAL LABORATORY TEST RESULT: Primary | ICD-10-CM

## 2022-06-07 DIAGNOSIS — R07.89 ATYPICAL CHEST PAIN: ICD-10-CM

## 2022-06-07 LAB
ALBUMIN SERPL BCP-MCNC: 3.9 G/DL (ref 3.5–5)
ALP SERPL-CCNC: 81 U/L (ref 46–116)
ALT SERPL W P-5'-P-CCNC: 27 U/L (ref 12–78)
ANION GAP SERPL CALCULATED.3IONS-SCNC: 5 MMOL/L (ref 4–13)
AST SERPL W P-5'-P-CCNC: 19 U/L (ref 5–45)
ATRIAL RATE: 74 BPM
BASOPHILS # BLD AUTO: 0.05 THOUSANDS/ΜL (ref 0–0.1)
BASOPHILS NFR BLD AUTO: 0 % (ref 0–1)
BILIRUB SERPL-MCNC: 0.63 MG/DL (ref 0.2–1)
BUN SERPL-MCNC: 23 MG/DL (ref 5–25)
CALCIUM SERPL-MCNC: 9.4 MG/DL (ref 8.3–10.1)
CARDIAC TROPONIN I PNL SERPL HS: 3 NG/L
CHLORIDE SERPL-SCNC: 98 MMOL/L (ref 100–108)
CO2 SERPL-SCNC: 32 MMOL/L (ref 21–32)
CREAT SERPL-MCNC: 1.41 MG/DL (ref 0.6–1.3)
EOSINOPHIL # BLD AUTO: 0.33 THOUSAND/ΜL (ref 0–0.61)
EOSINOPHIL NFR BLD AUTO: 2 % (ref 0–6)
ERYTHROCYTE [DISTWIDTH] IN BLOOD BY AUTOMATED COUNT: 13.9 % (ref 11.6–15.1)
GFR SERPL CREATININE-BSD FRML MDRD: 57 ML/MIN/1.73SQ M
GLUCOSE SERPL-MCNC: 102 MG/DL (ref 65–140)
HCT VFR BLD AUTO: 46.2 % (ref 36.5–49.3)
HGB BLD-MCNC: 14.3 G/DL (ref 12–17)
IMM GRANULOCYTES # BLD AUTO: 0.07 THOUSAND/UL (ref 0–0.2)
IMM GRANULOCYTES NFR BLD AUTO: 1 % (ref 0–2)
LYMPHOCYTES # BLD AUTO: 2.37 THOUSANDS/ΜL (ref 0.6–4.47)
LYMPHOCYTES NFR BLD AUTO: 18 % (ref 14–44)
MCH RBC QN AUTO: 26.7 PG (ref 26.8–34.3)
MCHC RBC AUTO-ENTMCNC: 31 G/DL (ref 31.4–37.4)
MCV RBC AUTO: 86 FL (ref 82–98)
MONOCYTES # BLD AUTO: 1.06 THOUSAND/ΜL (ref 0.17–1.22)
MONOCYTES NFR BLD AUTO: 8 % (ref 4–12)
NEUTROPHILS # BLD AUTO: 9.59 THOUSANDS/ΜL (ref 1.85–7.62)
NEUTS SEG NFR BLD AUTO: 71 % (ref 43–75)
NRBC BLD AUTO-RTO: 0 /100 WBCS
P AXIS: 67 DEGREES
PLATELET # BLD AUTO: 305 THOUSANDS/UL (ref 149–390)
PMV BLD AUTO: 9.5 FL (ref 8.9–12.7)
POTASSIUM SERPL-SCNC: 4.6 MMOL/L (ref 3.5–5.3)
PR INTERVAL: 190 MS
PROT SERPL-MCNC: 8.3 G/DL (ref 6.4–8.2)
QRS AXIS: 30 DEGREES
QRSD INTERVAL: 84 MS
QT INTERVAL: 374 MS
QTC INTERVAL: 415 MS
RBC # BLD AUTO: 5.35 MILLION/UL (ref 3.88–5.62)
SODIUM SERPL-SCNC: 135 MMOL/L (ref 136–145)
T WAVE AXIS: 38 DEGREES
VENTRICULAR RATE: 74 BPM
WBC # BLD AUTO: 13.47 THOUSAND/UL (ref 4.31–10.16)

## 2022-06-07 PROCEDURE — 36415 COLL VENOUS BLD VENIPUNCTURE: CPT | Performed by: EMERGENCY MEDICINE

## 2022-06-07 PROCEDURE — 99285 EMERGENCY DEPT VISIT HI MDM: CPT | Performed by: EMERGENCY MEDICINE

## 2022-06-07 PROCEDURE — 93010 ELECTROCARDIOGRAM REPORT: CPT | Performed by: INTERNAL MEDICINE

## 2022-06-07 PROCEDURE — 99284 EMERGENCY DEPT VISIT MOD MDM: CPT

## 2022-06-07 PROCEDURE — 84484 ASSAY OF TROPONIN QUANT: CPT | Performed by: EMERGENCY MEDICINE

## 2022-06-07 PROCEDURE — 80053 COMPREHEN METABOLIC PANEL: CPT | Performed by: EMERGENCY MEDICINE

## 2022-06-07 PROCEDURE — 71045 X-RAY EXAM CHEST 1 VIEW: CPT

## 2022-06-07 PROCEDURE — 85025 COMPLETE CBC W/AUTO DIFF WBC: CPT | Performed by: EMERGENCY MEDICINE

## 2022-06-07 PROCEDURE — 93005 ELECTROCARDIOGRAM TRACING: CPT

## 2022-06-07 NOTE — ED PROVIDER NOTES
History  Chief Complaint   Patient presents with    Abnormal Lab     Pt reports for abnormal K of 6 4 - Pt reports having chestpain and not feeling like himself - pt describes the chestpain as a jolt      71-year-old male with history of renal cell carcinoma of the right kidney status post right nephrectomy presents to the emergency department for evaluation of an elevated potassium level on outpatient labs  Patient reports that he was having lab work done because he has CIS that are going to be removed from his left kidney  The patient states that he was called this afternoon and told that his lab work was abnormal and he should go to the emergency department for further evaluation  The patient states that he felt well throughout the day today and was at [facility]  He reports that when he arrived here at the emergency department he felt a twinge in his chest that has since resolved  Patient denies headache, blurry vision, shortness of breath, abdominal pain, fevers, chills, nausea, vomiting, diarrhea and localized numbness, tingling or weakness  Prior to Admission Medications   Prescriptions Last Dose Informant Patient Reported? Taking?    Azelastine HCl 137 MCG/SPRAY SOLN   Yes No   LORazepam (ATIVAN) 0 5 mg tablet   Yes No   ascorbic acid (VITAMIN C) 500 MG tablet   Yes No   Sig: Take 500 mg by mouth daily   aspirin 81 mg chewable tablet   Yes No   Sig: Chew   atorvastatin (LIPITOR) 40 mg tablet   Yes No   Sig: Take 40 mg by mouth daily   butalbital-acetaminophen-caffeine (FIORICET,ESGIC) -40 mg per tablet   No No   Sig: Take 1 tablet by mouth every 6 (six) hours as needed for headaches for up to 10 doses   carvedilol (COREG) 25 mg tablet   No No   Sig: Take 1 tablet (25 mg total) by mouth 2 (two) times a day with meals   cyclobenzaprine (FLEXERIL) 10 mg tablet   Yes No   Sig: Take 10 mg by mouth 3 (three) times a day as needed   famotidine (PEPCID) 20 mg tablet   Yes No   Sig: Take 20 mg by mouth in the morning   [facility] brand per pt     hydrocortisone (ANUSOL-HC) 25 mg suppository   No No   Sig: Insert 1 suppository (25 mg total) into the rectum 2 (two) times a day   hyoscyamine (ANASPAZ,LEVSIN) 0 125 MG tablet   No No   Sig: Take 1 tablet (0 125 mg total) by mouth every 4 (four) hours as needed for cramping   levocetirizine (XYZAL) 5 MG tablet   Yes No   Sig: Take 5 mg by mouth every morning   linaCLOtide (Linzess) 290 MCG CAPS   Yes No   Sig: Take 290 mcg by mouth daily   losartan-hydrochlorothiazide (HYZAAR) 100-12 5 MG per tablet   No No   Sig: Take 1 tablet by mouth daily Resume on 4/14   nortriptyline (PAMELOR) 50 mg capsule   Yes No   Sig: Take 50 mg by mouth daily at bedtime   pantoprazole (PROTONIX) 20 mg tablet   No No   Sig: Take 1 tablet (20 mg total) by mouth daily   penicillin V potassium (VEETID) 500 mg tablet   Yes No   Sig: Take 500 mg by mouth 4 (four) times a day   sildenafil (REVATIO) 20 mg tablet   Yes No   Sig: TAKE THREE TABLETS BY MOUTH ONE HOUR PRIOR AS DIRECTED   Patient not taking: Reported on 2/18/2022   spironolactone (ALDACTONE) 25 mg tablet   No No   Sig: Take 2 tablets (50 mg total) by mouth daily Resume on 4/14   topiramate (TOPAMAX) 25 mg tablet   Yes No   Sig: topiramate 25 mg tablet   take 1 tablet by mouth three times a day   triamcinolone (KENALOG) 0 1 % cream   Yes No   Sig: apply twice a day to rash ON face for 2 weeks then STOP      Facility-Administered Medications: None       Past Medical History:   Diagnosis Date    Eosinophilic esophagitis     History of kidney cancer     2007    Hyperlipidemia     Hypertension     Renal disorder     Sleep apnea        Past Surgical History:   Procedure Laterality Date    COLONOSCOPY  08/04/2015    Normal by Dr Natty Tao at [address] [place] [address]  12/16/2016    Diverticulosis otherwise normal by Dr Julio May    COLONOSCOPY  05/14/2021    normal by Dr Teresa Patel Renal cell carcinoma    UPPER GASTROINTESTINAL ENDOSCOPY  08/2020    Biopsy positive for eosinophilic esophagitis  Dr Azam Novak  Esophagus normal, empiric dilated with [address]  Negative H  pylori  Ref to ENT  Family History   Problem Relation Age of Onset    Lymphoma Mother      I have reviewed and agree with the history as documented  E-Cigarette/Vaping    E-Cigarette Use Never User      E-Cigarette/Vaping Substances     Social History     Tobacco Use    Smoking status: Former Smoker     Packs/day: 0 00    Smokeless tobacco: Never Used   Vaping Use    Vaping Use: Never used   Substance Use Topics    Alcohol use: Never    Drug use: Not Currently        Review of Systems   Constitutional: Negative for chills and fever  HENT: Negative for ear pain and sore throat  Eyes: Negative for pain and visual disturbance  Respiratory: Negative for cough and shortness of breath  Cardiovascular: Positive for chest pain  Negative for palpitations  Gastrointestinal: Negative for abdominal pain and vomiting  Genitourinary: Negative for dysuria and hematuria  Musculoskeletal: Negative for arthralgias and back pain  Skin: Negative for color change and rash  Neurological: Negative for seizures and syncope  All other systems reviewed and are negative  Physical Exam  ED Triage Vitals [06/07/22 1822]   Temperature Pulse Respirations Blood Pressure SpO2   99 °F (37 2 °C) 80 20 (!) 197/86 100 %      Temp Source Heart Rate Source Patient Position - Orthostatic VS BP Location FiO2 (%)   Oral Monitor Sitting Left arm --      Pain Score       8             Orthostatic Vital Signs  Vitals:    06/07/22 1822   BP: (!) 197/86   Pulse: 80   Patient Position - Orthostatic VS: Sitting       Physical Exam  Vitals and nursing note reviewed  Constitutional:       Appearance: He is well-developed  He is obese  HENT:      Head: Normocephalic and atraumatic     Eyes:      Conjunctiva/sclera: Conjunctivae normal    Cardiovascular:      Rate and Rhythm: Normal rate and regular rhythm  Pulses:           Radial pulses are 2+ on the right side and 2+ on the left side  Heart sounds: No murmur heard  Pulmonary:      Effort: Pulmonary effort is normal  No respiratory distress  Breath sounds: Normal breath sounds  Abdominal:      Palpations: Abdomen is soft  Tenderness: There is no abdominal tenderness  Musculoskeletal:      Cervical back: Neck supple  Skin:     General: Skin is warm and dry  Neurological:      Mental Status: He is alert           ED Medications  Medications - No data to display    Diagnostic Studies  Results Reviewed     Procedure Component Value Units Date/Time    HS Troponin I 2hr [[id number]]     Lab Status: No result Specimen: Blood     HS Troponin 0hr (reflex protocol) [[id number]]  (Normal) Collected: 06/07/22 1846    Lab Status: Final result Specimen: Blood from Arm, Right Updated: 06/07/22 1919     hs TnI 0hr 3 ng/L     Comprehensive metabolic panel [[id number]]  (Abnormal) Collected: 06/07/22 1846    Lab Status: Final result Specimen: Blood from Arm, Right Updated: 06/07/22 1917     Sodium 135 mmol/L      Potassium 4 6 mmol/L      Chloride 98 mmol/L      CO2 32 mmol/L      ANION GAP 5 mmol/L      BUN 23 mg/dL      Creatinine 1 41 mg/dL      Glucose 102 mg/dL      Calcium 9 4 mg/dL      AST 19 U/L      ALT 27 U/L      Alkaline Phosphatase 81 U/L      Total Protein 8 3 g/dL      Albumin 3 9 g/dL      Total Bilirubin 0 63 mg/dL      eGFR 57 ml/min/1 73sq m     Narrative:      Fay guidelines for Chronic Kidney Disease (CKD):     Stage 1 with normal or high GFR (GFR > 90 mL/min/1 73 square meters)    Stage 2 Mild CKD (GFR = 60-89 mL/min/1 73 square meters)    Stage 3A Moderate CKD (GFR = 45-59 mL/min/1 73 square meters)    Stage 3B Moderate CKD (GFR = 30-44 mL/min/1 73 square meters)    Stage 4 Severe CKD (GFR = 15-29 mL/min/1 73 square meters)    Stage 5 End Stage CKD (GFR <15 mL/min/1 73 square meters)  Note: GFR calculation is accurate only with a steady state creatinine    CBC and differential [[id number]]  (Abnormal) Collected: 06/07/22 1846    Lab Status: Final result Specimen: Blood from Arm, Right Updated: 06/07/22 1854     WBC 13 47 Thousand/uL      RBC 5 35 Million/uL      Hemoglobin 14 3 g/dL      Hematocrit 46 2 %      MCV 86 fL      MCH 26 7 pg      MCHC 31 0 g/dL      RDW 13 9 %      MPV 9 5 fL      Platelets 505 Thousands/uL      nRBC 0 /100 WBCs      Neutrophils Relative 71 %      Immat GRANS % 1 %      Lymphocytes Relative 18 %      Monocytes Relative 8 %      Eosinophils Relative 2 %      Basophils Relative 0 %      Neutrophils Absolute 9 59 Thousands/µL      Immature Grans Absolute 0 07 Thousand/uL      Lymphocytes Absolute 2 37 Thousands/µL      Monocytes Absolute 1 06 Thousand/µL      Eosinophils Absolute 0 33 Thousand/µL      Basophils Absolute 0 05 Thousands/µL                  XR chest 1 view portable   Final Result by Teodoro Gordon MD (06/07 1928)      No acute cardiopulmonary disease                    Workstation performed: [id number]               Procedures  ECG 12 Lead Documentation Only    Date/Time: 6/7/2022 7:09 PM  Performed by: Angela Buchanan MD  Authorized by: Angela Buchanan MD     ECG reviewed by me, the ED Provider: yes    Patient location:  ED  Previous ECG:     Previous ECG:  Compared to current    Similarity:  No change    Comparison to cardiac monitor: Yes    Interpretation:     Interpretation: normal    Rate:     ECG rate assessment: normal    Rhythm:     Rhythm: sinus rhythm    Ectopy:     Ectopy: none    QRS:     QRS axis:  Normal  Conduction:     Conduction: normal    ST segments:     ST segments:  Normal  T waves:     T waves: normal            ED Course  ED Course as of 06/07/22 1932 Tue Jun 07, 2022 1918 Potassium: 4 6             HEART Risk Score    Flowsheet Row Most Recent Value   Heart Score Risk Calculator    History 0 Filed at: 06/07/2022 1930   ECG 0 Filed at: 06/07/2022 1930   Age 1 Filed at: 06/07/2022 1930   Risk Factors 1 Filed at: 06/07/2022 1930   Troponin 0 Filed at: 06/07/2022 1930   HEART Score 2 Filed at: 06/07/2022 1930                                [facility]  Number of Diagnoses or Management Options  Abnormal laboratory test result  Atypical chest pain  Diagnosis management comments: 25-year-old male presented to the emergency department for evaluation of an abnormal outpatient lab  On arrival the patient was awake, alert, oriented and in no acute distress  Initial vital signs show that the patient was hypertensive but otherwise vital signs were stable  Physical exam was unremarkable  Workup done in the emergency department showed the patient had a potassium level that was within normal limits  EKG with no ischemic changes  Troponin within normal limits  Chest x-ray with no acute cardiopulmonary process  All diagnostic studies were discussed with the patient in detail  He is appropriate for discharge at this time with recommendation to follow up with his PCP  Return precautions were discussed  Patient agrees with the plan for discharge and feels comfortable to go home with proper f/u  Advised to return for worsening or additional problems  Diagnostic tests were reviewed and questions answered  Diagnosis, care plan and treatment options were discussed  The patient understands instructions and will follow up as directed          Disposition  Final diagnoses:   Abnormal laboratory test result   Atypical chest pain     Time reflects when diagnosis was documented in both MDM as applicable and the Disposition within this note     Time User Action Codes Description Comment    6/7/2022  7:30 PM jR Douglass [R89 9] Abnormal laboratory test result     6/7/2022  7:31 PM Rj Casillas Add [R07 89] Atypical chest pain       ED Disposition     ED Disposition   Discharge    Condition   Stable    Date/Time   Tue Jun 7, 2022  7:31 PM    Comment   Lisa Hyde discharge to home/self care  Follow-up Information     Follow up With Specialties Details Why Contact Info Additional Information    Masha Pedroza MD Family Medicine Schedule an appointment as soon as possible for a visit   [address] [phone]  [place] [address] Emergency Department Emergency Medicine Go to  If symptoms worsen [facility] [id number]  [address] [place] [address] Emergency Department, [address] [place] [address], [facility]          Patient's Medications   Discharge Prescriptions    No medications on file     No discharge procedures on file  PDMP Review     None           ED Provider  Attending physically available and evaluated Lisa Mary Ellen BATES managed the patient along with the ED Attending      Electronically Signed by         Tatiana Trinidad MD  06/07/22 1840

## 2022-06-07 NOTE — ED ATTENDING ATTESTATION
6/7/2022  Latisha BATES, saw and evaluated the patient  I have discussed the patient with the resident/non-physician practitioner and agree with the resident's/non-physician practitioner's findings, Plan of Care, and MDM as documented in the resident's/non-physician practitioner's note, except where noted  All available labs and Radiology studies were reviewed  I was present for key portions of any procedure(s) performed by the resident/non-physician practitioner and I was immediately available to provide assistance  At this point I agree with the current assessment done in the Emergency Department  I have conducted an independent evaluation of this patient a history and physical is as follows:      A 77-year-old male with past medical history of renal cell carcinoma s/p right nephrectomy, hypertension, hyperlipidemia and sleep apnea; presents after having abnormal outpatient lab work  Patient states he underwent labs today as part of a preop clearance  Patient was called by his physician that he needed to come to the ED for evaluation as his potassium was elevated  Currently patient offers no complaints  Denies history of hyperkalemia in the past   Patient otherwise denies fever, chills, chest pain, shortness of breath, abdominal pain, nausea, vomiting, diarrhea, peripheral edema and rashes      Physical Exam  General Appearance: alert and oriented, nad, non toxic appearing  Skin:  Warm, dry, intact  HEENT: atraumatic, normocephalic  Neck: Supple, trachea midline  Cardiac: RRR; no murmurs, rub, gallops  Pulmonary: lungs CTAB; no wheezes, rales, rhonchi  Gastrointestinal: abdomen soft, nontender, nondistended; no guarding or rebound tenderness; good bowel sounds, no mass or bruits  Extremities:  no pedal edema, 2+ pulses; no calf tenderness, no clubbing, no cyanosis  Neuro:  no focal motor or sensory deficits, CN 2-12 grossly intact  Psych:  Normal mood and affect, normal judgement and insight    Assessment and Plan:  Abnormal outpatient labs, on review potassium 6 4 which was reportedly not hemolyzed  Patient otherwise resting comfortably, asymptomatic  EKG reviewed and within normal limits  Will recheck labs for accuracy, if persistently hyperkalemic will treat accordingly      ED Course         Critical Care Time  Procedures

## 2022-07-25 ENCOUNTER — APPOINTMENT (EMERGENCY)
Dept: CT IMAGING | Facility: HOSPITAL | Age: 52
End: 2022-07-25
Payer: COMMERCIAL

## 2022-07-25 ENCOUNTER — HOSPITAL ENCOUNTER (EMERGENCY)
Facility: HOSPITAL | Age: 52
Discharge: HOME | End: 2022-07-25
Attending: EMERGENCY MEDICINE
Payer: COMMERCIAL

## 2022-07-25 VITALS
BODY MASS INDEX: 48.96 KG/M2 | SYSTOLIC BLOOD PRESSURE: 135 MMHG | DIASTOLIC BLOOD PRESSURE: 78 MMHG | OXYGEN SATURATION: 95 % | RESPIRATION RATE: 18 BRPM | TEMPERATURE: 97.7 F | WEIGHT: 315 LBS | HEART RATE: 95 BPM
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
## 2022-07-25 DIAGNOSIS — S41.112A LACERATION OF LEFT UPPER EXTREMITY, INITIAL ENCOUNTER: ICD-10-CM

## 2022-07-25 PROCEDURE — 12001 RPR S/N/AX/GEN/TRNK 2.5CM/<: CPT | Performed by: PHYSICIAN ASSISTANT

## 2022-07-25 PROCEDURE — 99282 EMERGENCY DEPT VISIT SF MDM: CPT | Performed by: PHYSICIAN ASSISTANT

## 2022-07-25 PROCEDURE — 90715 TDAP VACCINE 7 YRS/> IM: CPT | Performed by: PHYSICIAN ASSISTANT

## 2022-07-25 PROCEDURE — G1004 CDSM NDSC: HCPCS

## 2022-07-25 PROCEDURE — 99283 EMERGENCY DEPT VISIT LOW MDM: CPT

## 2022-07-25 PROCEDURE — 70450 CT HEAD/BRAIN W/O DYE: CPT

## 2022-07-25 PROCEDURE — 90471 IMMUNIZATION ADMIN: CPT

## 2022-07-25 RX ORDER — LIDOCAINE HYDROCHLORIDE 10 MG/ML
5 INJECTION, SOLUTION EPIDURAL; INFILTRATION; INTRACAUDAL; PERINEURAL ONCE
Status: COMPLETED | OUTPATIENT
Start: 2022-07-25 | End: 2022-07-25

## 2022-07-25 RX ADMIN — TETANUS TOXOID, REDUCED DIPHTHERIA TOXOID AND ACELLULAR PERTUSSIS VACCINE, ADSORBED 0.5 ML: 5; 2.5; 8; 8; 2.5 SUSPENSION INTRAMUSCULAR at 12:48

## 2022-07-25 RX ADMIN — LIDOCAINE HYDROCHLORIDE 5 ML: 10 INJECTION, SOLUTION EPIDURAL; INFILTRATION; INTRACAUDAL at 12:49

## 2022-07-25 NOTE — ED PROVIDER NOTES
History  Chief Complaint   Patient presents with    Extremity Laceration     Patient reports laceration to the back of the left arm, approximately 1" in length  Unknown last tetanus  Patient presents to the ER for evaluation of a left arm laceration  Patient states that just PTA he was walking down the steps when he slipped and cut his arm on the banister  Patient states that he caught himself and did not hit his head  Patient states that he does take a 81 mg aspirin daily  Patient denies any other injury incident  Patient noted a very mild headache initially however states that it has resolved  Patient denies any fevers, dizziness, syncope, loss conscious, neck pain, numbness, tingling, weakness, abdominal pain, chest pain, shortness of breath, or any other concerning symptoms  Patient unsure of last tetanus shot  Prior to Admission Medications   Prescriptions Last Dose Informant Patient Reported? Taking? Azelastine HCl 137 MCG/SPRAY SOLN   Yes No   LORazepam (ATIVAN) 0 5 mg tablet   Yes No   ascorbic acid (VITAMIN C) 500 MG tablet   Yes No   Sig: Take 500 mg by mouth daily   aspirin 81 mg chewable tablet   Yes No   Sig: Chew   atorvastatin (LIPITOR) 40 mg tablet   Yes No   Sig: Take 40 mg by mouth daily   butalbital-acetaminophen-caffeine (FIORICET,ESGIC) -40 mg per tablet   No No   Sig: Take 1 tablet by mouth every 6 (six) hours as needed for headaches for up to 10 doses   carvedilol (COREG) 25 mg tablet   No No   Sig: Take 1 tablet (25 mg total) by mouth 2 (two) times a day with meals   cyclobenzaprine (FLEXERIL) 10 mg tablet   Yes No   Sig: Take 10 mg by mouth 3 (three) times a day as needed   famotidine (PEPCID) 20 mg tablet   Yes No   Sig: Take 20 mg by mouth in the morning   [facility] brand per pt     hydrocortisone (ANUSOL-HC) 25 mg suppository   No No   Sig: Insert 1 suppository (25 mg total) into the rectum 2 (two) times a day   hyoscyamine (ANASPAZ,LEVSIN) 0 125 MG tablet   No No   Sig: Take 1 tablet (0 125 mg total) by mouth every 4 (four) hours as needed for cramping   levocetirizine (XYZAL) 5 MG tablet   Yes No   Sig: Take 5 mg by mouth every morning   linaCLOtide (Linzess) 290 MCG CAPS   Yes No   Sig: Take 290 mcg by mouth daily   losartan-hydrochlorothiazide (HYZAAR) 100-12 5 MG per tablet   No No   Sig: Take 1 tablet by mouth daily Resume on 4/14   nortriptyline (PAMELOR) 50 mg capsule   Yes No   Sig: Take 50 mg by mouth daily at bedtime   pantoprazole (PROTONIX) 20 mg tablet   No No   Sig: Take 1 tablet (20 mg total) by mouth daily   penicillin V potassium (VEETID) 500 mg tablet   Yes No   Sig: Take 500 mg by mouth 4 (four) times a day   sildenafil (REVATIO) 20 mg tablet   Yes No   Sig: TAKE THREE TABLETS BY MOUTH ONE HOUR PRIOR AS DIRECTED   Patient not taking: Reported on 2/18/2022   spironolactone (ALDACTONE) 25 mg tablet   No No   Sig: Take 2 tablets (50 mg total) by mouth daily Resume on 4/14   topiramate (TOPAMAX) 25 mg tablet   Yes No   Sig: topiramate 25 mg tablet   take 1 tablet by mouth three times a day   triamcinolone (KENALOG) 0 1 % cream   Yes No   Sig: apply twice a day to rash ON face for 2 weeks then STOP      Facility-Administered Medications: None       Past Medical History:   Diagnosis Date    Eosinophilic esophagitis     History of kidney cancer     2007    Hyperlipidemia     Hypertension     Renal disorder     Sleep apnea        Past Surgical History:   Procedure Laterality Date    COLONOSCOPY  08/04/2015    Normal by Dr Tre Londono at [address] [place] [address]  12/16/2016    Diverticulosis otherwise normal by Dr Tre Londono, [address] [place] [address] COLONOSCOPY  05/14/2021    normal by Dr Chen Both Right     Renal cell carcinoma    UPPER GASTROINTESTINAL ENDOSCOPY  08/2020    Biopsy positive for eosinophilic esophagitis  Dr Sudha Holbrook  Esophagus normal, empiric dilated with [address]  Negative H  pylori  Ref to ENT         Family History   Problem Relation Age of Onset    Lymphoma Mother      I have reviewed and agree with the history as documented  E-Cigarette/Vaping    E-Cigarette Use Never User      E-Cigarette/Vaping Substances     Social History     Tobacco Use    Smoking status: Former Smoker     Packs/day: 0 00    Smokeless tobacco: Never Used   Vaping Use    Vaping Use: Never used   Substance Use Topics    Alcohol use: Never    Drug use: Not Currently       Review of Systems   Constitutional: Negative for chills and fever  HENT: Negative for congestion and sore throat  Respiratory: Negative for cough and shortness of breath  Cardiovascular: Negative for chest pain  Gastrointestinal: Negative for abdominal pain, diarrhea, nausea and vomiting  Genitourinary: Negative for dysuria  Musculoskeletal: Negative for back pain  Skin: Negative for rash  Neurological: Negative for weakness and light-headedness  All other systems reviewed and are negative  Physical Exam  Physical Exam  Constitutional:       General: He is not in acute distress  Appearance: He is well-developed  HENT:      Head: Normocephalic and atraumatic  Ears:      Comments: No facial asymmetry  Nose: Nose normal    Eyes:      Extraocular Movements: Extraocular movements intact  Conjunctiva/sclera: Conjunctivae normal       Comments: Right pupil slightly larger than left  Reactive to light bilaterally  Neck:      Comments: Normal range of motion with no pain  Cardiovascular:      Rate and Rhythm: Normal rate  Pulmonary:      Effort: Pulmonary effort is normal    Abdominal:      Palpations: Abdomen is soft  Tenderness: There is no abdominal tenderness  There is no guarding  Musculoskeletal:         General: Normal range of motion  Cervical back: Normal range of motion  No tenderness  Comments: 5/5 strength of upper and lower extremities bilaterally  Skin:     General: Skin is warm        Capillary Refill: Capillary refill takes less than 2 seconds  Comments: 2 cm laceration to dorsal left upper arm  Bleeding controlled  No foreign body  Neurological:      Mental Status: He is alert and oriented to person, place, and time  Sensory: No sensory deficit  Comments: GCS:  15/15  No pronator drift  Normal finger-to-nose  Psychiatric:         Mood and Affect: Mood normal          Behavior: Behavior normal          Thought Content: Thought content normal          Judgment: Judgment normal          Vital Signs  ED Triage Vitals [07/25/22 1204]   Temperature Pulse Respirations Blood Pressure SpO2   97 7 °F (36 5 °C) 95 18 135/78 95 %      Temp Source Heart Rate Source Patient Position - Orthostatic VS BP Location FiO2 (%)   Oral Monitor Sitting Right arm --      Pain Score       5           Vitals:    07/25/22 1204   BP: 135/78   Pulse: 95   Patient Position - Orthostatic VS: Sitting         Visual Acuity      ED Medications  Medications   tetanus-diphtheria-acellular pertussis (BOOSTRIX) IM injection 0 5 mL (0 5 mL Intramuscular Given 7/25/22 1248)   lidocaine (PF) (XYLOCAINE-MPF) 1 % injection 5 mL (5 mL Infiltration Given by Other 7/25/22 1249)       Diagnostic Studies  Results Reviewed     None                 CT head without contrast   Final Result by Nickie Alvarez MD (07/25 2258)      No acute intracranial CT abnormality  Workstation performed: [id number]                    Procedures  Laceration repair    Date/Time: 7/25/2022 2:32 PM  Performed by: Annette Monsivais PA-C  Authorized by: Annette Monsivais PA-C   Consent: Verbal consent obtained    Consent given by: patient  Patient understanding: patient states understanding of the procedure being performed  Patient identity confirmed: verbally with patient  Body area: upper extremity  Location details: left upper arm  Laceration length: 2 cm  Foreign bodies: no foreign bodies  Tendon involvement: none  Nerve involvement: none  Anesthesia: local infiltration    Anesthesia:  Local Anesthetic: lidocaine 1% without epinephrine      Procedure Details:  Irrigation solution: saline  Amount of cleaning: standard  Debridement: none  Degree of undermining: none  Skin closure: 4-0 nylon  Number of sutures: 5  Technique: simple  Approximation: close  Approximation difficulty: simple  Dressing: gauze roll  Patient tolerance: patient tolerated the procedure well with no immediate complications               ED Course  ED Course as of 07/25/22 2004 Mon Jul 25, 2022   1223 Blood Pressure: 135/78   1223 Temperature: 97 7 °F (36 5 °C)   1223 Pulse: 95   1223 Respirations: 18   1223 SpO2: 95 %   1436 CT head without contrast  IMPRESSION:     No acute intracranial CT abnormality  MDM     Patient well appearing and nontoxic in the ER  CT head negative  Reviewed slightly larger pupil with attending who also evaluated patient  Patient with no other focal neuro deficits  Discussed with attending recommends follow-up with ophthalmology in next couple days  Reviewed patient was agreeable  Patient asymptomatic at this time  Discussed strict return instructions  Patient's wound was cleaned and thoroughly washed out  Five stitches were placed in the ER  Patient's tetanus was up-to-date  Discussed with patient follow-up in 1 week for suture removal   Discussed symptomatic treatment and strict return instructions  Patient in no acute distress throughout ER stay  Vitals stable and reassuring  Patient stable for discharge at this time  Reviewed plan with patient/family  Reviewed red flag symptoms and strict return instructions  Patient/family voiced understanding and agreement to plan  Patient/family had opportunity to ask questions and all questions were answered at bedside        Disposition  Final diagnoses:   Fall, initial encounter   Laceration of left upper extremity, initial encounter Time reflects when diagnosis was documented in both MDM as applicable and the Disposition within this note     Time User Action Codes Description Comment    7/25/2022  3:17 PM Kimberli Lovelace Add [W42  BVKS] Fall, initial encounter     7/25/2022  3:18 PM Kimberli Lovelace Add [S16 [id number]] Laceration of left upper extremity, initial encounter       ED Disposition     ED Disposition   Discharge    Condition   Stable    Date/Time   Mon Jul 25, 2022  3:17 PM    [address] discharge to home/self care                 Follow-up Information     Follow up With Specialties Details Why [address] [facility] Emergency Department Emergency Medicine  If symptoms worsen [facility] [id number]  [address] [facility] Emergency Department, [address] [facility] , [place], [place], [facility]  Schedule an appointment as soon as possible for a visit in 1 day Call to schedule appointment this week [phone]           Discharge Medication List as of 7/25/2022  3:23 PM      CONTINUE these medications which have NOT CHANGED    Details   ascorbic acid (VITAMIN C) 500 MG tablet Take 500 mg by mouth daily, Historical Med      aspirin 81 mg chewable tablet Chew, Historical Med      atorvastatin (LIPITOR) 40 mg tablet Take 40 mg by mouth daily, Historical Med      Azelastine HCl 137 MCG/SPRAY SOLN Starting Wed 5/4/2022, Historical Med      butalbital-acetaminophen-caffeine (FIORICET,ESGIC) -40 mg per tablet Take 1 tablet by mouth every 6 (six) hours as needed for headaches for up to 10 doses, Starting Fri 9/17/2021, Normal      carvedilol (COREG) 25 mg tablet Take 1 tablet (25 mg total) by mouth 2 (two) times a day with meals, Starting Mon 4/12/2021, Until Tue 5/24/2022, Normal      cyclobenzaprine (FLEXERIL) 10 mg tablet Take 10 mg by mouth 3 (three) times a day as needed, Historical Med      famotidine (PEPCID) 20 mg tablet Take 20 mg by mouth in the morning  [facility] brand per pt  , Historical Med      hydrocortisone (ANUSOL-HC) 25 mg suppository Insert 1 suppository (25 mg total) into the rectum 2 (two) times a day, Starting Tue 6/7/2022, Normal      hyoscyamine (ANASPAZ,LEVSIN) 0 125 MG tablet Take 1 tablet (0 125 mg total) by mouth every 4 (four) hours as needed for cramping, Starting Mon 8/16/2021, Normal      levocetirizine (XYZAL) 5 MG tablet Take 5 mg by mouth every morning, Starting Wed 7/29/2020, Historical Med      linaCLOtide (Linzess) 290 MCG CAPS Take 290 mcg by mouth daily, Historical Med      LORazepam (ATIVAN) 0 5 mg tablet Starting Tue 8/25/2020, Historical Med      losartan-hydrochlorothiazide (HYZAAR) 100-12 5 MG per tablet Take 1 tablet by mouth daily Resume on 4/14, Starting Mon 4/12/2021, No Print      nortriptyline (PAMELOR) 50 mg capsule Take 50 mg by mouth daily at bedtime, Starting Mon 7/12/2021, Historical Med      pantoprazole (PROTONIX) 20 mg tablet Take 1 tablet (20 mg total) by mouth daily, Starting Thu 5/27/2021, Until Fri 2/18/2022, Normal      penicillin V potassium (VEETID) 500 mg tablet Take 500 mg by mouth 4 (four) times a day, Starting Tue 5/3/2022, Historical Med      sildenafil (REVATIO) 20 mg tablet TAKE THREE TABLETS BY MOUTH ONE HOUR PRIOR AS DIRECTED, Historical Med      spironolactone (ALDACTONE) 25 mg tablet Take 2 tablets (50 mg total) by mouth daily Resume on 4/14, Starting Mon 4/12/2021, No Print      topiramate (TOPAMAX) 25 mg tablet topiramate 25 mg tablet   take 1 tablet by mouth three times a day, Historical Med      triamcinolone (KENALOG) 0 1 % cream apply twice a day to rash ON face for 2 weeks then STOP, Historical Med             No discharge procedures on file      PDMP Review     None          ED Provider  Electronically Signed by           Maria Elena Joshua PA-C  07/25/22 2006

## 2022-07-25 NOTE — ED ATTENDING ATTESTATION
7/25/2022  IIsrael DO, saw and evaluated the patient  I have discussed the patient with the resident/non-physician practitioner and agree with the resident's/non-physician practitioner's findings, Plan of Care, and MDM as documented in the resident's/non-physician practitioner's note, except where noted  All available labs and Radiology studies were reviewed  I was present for key portions of any procedure(s) performed by the resident/non-physician practitioner and I was immediately available to provide assistance  At this point I agree with the current assessment done in the Emergency Department  I have conducted an independent evaluation of this patient a history and physical is as follows:    ED Course         Critical Care Time  Procedures    asked to see this patient secondary to unequal pupil size  Patient denies head trauma  He does complain of mild headache  He was unaware of difference in pupil size  He states he has been seeing some black spots in his right field of vision  On exam he is awake and alert no acute distress  Pupils are reactive to light with the right pupil being slightly larger than the left  Extraocular muscles are intact  Normal neurologic exam with no focal deficits  Suspect his aniscoria is benign but since this is new would have him follow with his ophthalmologist in the next 1 to 2 days

## 2022-07-25 NOTE — DISCHARGE INSTRUCTIONS
Return to the ER with any new or worsening of symptoms such as but not limited to headaches, dizziness, change in mental status, vomiting, numbness, tingling, weakness, or any other concerning symptoms    Watch for any signs of infection such as but not limited to redness, warmth, swelling surrounding the area, drainage from wound, red streaks extending from wound, fevers, or any other concerning symptoms  Return to the ER immediately if any of these develop  Thank you for allowing us to be part of your care today

## 2022-10-21 ENCOUNTER — HOSPITAL ENCOUNTER (EMERGENCY)
Facility: HOSPITAL | Age: 52
Discharge: HOME | End: 2022-10-21
Attending: EMERGENCY MEDICINE
Payer: COMMERCIAL

## 2022-10-21 ENCOUNTER — APPOINTMENT (EMERGENCY)
Dept: CT IMAGING | Facility: HOSPITAL | Age: 52
End: 2022-10-21
Payer: COMMERCIAL

## 2022-10-21 VITALS
OXYGEN SATURATION: 95 % | WEIGHT: 315 LBS | BODY MASS INDEX: 48.76 KG/M2 | RESPIRATION RATE: 17 BRPM | TEMPERATURE: 97.7 F | HEART RATE: 74 BPM | SYSTOLIC BLOOD PRESSURE: 141 MMHG | DIASTOLIC BLOOD PRESSURE: 66 MMHG

## 2022-10-21 DIAGNOSIS — N17.9 AKI (ACUTE KIDNEY INJURY) (HCC): ICD-10-CM

## 2022-10-21 DIAGNOSIS — R10.9 FLANK PAIN: Primary | ICD-10-CM

## 2022-10-21 DIAGNOSIS — M54.9 BACK PAIN: ICD-10-CM

## 2022-10-21 LAB
ALBUMIN SERPL BCP-MCNC: 3.9 G/DL (ref 3.5–5)
ALP SERPL-CCNC: 81 U/L (ref 46–116)
ALT SERPL W P-5'-P-CCNC: 27 U/L (ref 12–78)
ANION GAP SERPL CALCULATED.3IONS-SCNC: 10 MMOL/L (ref 4–13)
AST SERPL W P-5'-P-CCNC: 12 U/L (ref 5–45)
BACTERIA UR QL AUTO: ABNORMAL /HPF
BASOPHILS # BLD AUTO: 0.04 THOUSANDS/ÂΜL (ref 0–0.1)
BASOPHILS NFR BLD AUTO: 0 % (ref 0–1)
BILIRUB SERPL-MCNC: 0.92 MG/DL (ref 0.2–1)
BILIRUB UR QL STRIP: NEGATIVE
BUN SERPL-MCNC: 24 MG/DL (ref 5–25)
CALCIUM SERPL-MCNC: 10 MG/DL (ref 8.3–10.1)
CARDIAC TROPONIN I PNL SERPL HS: 4 NG/L
CHLORIDE SERPL-SCNC: 97 MMOL/L (ref 96–108)
CLARITY UR: CLEAR
CO2 SERPL-SCNC: 29 MMOL/L (ref 21–32)
COLOR UR: YELLOW
CREAT SERPL-MCNC: 1.61 MG/DL (ref 0.6–1.3)
EOSINOPHIL # BLD AUTO: 0.11 THOUSAND/ÂΜL (ref 0–0.61)
EOSINOPHIL NFR BLD AUTO: 1 % (ref 0–6)
ERYTHROCYTE [DISTWIDTH] IN BLOOD BY AUTOMATED COUNT: 13.5 % (ref 11.6–15.1)
GFR SERPL CREATININE-BSD FRML MDRD: 48 ML/MIN/1.73SQ M
GLUCOSE SERPL-MCNC: 131 MG/DL (ref 65–140)
GLUCOSE UR STRIP-MCNC: NEGATIVE MG/DL
HCT VFR BLD AUTO: 43.5 % (ref 36.5–49.3)
HGB BLD-MCNC: 13.8 G/DL (ref 12–17)
HGB UR QL STRIP.AUTO: NEGATIVE
IMM GRANULOCYTES # BLD AUTO: 0.12 THOUSAND/UL (ref 0–0.2)
IMM GRANULOCYTES NFR BLD AUTO: 1 % (ref 0–2)
KETONES UR STRIP-MCNC: NEGATIVE MG/DL
LEUKOCYTE ESTERASE UR QL STRIP: NEGATIVE
LIPASE SERPL-CCNC: 139 U/L (ref 73–393)
LYMPHOCYTES # BLD AUTO: 1.73 THOUSANDS/ÂΜL (ref 0.6–4.47)
LYMPHOCYTES NFR BLD AUTO: 13 % (ref 14–44)
MCH RBC QN AUTO: 27.3 PG (ref 26.8–34.3)
MCHC RBC AUTO-ENTMCNC: 31.7 G/DL (ref 31.4–37.4)
MCV RBC AUTO: 86 FL (ref 82–98)
MONOCYTES # BLD AUTO: 0.97 THOUSAND/ÂΜL (ref 0.17–1.22)
MONOCYTES NFR BLD AUTO: 7 % (ref 4–12)
NEUTROPHILS # BLD AUTO: 10.15 THOUSANDS/ÂΜL (ref 1.85–7.62)
NEUTS SEG NFR BLD AUTO: 78 % (ref 43–75)
NITRITE UR QL STRIP: NEGATIVE
NON-SQ EPI CELLS URNS QL MICRO: ABNORMAL /HPF
NRBC BLD AUTO-RTO: 0 /100 WBCS
PH UR STRIP.AUTO: 5.5 [PH] (ref 4.5–8)
PLATELET # BLD AUTO: 338 THOUSANDS/UL (ref 149–390)
PMV BLD AUTO: 9.7 FL (ref 8.9–12.7)
POTASSIUM SERPL-SCNC: 4.5 MMOL/L (ref 3.5–5.3)
PROT SERPL-MCNC: 9 G/DL (ref 6.4–8.4)
PROT UR STRIP-MCNC: ABNORMAL MG/DL
RBC # BLD AUTO: 5.06 MILLION/UL (ref 3.88–5.62)
RBC #/AREA URNS AUTO: ABNORMAL /HPF
SODIUM SERPL-SCNC: 136 MMOL/L (ref 135–147)
SP GR UR STRIP.AUTO: 1.02 (ref 1–1.03)
UROBILINOGEN UR QL STRIP.AUTO: 0.2 E.U./DL
WBC # BLD AUTO: 13.12 THOUSAND/UL (ref 4.31–10.16)
WBC #/AREA URNS AUTO: ABNORMAL /HPF

## 2022-10-21 PROCEDURE — 74176 CT ABD & PELVIS W/O CONTRAST: CPT

## 2022-10-21 PROCEDURE — 81001 URINALYSIS AUTO W/SCOPE: CPT

## 2022-10-21 PROCEDURE — 83690 ASSAY OF LIPASE: CPT | Performed by: PHYSICIAN ASSISTANT

## 2022-10-21 PROCEDURE — 36415 COLL VENOUS BLD VENIPUNCTURE: CPT | Performed by: PHYSICIAN ASSISTANT

## 2022-10-21 PROCEDURE — 84484 ASSAY OF TROPONIN QUANT: CPT | Performed by: PHYSICIAN ASSISTANT

## 2022-10-21 PROCEDURE — 80053 COMPREHEN METABOLIC PANEL: CPT | Performed by: PHYSICIAN ASSISTANT

## 2022-10-21 PROCEDURE — 99285 EMERGENCY DEPT VISIT HI MDM: CPT | Performed by: PHYSICIAN ASSISTANT

## 2022-10-21 PROCEDURE — 93005 ELECTROCARDIOGRAM TRACING: CPT

## 2022-10-21 PROCEDURE — 85025 COMPLETE CBC W/AUTO DIFF WBC: CPT | Performed by: PHYSICIAN ASSISTANT

## 2022-10-21 PROCEDURE — G1004 CDSM NDSC: HCPCS

## 2022-10-21 RX ORDER — METHOCARBAMOL 500 MG/1
500 TABLET, FILM COATED ORAL EVERY 12 HOURS PRN
Qty: 14 TABLET | Refills: 0 | Status: SHIPPED | OUTPATIENT
Start: 2022-10-21

## 2022-10-21 RX ORDER — HYDROMORPHONE HCL/PF 1 MG/ML
0.5 SYRINGE (ML) INJECTION ONCE
Status: COMPLETED | OUTPATIENT
Start: 2022-10-21 | End: 2022-10-21

## 2022-10-21 RX ORDER — LIDOCAINE 50 MG/G
1 PATCH TOPICAL ONCE
Status: DISCONTINUED | OUTPATIENT
Start: 2022-10-21 | End: 2022-10-21 | Stop reason: HOSPADM

## 2022-10-21 RX ORDER — MORPHINE SULFATE 4 MG/ML
4 INJECTION, SOLUTION INTRAMUSCULAR; INTRAVENOUS ONCE
Status: COMPLETED | OUTPATIENT
Start: 2022-10-21 | End: 2022-10-21

## 2022-10-21 RX ADMIN — HYDROMORPHONE HYDROCHLORIDE 0.5 MG: 1 INJECTION, SOLUTION INTRAMUSCULAR; INTRAVENOUS; SUBCUTANEOUS at 12:47

## 2022-10-21 RX ADMIN — LIDOCAINE 1 PATCH: 50 PATCH TOPICAL at 13:31

## 2022-10-21 RX ADMIN — MORPHINE SULFATE 4 MG: 4 INJECTION INTRAVENOUS at 11:17

## 2022-10-21 RX ADMIN — SODIUM CHLORIDE 1000 ML: 0.9 INJECTION, SOLUTION INTRAVENOUS at 11:17

## 2022-10-21 NOTE — DISCHARGE INSTRUCTIONS
Repeat BMP on Monday    ABDOMEN     LOWER CHEST:  No clinically significant abnormality identified in the visualized lower chest      LIVER/BILIARY TREE:  Unremarkable  GALLBLADDER:  No calcified gallstones  No pericholecystic inflammatory change  SPLEEN:  Unremarkable  PANCREAS:  Unremarkable  ADRENAL GLANDS:  Status post right adrenalectomy  KIDNEYS/URETERS:  Status post right nephrectomy  No CT evidence of left-sided nephrolithiasis  Left renal cysts some of which appear hyperdense  Evaluation of these lesions is limited without intravenous contrast but appear unchanged when compared   with the prior CT study  No hydroureter or obstructing ureteral calculus  No hydronephrosis  STOMACH AND BOWEL:  No bowel obstruction  Colonic diverticulosis without acute diverticulitis  APPENDIX:  A normal appendix was visualized  ABDOMINOPELVIC CAVITY:  No ascites  No pneumoperitoneum  No lymphadenopathy  VESSELS:  The abdominal aorta is calcified but normal in caliber  No retroperitoneal hematoma  PELVIS     REPRODUCTIVE ORGANS:  Unremarkable for patient's age  URINARY BLADDER:  Unremarkable  ABDOMINAL WALL/INGUINAL REGIONS:  Small fat-containing periumbilical hernia  OSSEOUS STRUCTURES:  No acute fracture or destructive osseous lesion  IMPRESSION:     No CT evidence of left-sided nephrolithiasis or obstructive uropathy  Left renal cysts some of which appear hyperdense limited in evaluation without intravenous contrast but appear overall stable since the prior study  Status post right nephrectomy and adrenalectomy  Colonic diverticulosis without acute diverticulitis  Small fat-containing periumbilical hernia

## 2022-10-21 NOTE — ED PROVIDER NOTES
History  Chief Complaint   Patient presents with   • Flank Pain     Pt c/o bilateral flank pain that radiates into his abdomin with sob , pt c/o frequent urination denies any other symptoms  Pt does only have his left kidney   Denies fevers and cp      Patient is a 45 y/o male hx of renal cell carcinoma (s/p nephrectomy right side in 2025), esinophilic esophagitis, sleep apnea, HTN, HLD, presenting to the ED for evaluation of flank pain  Pt reports he began with bilateral flank pain x4 days ago, with radiating pain to LLQ  Pt denies cp, but states pain is causing some SOB  No headache, congestion, cough  2 weeks of frequent urination   Pt denies fever, N/V/D, chest pain     On 10/12 - creatinine 1 5  Yesterday creatinine 1 7           Prior to Admission Medications   Prescriptions Last Dose Informant Patient Reported? Taking? Azelastine HCl 137 MCG/SPRAY SOLN   Yes No   Sig: as needed   LORazepam (ATIVAN) 0 5 mg tablet   Yes No   Patient not taking: No sig reported   ascorbic acid (VITAMIN C) 500 MG tablet   Yes No   Sig: Take 500 mg by mouth daily   aspirin 81 mg chewable tablet   Yes No   Sig: Chew   atorvastatin (LIPITOR) 40 mg tablet   Yes No   Sig: Take 40 mg by mouth daily   butalbital-acetaminophen-caffeine (FIORICET,ESGIC) -40 mg per tablet   No No   Sig: Take 1 tablet by mouth every 6 (six) hours as needed for headaches for up to 10 doses   carvedilol (COREG) 25 mg tablet   No No   Sig: Take 1 tablet (25 mg total) by mouth 2 (two) times a day with meals   cyclobenzaprine (FLEXERIL) 10 mg tablet   Yes No   Sig: Take 10 mg by mouth 3 (three) times a day as needed   ergocalciferol (ERGOCALCIFEROL) 1 25 MG (93878 UT) capsule   Yes No   Sig: Take 50,000 Units by mouth in the morning   famotidine (PEPCID) 20 mg tablet   Yes No   Sig: Take 20 mg by mouth in the morning   [facility] brand per pt     gabapentin (NEURONTIN) 300 mg capsule   Yes No   Sig: Take 300 mg by mouth daily   hydrocortisone (ANUSOL-HC) 2 5 % rectal cream   No No   Sig: Apply topically 2 (two) times a day for 10 days   Patient not taking: Reported on 9/29/2022   hyoscyamine (ANASPAZ,LEVSIN) 0 125 MG tablet   No No   Sig: Take 1 tablet (0 125 mg total) by mouth every 4 (four) hours as needed for cramping   levocetirizine (XYZAL) 5 MG tablet   Yes No   Sig: Take 5 mg by mouth every morning   Patient not taking: Reported on 9/29/2022   linaCLOtide (Linzess) 290 MCG CAPS   Yes No   Sig: Take 290 mcg by mouth daily   losartan-hydrochlorothiazide (HYZAAR) 100-12 5 MG per tablet   No No   Sig: Take 1 tablet by mouth daily Resume on 4/14   nortriptyline (PAMELOR) 50 mg capsule   Yes No   Sig: Take 50 mg by mouth daily at bedtime   pantoprazole (PROTONIX) 20 mg tablet   No No   Sig: Take 1 tablet (20 mg total) by mouth daily   penicillin V potassium (VEETID) 500 mg tablet   Yes No   Sig: Take 500 mg by mouth 4 (four) times a day   Patient not taking: Reported on 9/29/2022   sildenafil (REVATIO) 20 mg tablet   Yes No   Sig: TAKE THREE TABLETS BY MOUTH ONE HOUR PRIOR AS DIRECTED   Patient not taking: No sig reported   spironolactone (ALDACTONE) 25 mg tablet   No No   Sig: Take 2 tablets (50 mg total) by mouth daily Resume on 4/14   Patient taking differently: Take 25 mg by mouth 4 (four) times a day Resume on 4/14   topiramate (TOPAMAX) 25 mg tablet   Yes No   Sig: topiramate 25 mg tablet   take 1 tablet by mouth three times a day   triamcinolone (KENALOG) 0 1 % cream   Yes No   Sig: apply twice a day to rash ON face for 2 weeks then STOP      Facility-Administered Medications: None       Past Medical History:   Diagnosis Date   • Urena's esophagus without dysplasia 80/7471   • Eosinophilic esophagitis    • History of kidney cancer     2007   • Hyperlipidemia    • Hypertension    • Renal disorder    • Sleep apnea        Past Surgical History:   Procedure Laterality Date   • COLONOSCOPY  08/04/2015    Normal by Dr Maxwell [facility]   • [facility]  12/16/2016    [facility]   • COLONOSCOPY  05/14/2021    normal by Dr Angy Quiros   • EGD  05/2022    Dr Angy Quiros  Irregular Z line, biopsy showed very early stage Urena's esophagus, normal esophagus, negative H  pylori  Repeat EGD 1 year  • NEPHRECTOMY Right     Renal cell carcinoma   • UPPER GASTROINTESTINAL ENDOSCOPY  08/2020    Biopsy positive for eosinophilic esophagitis  Dr Angy Quiros  Esophagus normal, empiric dilated with [address]  Negative H  pylori  Ref to ENT  Family History   Problem Relation Age of Onset   • Hypertension Mother    • Lymphoma Mother    • Hyperlipidemia Mother    • Seizures Mother    • Diabetes Sister    • Diabetes Sister    • Diabetes Brother      I have reviewed and agree with the history as documented  E-Cigarette/Vaping   • E-Cigarette Use Never User      E-Cigarette/Vaping Substances     Social History     Tobacco Use   • Smoking status: Former Smoker     Packs/day: 0 00   • Smokeless tobacco: Never Used   • Tobacco comment: quit smoking 9 months ago   Vaping Use   • Vaping Use: Never used   Substance Use Topics   • Alcohol use: Never   • Drug use: Not Currently       Review of Systems   Constitutional: Negative for chills and fever  HENT: Negative for congestion, ear pain and sore throat  Eyes: Negative for visual disturbance  Respiratory: Negative for cough and shortness of breath  Cardiovascular: Negative for chest pain  Gastrointestinal: Positive for abdominal pain  Negative for constipation, diarrhea, nausea and vomiting  Genitourinary: Positive for flank pain and frequency  Negative for dysuria and hematuria  Musculoskeletal: Negative for back pain and neck pain  Skin: Negative for rash  Neurological: Negative for dizziness, speech difficulty, weakness and headaches  Psychiatric/Behavioral: Negative for confusion         Physical Exam  Physical Exam  Constitutional: General: He is not in acute distress  Appearance: He is well-developed  He is morbidly obese  He is not ill-appearing, toxic-appearing or diaphoretic  HENT:      Head: Normocephalic and atraumatic  Right Ear: External ear normal       Left Ear: External ear normal       Nose: Nose normal       Mouth/Throat:      Lips: Pink  Mouth: Mucous membranes are moist    Eyes:      Extraocular Movements: Extraocular movements intact  Conjunctiva/sclera: Conjunctivae normal    Cardiovascular:      Rate and Rhythm: Normal rate and regular rhythm  Pulmonary:      Effort: Pulmonary effort is normal       Breath sounds: Normal breath sounds  No decreased breath sounds, wheezing, rhonchi or rales  Abdominal:      General: Abdomen is flat  There is no distension  Palpations: Abdomen is soft  Tenderness: There is abdominal tenderness in the left lower quadrant  There is no right CVA tenderness, left CVA tenderness, guarding or rebound  Musculoskeletal:      Cervical back: Normal, normal range of motion and neck supple  Thoracic back: Normal         Back:    Skin:     General: Skin is warm  Capillary Refill: Capillary refill takes less than 2 seconds  Coloration: Skin is not jaundiced or pale  Findings: No rash  Neurological:      Mental Status: He is alert and oriented to person, place, and time     Psychiatric:         Mood and Affect: Mood and affect normal          Speech: Speech normal          Vital Signs  ED Triage Vitals   Temperature Pulse Respirations Blood Pressure SpO2   10/21/22 1010 10/21/22 1010 10/21/22 1010 10/21/22 1010 10/21/22 1010   97 7 °F (36 5 °C) 79 20 143/91 100 %      Temp Source Heart Rate Source Patient Position - Orthostatic VS BP Location FiO2 (%)   10/21/22 1010 10/21/22 1241 10/21/22 1010 10/21/22 1010 --   Oral Monitor Sitting Left arm       Pain Score       10/21/22 1117       5           Vitals:    10/21/22 1010 10/21/22 1241 10/21/22 1400 BP: 143/91 (!) 184/93 141/66   Pulse: 79 80 74   Patient Position - Orthostatic VS: Sitting Sitting Sitting         Visual Acuity      ED Medications  Medications   sodium chloride 0 9 % bolus 1,000 mL (0 mL Intravenous Stopped 10/21/22 1217)   morphine injection 4 mg (4 mg Intravenous Given 10/21/22 1117)   HYDROmorphone (DILAUDID) injection 0 5 mg (0 5 mg Intravenous Given 10/21/22 1247)       Diagnostic Studies  Results Reviewed     Procedure Component Value Units Date/Time    Urine Microscopic [[id number]]  (Abnormal) Collected: 10/21/22 1129    Lab Status: Final result Specimen: Urine, Clean Catch Updated: 10/21/22 1205     RBC, UA None Seen /hpf      WBC, UA 0-1 /hpf      Epithelial Cells Occasional /hpf      Bacteria, UA Occasional /hpf     HS Troponin 0hr (reflex protocol) [[id number]]  (Normal) Collected: 10/21/22 1115    Lab Status: Final result Specimen: Blood from Arm, Left Updated: 10/21/22 1204     hs TnI 0hr 4 ng/L     Comprehensive metabolic panel [[id number]]  (Abnormal) Collected: 10/21/22 1115    Lab Status: Final result Specimen: Blood from Arm, Left Updated: 10/21/22 1150     Sodium 136 mmol/L      Potassium 4 5 mmol/L      Chloride 97 mmol/L      CO2 29 mmol/L      ANION GAP 10 mmol/L      BUN 24 mg/dL      Creatinine 1 61 mg/dL      Glucose 131 mg/dL      Calcium 10 0 mg/dL      AST 12 U/L      ALT 27 U/L      Alkaline Phosphatase 81 U/L      Total Protein 9 0 g/dL      Albumin 3 9 g/dL      Total Bilirubin 0 92 mg/dL      eGFR 48 ml/min/1 73sq m     Narrative:      Fay guidelines for Chronic Kidney Disease (CKD):   •  Stage 1 with normal or high GFR (GFR > 90 mL/min/1 73 square meters)  •  Stage 2 Mild CKD (GFR = 60-89 mL/min/1 73 square meters)  •  Stage 3A Moderate CKD (GFR = 45-59 mL/min/1 73 square meters)  •  Stage 3B Moderate CKD (GFR = 30-44 mL/min/1 73 square meters)  •  Stage 4 Severe CKD (GFR = 15-29 mL/min/1 73 square meters)  •  Stage 5 End Stage CKD (GFR <15 mL/min/1 73 square meters)  Note: GFR calculation is accurate only with a steady state creatinine    Lipase [[id number]]  (Normal) Collected: 10/21/22 1115    Lab Status: Final result Specimen: Blood from Arm, Left Updated: 10/21/22 1150     Lipase 139 u/L     Urine Macroscopic, POC [[id number]]  (Abnormal) Collected: 10/21/22 1129    Lab Status: Final result Specimen: Urine Updated: 10/21/22 1130     Color, UA Yellow     Clarity, UA Clear     pH, UA 5 5     Leukocytes, UA Negative     Nitrite, UA Negative     Protein, UA 30 (1+) mg/dl      Glucose, UA Negative mg/dl      Ketones, UA Negative mg/dl      Urobilinogen, UA 0 2 E U /dl      Bilirubin, UA Negative     Occult Blood, UA Negative     Specific Gravity, UA 1 020    Narrative:      CLINITEK RESULT    CBC and differential [[id number]]  (Abnormal) Collected: 10/21/22 1115    Lab Status: Final result Specimen: Blood from Arm, Left Updated: 10/21/22 1127     WBC 13 12 Thousand/uL      RBC 5 06 Million/uL      Hemoglobin 13 8 g/dL      Hematocrit 43 5 %      MCV 86 fL      MCH 27 3 pg      MCHC 31 7 g/dL      RDW 13 5 %      MPV 9 7 fL      Platelets 855 Thousands/uL      nRBC 0 /100 WBCs      Neutrophils Relative 78 %      Immat GRANS % 1 %      Lymphocytes Relative 13 %      Monocytes Relative 7 %      Eosinophils Relative 1 %      Basophils Relative 0 %      Neutrophils Absolute 10 15 Thousands/µL      Immature Grans Absolute 0 12 Thousand/uL      Lymphocytes Absolute 1 73 Thousands/µL      Monocytes Absolute 0 97 Thousand/µL      Eosinophils Absolute 0 11 Thousand/µL      Basophils Absolute 0 04 Thousands/µL                  CT abdomen pelvis wo contrast   Final Result by Jayne Parker MD (10/21 1302)      No CT evidence of left-sided nephrolithiasis or obstructive uropathy  Left renal cysts some of which appear hyperdense limited in evaluation without intravenous contrast but appear overall stable since the prior study        Status post right nephrectomy and adrenalectomy  Colonic diverticulosis without acute diverticulitis  Small fat-containing periumbilical hernia  Workstation performed: [id number]                    Procedures  ECG 12 Lead Documentation Only    Date/Time: 10/21/2022 11:23 AM  Performed by: Ganga Ramsey PA-C  Authorized by: Ganga Ramsey PA-C     Indications / Diagnosis:  Sob  ECG reviewed by me, the ED Provider: yes    Patient location:  ED  Previous ECG:     Previous ECG:  Compared to current    Comparison ECG info:  07-june-2022    Similarity:  No change    Comparison to cardiac monitor: Yes    Interpretation:     Interpretation: abnormal    Quality:     Tracing quality:  Limited by artifact  Rate:     ECG rate:  78    ECG rate assessment: normal    Rhythm:     Rhythm: sinus rhythm    Ectopy:     Ectopy: none    QRS:     QRS axis:  Normal    QRS intervals:  Normal  Conduction:     Conduction: normal    ST segments:     ST segments:  Normal  T waves:     T waves: normal    Comments:      QT/QTc: 352/401  No STEMI             ED Course  ED Course as of 10/22/22 1624   Fri Oct 21, 2022   1200 Creatinine(!): 1 61  Improved from yesterday with creatinine of 1 70   1319 CT abdomen pelvis wo contrast  Status post right nephrectomy  No CT evidence of left-sided nephrolithiasis  Left renal cysts some of which appear hyperdense  1322 TT to Nephrology on-call   [phone] with on-call Nephrology, will give rx for repeat BMP to have done on Monday, if any decrease of urine output or worsening flank pain, return to the ED                               SBIRT 20yo+    Flowsheet Row Most Recent Value   SBIRT (25 yo +)    In order to provide better care to our patients, we are screening all of our patients for alcohol and drug use  Would it be okay to ask you these screening questions?  No Filed at: 10/21/2022 1110                    MDM  Number of Diagnoses or Management Options  BEN (acute kidney injury) ([facility] [address] )  Back pain  Flank pain  Diagnosis management comments: Patient is a 45 y/o male hx of renal cell carcinoma (s/p nephrectomy right side in 6070), esinophilic esophagitis, sleep apnea, HTN, HLD, presenting to the ED for evaluation of flank pain  EKG shows no ischemic changes/troponin <2   Urine negative for infection  Creatinine 1 61  CT a/p shows No CT evidence of left-sided nephrolithiasis or obstructive uropathy  Left renal cysts some of which appear hyperdense limited in evaluation without intravenous contrast but appear overall stable since the prior study  Status post right nephrectomy and adrenalectomy  Colonic diverticulosis without acute diverticulitis  Small fat-containing periumbilical hernia  Likely musculoskeletal etiology of lower back pain - no urinary retention, perianal numbness, incontinence  No red flag sx  rx for robaxin  Avoid NSAIDs  Discussed with Nephrology - will f/u as outpatient, does have appt with [facility] on 11/2, but will give ambulatory referral to [facility]     Patient verbalizes understanding and agrees with plan  The management plan was discussed in detail with the patient at bedside and all questions were answered  Prior to discharge, I provided both verbal and written instructions  I discussed with the patient the signs and symptoms for which to return to the emergency department  All questions were answered and patient was comfortable with the plan of care and discharged to home  The patient agrees to return to the Emergency Department for concerns and/or progression of illness          Disposition  Final diagnoses:   Flank pain   BEN (acute kidney injury) ([facility] [address] )   Back pain     Time reflects when diagnosis was documented in both MDM as applicable and the Disposition within this note     Time User Action Codes Description Comment    10/21/2022  2:15 PM Earlean Cotton Add [R10 9] Flank pain     10/21/2022  2:15 PM Earlean Cotton Add [N17 9] BEN (acute kidney injury) ([facility] [address] )     10/21/2022  2:16 PM Waylon Ramirez Add [M54 9] Back pain       ED Disposition     ED Disposition   Discharge    Condition   Stable    Date/Time   Fri Oct 21, 2022  2:16 PM    Comment   Wilverairam Banda discharge to home/self care                 Follow-up Information     Follow up With Specialties Details Why Contact Info Additional [facility] Schedule an appointment as soon as possible for a visit   [address] [place] [phone]  [address] [place] [address] Nephrology [place], [address] [address], [address] [facility]          Discharge Medication List as of 10/21/2022  2:23 PM      START taking these medications    Details   methocarbamol (ROBAXIN) 500 mg tablet Take 1 tablet (500 mg total) by mouth every 12 (twelve) hours as needed for muscle spasms, Starting Fri 10/21/2022, Normal         CONTINUE these medications which have NOT CHANGED    Details   ascorbic acid (VITAMIN C) 500 MG tablet Take 500 mg by mouth daily, Historical Med      aspirin 81 mg chewable tablet Chew, Historical Med      atorvastatin (LIPITOR) 40 mg tablet Take 40 mg by mouth daily, Historical Med      Azelastine HCl 137 MCG/SPRAY SOLN as needed, Starting 2022, Historical Med      butalbital-acetaminophen-caffeine (FIORICET,ESGIC) -40 mg per tablet Take 1 tablet by mouth every 6 (six) hours as needed for headaches for up to 10 doses, Starting 2021, Normal      carvedilol (COREG) 25 mg tablet Take 1 tablet (25 mg total) by mouth 2 (two) times a day with meals, Starting Mon 2021, Until Thu 2022, Normal      cyclobenzaprine (FLEXERIL) 10 mg tablet Take 10 mg by mouth 3 (three) times a day as needed, Historical Med      ergocalciferol (ERGOCALCIFEROL) 1 25 MG (18156 UT) capsule Take 50,000 Units by mouth in the morning, Starting 2022, Until 2022, Historical Med      famotidine (PEPCID) 20 mg tablet Take 20 mg by mouth in the morning  [facility] brand per pt  , Historical Med      gabapentin (NEURONTIN) 300 mg capsule Take 300 mg by mouth daily, Starting Fri 9/16/2022, Historical Med      hydrocortisone (ANUSOL-HC) 2 5 % rectal cream Apply topically 2 (two) times a day for 10 days, Starting Thu 8/25/2022, Until Sun 9/4/2022, Normal      hyoscyamine (ANASPAZ,LEVSIN) 0 125 MG tablet Take 1 tablet (0 125 mg total) by mouth every 4 (four) hours as needed for cramping, Starting Mon 8/16/2021, Normal      levocetirizine (XYZAL) 5 MG tablet Take 5 mg by mouth every morning, Starting Wed 7/29/2020, Historical Med      linaCLOtide (Linzess) 290 MCG CAPS Take 290 mcg by mouth daily, Historical Med      LORazepam (ATIVAN) 0 5 mg tablet Starting Tue 8/25/2020, Historical Med      losartan-hydrochlorothiazide (HYZAAR) 100-12 5 MG per tablet Take 1 tablet by mouth daily Resume on 4/14, Starting Mon 4/12/2021, No Print      nortriptyline (PAMELOR) 50 mg capsule Take 50 mg by mouth daily at bedtime, Starting Mon 7/12/2021, Historical Med      pantoprazole (PROTONIX) 20 mg tablet Take 1 tablet (20 mg total) by mouth daily, Starting Thu 5/27/2021, Until Thu 9/29/2022, Normal      penicillin V potassium (VEETID) 500 mg tablet Take 500 mg by mouth 4 (four) times a day, Starting Tue 5/3/2022, Historical Med      sildenafil (REVATIO) 20 mg tablet TAKE THREE TABLETS BY MOUTH ONE HOUR PRIOR AS DIRECTED, Historical Med      spironolactone (ALDACTONE) 25 mg tablet Take 2 tablets (50 mg total) by mouth daily Resume on 4/14, Starting Mon 4/12/2021, No Print      topiramate (TOPAMAX) 25 mg tablet topiramate 25 mg tablet   take 1 tablet by mouth three times a day, Historical Med      triamcinolone (KENALOG) 0 1 % cream apply twice a day to rash ON face for 2 weeks then STOP, Historical Med             Outpatient Discharge Orders   Basic metabolic panel   Standing Status: Future Standing Exp   Date: 10/21/23       PDMP Review     None          ED Provider  Electronically Signed by           Eulalia Jackson PA-C  10/22/22 5932

## 2022-10-22 LAB
ATRIAL RATE: 78 BPM
P AXIS: 76 DEGREES
PR INTERVAL: 184 MS
QRS AXIS: 23 DEGREES
QRSD INTERVAL: 92 MS
QT INTERVAL: 352 MS
QTC INTERVAL: 401 MS
T WAVE AXIS: 52 DEGREES
VENTRICULAR RATE: 78 BPM

## 2022-10-22 PROCEDURE — 93010 ELECTROCARDIOGRAM REPORT: CPT | Performed by: STUDENT IN AN ORGANIZED HEALTH CARE EDUCATION/TRAINING PROGRAM

## 2022-10-24 ENCOUNTER — TELEPHONE (OUTPATIENT)
Dept: NEPHROLOGY | Facility: CLINIC | Age: 52
End: 2022-10-24

## 2022-10-24 NOTE — TELEPHONE ENCOUNTER
cNew Patient Intake Form   Patient Details   Gildardo Muhammad     1970     [id number]     Insurance Information   Name of Trevon Rangel   Does the patient need an insurance referral? no   If patient has [employer]ney Amari, please ask if they will be using their Pitney Amari  Appointment Information   Who is calling to schedule? If not patient, what is callers name? Patient   Referring Provider Temo Johansen - ER doc   Referring Provider Number n/a   Reason for Appt (Diagnosis) N17 9 (ICD-10-CM) - BEN (acute kidney injury) ([facility] [address] )   Is patient aware of why they are being referred? yes   Does Patient have labs done at [facility]? If not, where do they go?  yes - or [facility]   Has patient had labs / urine work done? List date of most recent lab / urine work  yes10/21   Has patient had a BMP & CBC done in the past 2 years? If so, list the date Yes 10/21   Has patient been hospitalized recently? If yes, list name and location of hospital they were in  no   Has patient been seen by a Nephrologist before? If yes, list name, location and phone number no - had kidney removed around 2007 but denies ever seeing nephro  Has patient been see by another Speciality before (ex  Neurology, urology, cardiology)? If yes, please list name, and speciality  Endo - Fidencio ([facility]) Cardio - Filomena ([facility]), Neuro - Kamron ([facility])   Has the patient had imaging done? If so, list the most recent date and type of imaging no   Does the patient has a stone analysis report if history of kidney stones? no    Appointment Details   Is there a referral on file?  yes    Appointment Date  2/3/23   Location [place]   Miscellaneous

## 2022-12-21 ENCOUNTER — APPOINTMENT (EMERGENCY)
Dept: RADIOLOGY | Facility: HOSPITAL | Age: 52
End: 2022-12-21

## 2022-12-21 ENCOUNTER — HOSPITAL ENCOUNTER (EMERGENCY)
Facility: HOSPITAL | Age: 52
Discharge: HOME | End: 2022-12-21
Attending: EMERGENCY MEDICINE

## 2022-12-21 VITALS
HEART RATE: 86 BPM | OXYGEN SATURATION: 98 % | SYSTOLIC BLOOD PRESSURE: 141 MMHG | WEIGHT: 315 LBS | TEMPERATURE: 98 F | DIASTOLIC BLOOD PRESSURE: 92 MMHG | RESPIRATION RATE: 18 BRPM | BODY MASS INDEX: 48.85 KG/M2

## 2022-12-21 DIAGNOSIS — I10 HYPERTENSION: Primary | ICD-10-CM

## 2022-12-21 LAB
ALBUMIN SERPL BCP-MCNC: 4.3 G/DL (ref 3.5–5)
ALP SERPL-CCNC: 69 U/L (ref 43–122)
ALT SERPL W P-5'-P-CCNC: 21 U/L
ANION GAP SERPL CALCULATED.3IONS-SCNC: 7 MMOL/L (ref 5–14)
AST SERPL W P-5'-P-CCNC: 19 U/L (ref 17–59)
BASOPHILS # BLD AUTO: 0.07 THOUSANDS/ÂΜL (ref 0–0.1)
BASOPHILS NFR BLD AUTO: 0 % (ref 0–1)
BILIRUB SERPL-MCNC: 0.62 MG/DL (ref 0.2–1)
BUN SERPL-MCNC: 24 MG/DL (ref 5–25)
CALCIUM SERPL-MCNC: 9.9 MG/DL (ref 8.4–10.2)
CARDIAC TROPONIN I PNL SERPL HS: 4 NG/L
CHLORIDE SERPL-SCNC: 99 MMOL/L (ref 96–108)
CO2 SERPL-SCNC: 34 MMOL/L (ref 21–32)
CREAT SERPL-MCNC: 1.34 MG/DL (ref 0.7–1.5)
EOSINOPHIL # BLD AUTO: 0.13 THOUSAND/ÂΜL (ref 0–0.61)
EOSINOPHIL NFR BLD AUTO: 1 % (ref 0–6)
ERYTHROCYTE [DISTWIDTH] IN BLOOD BY AUTOMATED COUNT: 13.8 % (ref 11.6–15.1)
GFR SERPL CREATININE-BSD FRML MDRD: 60 ML/MIN/1.73SQ M
GLUCOSE SERPL-MCNC: 129 MG/DL (ref 70–99)
HCT VFR BLD AUTO: 43 % (ref 36.5–49.3)
HGB BLD-MCNC: 13.1 G/DL (ref 12–17)
IMM GRANULOCYTES # BLD AUTO: 0.24 THOUSAND/UL (ref 0–0.2)
IMM GRANULOCYTES NFR BLD AUTO: 1 % (ref 0–2)
LYMPHOCYTES # BLD AUTO: 2.79 THOUSANDS/ÂΜL (ref 0.6–4.47)
LYMPHOCYTES NFR BLD AUTO: 12 % (ref 14–44)
MCH RBC QN AUTO: 26.2 PG (ref 26.8–34.3)
MCHC RBC AUTO-ENTMCNC: 30.5 G/DL (ref 31.4–37.4)
MCV RBC AUTO: 86 FL (ref 82–98)
MONOCYTES # BLD AUTO: 1.78 THOUSAND/ÂΜL (ref 0.17–1.22)
MONOCYTES NFR BLD AUTO: 8 % (ref 4–12)
NEUTROPHILS # BLD AUTO: 18.21 THOUSANDS/ÂΜL (ref 1.85–7.62)
NEUTS SEG NFR BLD AUTO: 78 % (ref 43–75)
NRBC BLD AUTO-RTO: 0 /100 WBCS
PLATELET # BLD AUTO: 336 THOUSANDS/UL (ref 149–390)
PMV BLD AUTO: 9.6 FL (ref 8.9–12.7)
POTASSIUM SERPL-SCNC: 4 MMOL/L (ref 3.5–5.3)
PROT SERPL-MCNC: 7.9 G/DL (ref 6.4–8.4)
RBC # BLD AUTO: 5 MILLION/UL (ref 3.88–5.62)
SODIUM SERPL-SCNC: 140 MMOL/L (ref 135–147)
WBC # BLD AUTO: 23.22 THOUSAND/UL (ref 4.31–10.16)

## 2022-12-22 LAB
ATRIAL RATE: 78 BPM
QRS AXIS: -2 DEGREES
QRSD INTERVAL: 88 MS
QT INTERVAL: 368 MS
QTC INTERVAL: 424 MS
T WAVE AXIS: 56 DEGREES
VENTRICULAR RATE: 80 BPM

## 2022-12-22 NOTE — ED PROVIDER NOTES
History  Chief Complaint   Patient presents with   • Headache     Patient reports Blood pressure reading at home of 150/102  Complains of headache, took 2 tylenol around noon  51-year-old male with history of hypertension, diabetes, hyperlipidemia, nephrectomy, renal cancer and prior BEN presents complaining of mild chest tightness and headache after high blood pressure reading at home  Patient states that his blood pressure has been elevated for 3 days and has been reading approximately 150/102 at home  Denies chest pain shortness of breath or painful breathing but states he feels a mild tightness and a mild headache  Denies worst headache of his life sensation, denies any changes to vision or difficulty urinating  Patient has currently taking prednisone for esophagitis  Denies any other complaints       History provided by:  Patient   used: No        Prior to Admission Medications   Prescriptions Last Dose Informant Patient Reported? Taking?    Azelastine HCl 137 MCG/SPRAY SOLN   Yes No   Sig: as needed   LORazepam (ATIVAN) 0 5 mg tablet   Yes No   Patient not taking: No sig reported   ascorbic acid (VITAMIN C) 500 MG tablet   Yes No   Sig: Take 500 mg by mouth daily   aspirin 81 mg chewable tablet   Yes No   Sig: Chew   atorvastatin (LIPITOR) 40 mg tablet   Yes No   Sig: Take 40 mg by mouth daily   butalbital-acetaminophen-caffeine (FIORICET,ESGIC) -40 mg per tablet   No No   Sig: Take 1 tablet by mouth every 6 (six) hours as needed for headaches for up to 10 doses   carvedilol (COREG) 25 mg tablet   No No   Sig: Take 1 tablet (25 mg total) by mouth 2 (two) times a day with meals   cyclobenzaprine (FLEXERIL) 10 mg tablet   Yes No   Sig: Take 10 mg by mouth 3 (three) times a day as needed   ergocalciferol (ERGOCALCIFEROL) 1 25 MG (91168 UT) capsule   Yes No   Sig: Take 50,000 Units by mouth in the morning   famotidine (PEPCID) 20 mg tablet   Yes No   Sig: Take 20 mg by mouth in the morning   [facility] brand per pt     gabapentin (NEURONTIN) 300 mg capsule   Yes No   Sig: Take 300 mg by mouth daily   hydrocortisone (ANUSOL-HC) 2 5 % rectal cream   No No   Sig: Apply topically 2 (two) times a day for 10 days   Patient not taking: Reported on 9/29/2022   hyoscyamine (ANASPAZ,LEVSIN) 0 125 MG tablet   No No   Sig: Take 1 tablet (0 125 mg total) by mouth every 4 (four) hours as needed for cramping   levocetirizine (XYZAL) 5 MG tablet   Yes No   Sig: Take 5 mg by mouth every morning   Patient not taking: Reported on 9/29/2022   linaCLOtide (Linzess) 290 MCG CAPS   Yes No   Sig: Take 290 mcg by mouth daily   losartan-hydrochlorothiazide (HYZAAR) 100-12 5 MG per tablet   No No   Sig: Take 1 tablet by mouth daily Resume on 4/14   methocarbamol (ROBAXIN) 500 mg tablet   No No   Sig: Take 1 tablet (500 mg total) by mouth every 12 (twelve) hours as needed for muscle spasms   nortriptyline (PAMELOR) 50 mg capsule   Yes No   Sig: Take 50 mg by mouth daily at bedtime   pantoprazole (PROTONIX) 20 mg tablet   No No   Sig: Take 1 tablet (20 mg total) by mouth daily   penicillin V potassium (VEETID) 500 mg tablet   Yes No   Sig: Take 500 mg by mouth 4 (four) times a day   Patient not taking: Reported on 9/29/2022   sildenafil (REVATIO) 20 mg tablet   Yes No   Sig: TAKE THREE TABLETS BY MOUTH ONE HOUR PRIOR AS DIRECTED   Patient not taking: No sig reported   spironolactone (ALDACTONE) 25 mg tablet   No No   Sig: Take 2 tablets (50 mg total) by mouth daily Resume on 4/14   Patient taking differently: Take 25 mg by mouth 4 (four) times a day Resume on 4/14   topiramate (TOPAMAX) 25 mg tablet   Yes No   Sig: topiramate 25 mg tablet   take 1 tablet by mouth three times a day   triamcinolone (KENALOG) 0 1 % cream   Yes No   Sig: apply twice a day to rash ON face for 2 weeks then STOP      Facility-Administered Medications: None       Past Medical History:   Diagnosis Date   • Urena's esophagus without dysplasia 26/6804   • Eosinophilic esophagitis    • History of kidney cancer     2007   • Hyperlipidemia    • Hypertension    • Renal disorder    • Sleep apnea        Past Surgical History:   Procedure Laterality Date   • COLONOSCOPY  08/04/2015    Normal by Dr Brian Cazares at [address]  12/16/2016    Diverticulosis otherwise normal by Dr Brian Cazares, [facility]   • COLONOSCOPY  05/14/2021    normal by Dr Jeannie Stone   • EGD  05/2022    Dr Jeannie Stone  Irregular Z line, biopsy showed very early stage Urena's esophagus, normal esophagus, negative H  pylori  Repeat EGD 1 year  • NEPHRECTOMY Right     Renal cell carcinoma   • UPPER GASTROINTESTINAL ENDOSCOPY  08/2020    Biopsy positive for eosinophilic esophagitis  Dr Jeannie Stone  Esophagus normal, empiric dilated with [address]  Negative H  pylori  Ref to ENT  Family History   Problem Relation Age of Onset   • Hypertension Mother    • Lymphoma Mother    • Hyperlipidemia Mother    • Seizures Mother    • Diabetes Sister    • Diabetes Sister    • Diabetes Brother      I have reviewed and agree with the history as documented  E-Cigarette/Vaping   • E-Cigarette Use Never User      E-Cigarette/Vaping Substances     Social History     Tobacco Use   • Smoking status: Former     Packs/day: 0 00     Types: Cigarettes   • Smokeless tobacco: Never   • Tobacco comments:     quit smoking 9 months ago   Vaping Use   • Vaping Use: Never used   Substance Use Topics   • Alcohol use: Never   • Drug use: Not Currently       Review of Systems   Constitutional: Negative  Negative for chills and fatigue  HENT: Negative for ear pain and sore throat  Eyes: Negative for photophobia and redness  Respiratory: Positive for chest tightness  Negative for apnea, cough and shortness of breath  Cardiovascular: Negative for chest pain  Gastrointestinal: Negative for abdominal pain, nausea and vomiting  Genitourinary: Negative for dysuria     Musculoskeletal: Negative for arthralgias, neck pain and neck stiffness  Skin: Negative for rash  Neurological: Positive for headaches  Negative for dizziness, tremors, syncope and weakness  Psychiatric/Behavioral: Negative for suicidal ideas  Physical Exam  Physical Exam  Constitutional:       General: He is not in acute distress  Appearance: He is well-developed  He is not diaphoretic  Eyes:      Pupils: Pupils are equal, round, and reactive to light  Cardiovascular:      Rate and Rhythm: Normal rate and regular rhythm  Pulmonary:      Effort: Pulmonary effort is normal  No respiratory distress  Breath sounds: Normal breath sounds  Abdominal:      General: Bowel sounds are normal  There is no distension  Palpations: Abdomen is soft  Musculoskeletal:         General: Normal range of motion  Cervical back: Normal range of motion and neck supple  Skin:     General: Skin is warm and dry  Neurological:      Mental Status: He is alert and oriented to person, place, and time           Vital Signs  ED Triage Vitals [12/21/22 1952]   Temperature Pulse Respirations Blood Pressure SpO2   98 °F (36 7 °C) 86 18 (!) 147/106 98 %      Temp Source Heart Rate Source Patient Position - Orthostatic VS BP Location FiO2 (%)   Oral -- Sitting Left arm --      Pain Score       --           Vitals:    12/21/22 1952 12/21/22 2115   BP: (!) 147/106 141/92   Pulse: 86    Patient Position - Orthostatic VS: Sitting          Visual Acuity      ED Medications  Medications - No data to display    Diagnostic Studies  Results Reviewed     Procedure Component Value Units Date/Time    HS Troponin 0hr (reflex protocol) [[id number]]  (Normal) Collected: 12/21/22 2030    Lab Status: Final result Specimen: Blood from Arm, Left Updated: 12/21/22 2101     hs TnI 0hr 4 ng/L     HS Troponin I 2hr [[id number]]     Lab Status: No result Specimen: Blood     Comprehensive metabolic panel [[id number]]  (Abnormal) Collected: 12/21/22 2030 Lab Status: Final result Specimen: Blood from Arm, Left Updated: 12/21/22 2050     Sodium 140 mmol/L      Potassium 4 0 mmol/L      Chloride 99 mmol/L      CO2 34 mmol/L      ANION GAP 7 mmol/L      BUN 24 mg/dL      Creatinine 1 34 mg/dL      Glucose 129 mg/dL      Calcium 9 9 mg/dL      AST 19 U/L      ALT 21 U/L      Alkaline Phosphatase 69 U/L      Total Protein 7 9 g/dL      Albumin 4 3 g/dL      Total Bilirubin 0 62 mg/dL      eGFR 60 ml/min/1 73sq m     Narrative:      National Kidney Disease Foundation guidelines for Chronic Kidney Disease (CKD):   •  Stage 1 with normal or high GFR (GFR > 90 mL/min/1 73 square meters)  •  Stage 2 Mild CKD (GFR = 60-89 mL/min/1 73 square meters)  •  Stage 3A Moderate CKD (GFR = 45-59 mL/min/1 73 square meters)  •  Stage 3B Moderate CKD (GFR = 30-44 mL/min/1 73 square meters)  •  Stage 4 Severe CKD (GFR = 15-29 mL/min/1 73 square meters)  •  Stage 5 End Stage CKD (GFR <15 mL/min/1 73 square meters)  Note: GFR calculation is accurate only with a steady state creatinine    CBC and differential [[id number]]  (Abnormal) Collected: 12/21/22 2030    Lab Status: Final result Specimen: Blood from Arm, Left Updated: 12/21/22 2041     WBC 23 22 Thousand/uL      RBC 5 00 Million/uL      Hemoglobin 13 1 g/dL      Hematocrit 43 0 %      MCV 86 fL      MCH 26 2 pg      MCHC 30 5 g/dL      RDW 13 8 %      MPV 9 6 fL      Platelets 278 Thousands/uL      nRBC 0 /100 WBCs      Neutrophils Relative 78 %      Immat GRANS % 1 %      Lymphocytes Relative 12 %      Monocytes Relative 8 %      Eosinophils Relative 1 %      Basophils Relative 0 %      Neutrophils Absolute 18 21 Thousands/µL      Immature Grans Absolute 0 24 Thousand/uL      Lymphocytes Absolute 2 79 Thousands/µL      Monocytes Absolute 1 78 Thousand/µL      Eosinophils Absolute 0 13 Thousand/µL      Basophils Absolute 0 07 Thousands/µL                  XR chest 2 views   ED Interpretation by Roma Craig PA-C (12/21 2049) No focal consolidation or abnormality                 Procedures  ECG 12 Lead Documentation Only    Date/Time: 12/21/2022 8:33 PM  Performed by: Sushant Saravia PA-C  Authorized by: Sushant Saravia PA-C     Indications / Diagnosis:  Chest tightness  ECG reviewed by me, the ED Provider: yes    Patient location:  ED  Interpretation:     Interpretation: normal    Quality:     Tracing quality:  Limited by artifact  Rate:     ECG rate:  80    ECG rate assessment: normal    Rhythm:     Rhythm: sinus rhythm    Ectopy:     Ectopy: PVCs      PVCs:  Infrequent  QRS:     QRS axis:  Normal    QRS intervals:  Normal  Conduction:     Conduction: normal    ST segments:     ST segments:  Normal  T waves:     T waves: normal               ED Course  ED Course as of 12/21/22 2210   Wed Dec 21, 2022   2103 hs TnI 0hr: 4             HEART Risk Score    Flowsheet Row Most Recent Value   Heart Score Risk Calculator    History 0 Filed at: 12/21/2022 2032   ECG 1 Filed at: 12/21/2022 2032   Age 1 Filed at: 12/21/2022 2032   Risk Factors 2 Filed at: 12/21/2022 2032   Troponin 0 Filed at: 12/21/2022 2032   HEART Score 4 Filed at: 12/21/2022 2032                                      MDM  Number of Diagnoses or Management Options  Hypertension: new and does not require workup  Diagnosis management comments: Patient had benign evaluation emergency department  No acute cause of hypertension today  Thought to be secondary to chronic hypertension versus prior nephrectomy versus CKD versus current prednisone use  Patient has appointment with his endocrinologist tomorrow  Educated supportive care, given strict return precautions    Discharged home       Amount and/or Complexity of Data Reviewed  Clinical lab tests: ordered and reviewed  Tests in the radiology section of CPT®: ordered and reviewed    Risk of Complications, Morbidity, and/or Mortality  Presenting problems: moderate  Diagnostic procedures: moderate  Management options: moderate        Disposition  Final diagnoses:   Hypertension     Time reflects when diagnosis was documented in both MDM as applicable and the Disposition within this note     Time User Action Codes Description Comment    12/21/2022  9:16 PM Yonis Waldrop [facility] Hypertension       ED Disposition     ED Disposition   Discharge    Condition   Stable    Date/Time   Wed Dec 21, 2022  9:16 PM    [address] discharge to home/self care                 Follow-up Information     Follow up With Specialties Details Why Contact Info Additional [address] Heart Emergency Department Emergency Medicine Go to  If symptoms worsen [address] [address]  [facility] Heart Emergency Department    Vijay Antonio MD Family Medicine Call  As needed [address] [facility], [address] [address] [phone]             Discharge Medication List as of 12/21/2022  9:17 PM      CONTINUE these medications which have NOT CHANGED    Details   ascorbic acid (VITAMIN C) 500 MG tablet Take 500 mg by mouth daily, Historical Med      aspirin 81 mg chewable tablet Chew, Historical Med      atorvastatin (LIPITOR) 40 mg tablet Take 40 mg by mouth daily, Historical Med      Azelastine HCl 137 MCG/SPRAY SOLN as needed, Starting Wed 5/4/2022, Historical Med      butalbital-acetaminophen-caffeine (FIORICET,ESGIC) -40 mg per tablet Take 1 tablet by mouth every 6 (six) hours as needed for headaches for up to 10 doses, Starting Fri 9/17/2021, Normal      carvedilol (COREG) 25 mg tablet Take 1 tablet (25 mg total) by mouth 2 (two) times a day with meals, Starting Mon 4/12/2021, Until Thu 9/29/2022, Normal      cyclobenzaprine (FLEXERIL) 10 mg tablet Take 10 mg by mouth 3 (three) times a day as needed, Historical Med      ergocalciferol (ERGOCALCIFEROL) 1 25 MG (30391 UT) capsule Take 50,000 Units by mouth in the morning, Starting Wed 8/24/2022, Until Wed 11/16/2022, Historical Med      famotidine (PEPCID) 20 mg tablet Take 20 mg by mouth in the morning   [facility] brand per pt  , Historical Med      gabapentin (NEURONTIN) 300 mg capsule Take 300 mg by mouth daily, Starting Fri 9/16/2022, Historical Med      hydrocortisone (ANUSOL-HC) 2 5 % rectal cream Apply topically 2 (two) times a day for 10 days, Starting Thu 8/25/2022, Until Sun 9/4/2022, Normal      hyoscyamine (ANASPAZ,LEVSIN) 0 125 MG tablet Take 1 tablet (0 125 mg total) by mouth every 4 (four) hours as needed for cramping, Starting Mon 8/16/2021, Normal      levocetirizine (XYZAL) 5 MG tablet Take 5 mg by mouth every morning, Starting Wed 7/29/2020, Historical Med      linaCLOtide (Linzess) 290 MCG CAPS Take 290 mcg by mouth daily, Historical Med      LORazepam (ATIVAN) 0 5 mg tablet Starting Tue 8/25/2020, Historical Med      losartan-hydrochlorothiazide (HYZAAR) 100-12 5 MG per tablet Take 1 tablet by mouth daily Resume on 4/14, Starting Mon 4/12/2021, No Print      methocarbamol (ROBAXIN) 500 mg tablet Take 1 tablet (500 mg total) by mouth every 12 (twelve) hours as needed for muscle spasms, Starting Fri 10/21/2022, Normal      nortriptyline (PAMELOR) 50 mg capsule Take 50 mg by mouth daily at bedtime, Starting Mon 7/12/2021, Historical Med      pantoprazole (PROTONIX) 20 mg tablet Take 1 tablet (20 mg total) by mouth daily, Starting Thu 5/27/2021, Until Thu 9/29/2022, Normal      penicillin V potassium (VEETID) 500 mg tablet Take 500 mg by mouth 4 (four) times a day, Starting Tue 5/3/2022, Historical Med      sildenafil (REVATIO) 20 mg tablet TAKE THREE TABLETS BY MOUTH ONE HOUR PRIOR AS DIRECTED, Historical Med      spironolactone (ALDACTONE) 25 mg tablet Take 2 tablets (50 mg total) by mouth daily Resume on 4/14, Starting Mon 4/12/2021, No Print      topiramate (TOPAMAX) 25 mg tablet topiramate 25 mg tablet   take 1 tablet by mouth three times a day, Historical Med triamcinolone (KENALOG) 0 1 % cream apply twice a day to rash ON face for 2 weeks then STOP, Historical Med             No discharge procedures on file      PDMP Review     None          ED Provider  Electronically Signed by           Roma Craig PA-C  12/21/22 4839

## 2022-12-22 NOTE — DISCHARGE INSTRUCTIONS
Continue all medications as directed  Return for new or worsening complaints    Follow-up with your primary care doctor as well as endocrinologist

## 2022-12-27 ENCOUNTER — TELEPHONE (OUTPATIENT)
Dept: NEPHROLOGY | Facility: CLINIC | Age: 52
End: 2022-12-27

## 2025-05-09 ENCOUNTER — CONSULT (OUTPATIENT)
Dept: RHEUMATOLOGY | Facility: CLINIC | Age: 55
End: 2025-05-09
Payer: COMMERCIAL

## 2025-05-09 ENCOUNTER — APPOINTMENT (OUTPATIENT)
Dept: LAB | Facility: CLINIC | Age: 55
End: 2025-05-09
Payer: COMMERCIAL

## 2025-05-09 VITALS
HEART RATE: 94 BPM | HEIGHT: 68 IN | OXYGEN SATURATION: 95 % | BODY MASS INDEX: 47.65 KG/M2 | DIASTOLIC BLOOD PRESSURE: 82 MMHG | SYSTOLIC BLOOD PRESSURE: 132 MMHG | WEIGHT: 314.4 LBS

## 2025-05-09 DIAGNOSIS — R79.82 ELEVATED C-REACTIVE PROTEIN (CRP): ICD-10-CM

## 2025-05-09 DIAGNOSIS — R22.1 SUBMANDIBULAR SWELLING: ICD-10-CM

## 2025-05-09 DIAGNOSIS — R70.0 ELEVATED SED RATE: ICD-10-CM

## 2025-05-09 DIAGNOSIS — R22.0 SUBMANDIBULAR SWELLING: ICD-10-CM

## 2025-05-09 DIAGNOSIS — Z85.528 HISTORY OF RENAL CELL CANCER: ICD-10-CM

## 2025-05-09 DIAGNOSIS — Z85.528 HISTORY OF RENAL CELL CANCER: Primary | ICD-10-CM

## 2025-05-09 LAB — RHEUMATOID FACT SERPL-ACNC: <10 IU/ML

## 2025-05-09 PROCEDURE — 99205 OFFICE O/P NEW HI 60 MIN: CPT | Performed by: INTERNAL MEDICINE

## 2025-05-09 PROCEDURE — 82784 ASSAY IGA/IGD/IGG/IGM EACH: CPT

## 2025-05-09 PROCEDURE — 82164 ANGIOTENSIN I ENZYME TEST: CPT

## 2025-05-09 PROCEDURE — 86021 WBC ANTIBODY IDENTIFICATION: CPT

## 2025-05-09 PROCEDURE — 86036 ANCA SCREEN EACH ANTIBODY: CPT

## 2025-05-09 PROCEDURE — 86431 RHEUMATOID FACTOR QUANT: CPT

## 2025-05-09 PROCEDURE — 82787 IGG 1 2 3 OR 4 EACH: CPT

## 2025-05-09 PROCEDURE — 86038 ANTINUCLEAR ANTIBODIES: CPT

## 2025-05-09 PROCEDURE — 36415 COLL VENOUS BLD VENIPUNCTURE: CPT

## 2025-05-09 PROCEDURE — 86200 CCP ANTIBODY: CPT

## 2025-05-09 RX ORDER — DOCUSATE SODIUM 100 MG/1
100 CAPSULE, LIQUID FILLED ORAL
COMMUNITY

## 2025-05-09 RX ORDER — LOSARTAN POTASSIUM 100 MG/1
100 TABLET ORAL DAILY
COMMUNITY

## 2025-05-09 RX ORDER — LORATADINE 10 MG/1
10 TABLET ORAL
COMMUNITY

## 2025-05-09 RX ORDER — DULAGLUTIDE 3 MG/.5ML
INJECTION, SOLUTION SUBCUTANEOUS
COMMUNITY

## 2025-05-09 RX ORDER — METFORMIN HYDROCHLORIDE 500 MG/1
TABLET, EXTENDED RELEASE ORAL
COMMUNITY
Start: 2025-05-07

## 2025-05-09 RX ORDER — DEXAMETHASONE 2 MG/1
TABLET ORAL
COMMUNITY
Start: 2025-04-10

## 2025-05-09 NOTE — PROGRESS NOTES
Name: Guillaume Renee      : 1970      MRN: [id number]  Encounter Provider: Mariano Bone MD  Encounter Date: 2025   Encounter department: [facility] RHEUMATOLOG[facility]  :  Assessment & Plan  Submandibular swelling  This is a 53 y/o male who had episode of b/l submandibular swelling back in . Was seen per [facility] rheumatology and did not appear to have rheumatic process  + chronically elevated inflammatory markers and low titer ALISON 1:80  He has had a recurrence last month and was treated with prednisone taper per ENT with complete resolution  At this time he is asymptomatic  Mild sicca sx  Check ALISON profile, ACE level, ANCA CXR  Check CT neck  Continue to monitor for underlying inflammatory arthritis/CTD  RTC 3-4 months  Orders:    Ambulatory Referral to Rheumatology    CT soft tissue neck wo contrast; Future    ALISON by IFA Reflex for Rheumatologist; Future    RHEUMATOID FACTOR; Future    Cyclic citrul peptide antibody, IgG; Future    ANCA Screen With MPO and PR3 With Reflex To ANCA Titer; Future    IgG 1, 2, 3, and 4    Angiotensin converting enzyme; Future    XR chest pa and lateral; Future    Elevated C-reactive protein (CRP)    Orders:    Ambulatory Referral to Rheumatology    CT soft tissue neck wo contrast; Future    ALISON by IFA Reflex for Rheumatologist; Future    RHEUMATOID FACTOR; Future    Cyclic citrul peptide antibody, IgG; Future    ANCA Screen With MPO and PR3 With Reflex To ANCA Titer; Future    IgG 1, 2, 3, and 4    Angiotensin converting enzyme; Future    XR chest pa and lateral; Future      This is a Rheumatology Consult seen at the request of Dr. Cruz       History of Present Illness   HPI  Guillaume Renee is a 54 y.o. male who presents for further evaluation b/l submandibular swelling  History obtained from: patient    He has past medical history diabetes, left renal cyst, renal cell CA s/p right nephrectomy, HLD, HTN, sleep apnea, Urena's, renal stone    He has  several renal issues including proteinuria ( ?Diabetic nephropathy), right nephrectomy closely follows with renal at [facility]    He has had 2 episodes submandibular neck swelling. First episode was 2022. Was seen per [facility] rheumatology and w/u was overall negative    Low titer ALISON 1:80    He had recurrence last month which has completely resolved with course prednisone    Was seen per outside Allergist and told allergic to dust mites    Occasional dry eyes and dry mouth, no raynaud's,     + eczema /psoriasis     Review of Systems  Pertinent Medical History         --------------------------------------------------------------------------------------------------------        ROS:      - for: Fevers, Chills or sweats.  No HAs or scalp tenderness.  No jaw claudication.  No acute visual or eye changes.  No dry eyes.  No auditory complaints.  No oral lesions or ulcers.  No dry mouth.  No sore throat or cough.  No chest pains or palpitations.  No shortness of breath, dyspnea on exertion or wheezing.  No hemotpysis.  No abdominal pain, GERD symptoms, diarrhea or constipation.  No urinary complaints.  No numbness, tingling or weakness.  No rashes.    All other ROS was reviewed and negative except as above         --------------------------------------------------------------------------------------------------------    Past Medical History    Past Medical History:   Diagnosis Date    Urena's esophagus without dysplasia 05/2022    Eczema     Eosinophilic esophagitis     History of kidney cancer     2007    Hyperlipidemia     Hypertension     Kidney stone     Renal disorder     Sleep apnea            Past Surgical History    Past Surgical History:   Procedure Laterality Date    COLONOSCOPY  08/04/2015    Normal by Dr. Helm at [facility]    COLONOSCOPY  12/16/2016    Diverticulosis otherwise normal by Satish Garcia    COLONOSCOPY  05/14/2021    normal by Dr. Jennings    EGD  05/2022    Dr Jennings.  Irregular Z line, biopsy showed very early stage Urena's esophagus, normal esophagus, negative H. pylori.  Repeat EGD 1 year.    KIDNEY STONE SURGERY      STONE REMOVAL    NEPHRECTOMY Right     Renal cell carcinoma    UPPER GASTROINTESTINAL ENDOSCOPY  08/2020    Biopsy positive for eosinophilic esophagitis. Dr Jennings. Esophagus normal, empiric dilated with [address].  Negative H. pylori.  Ref to ENT.           Family History    Family History   Problem Relation Age of Onset    Hypertension Mother     Lymphoma Mother     Hyperlipidemia Mother     Seizures Mother     Diabetes Sister     Diabetes Sister     Diabetes Brother     Eczema Niece             Social History    Social History     Tobacco Use    Smoking status: Former     Types: Cigarettes    Smokeless tobacco: Never    Tobacco comments:     quit smoking 9 months ago   Vaping Use    Vaping status: Never Used   Substance Use Topics    Alcohol use: Never    Drug use: Not Currently     Worked at [employer]    Allergies    Allergies   Allergen Reactions    Sulfamethoxazole-Trimethoprim      Throat swelling      Latex Dermatitis         Medications    Current Outpatient Medications   Medication Instructions    ascorbic acid (VITAMIN C) 500 mg, Daily    aspirin 81 mg chewable tablet Chew    atorvastatin (LIPITOR) 40 mg, Daily    Azelastine HCl 137 MCG/SPRAY SOLN As needed    butalbital-acetaminophen-caffeine (FIORICET,ESGIC) -40 mg per tablet 1 tablet, Oral, Every 6 hours PRN    carvedilol (COREG) 25 mg, Oral, 2 times daily with meals    dexamethasone (DECADRON) 2 mg tablet     docusate sodium (COLACE) 100 mg    famotidine (PEPCID) 20 mg, Daily    fluticasone (FLONASE) 50 mcg/act nasal spray 2 sprays, Nasal, Daily    gabapentin (NEURONTIN) 300 mg, Daily    hydrocortisone (ANUSOL-HC) 2.5 % rectal cream Topical, 2 times daily    linaCLOtide (Linzess) 290 MCG CAPS 1 capsule, Oral, Daily    loratadine (CLARITIN) 10 mg    losartan (COZAAR) 100 mg, Daily     "losartan-hydrochlorothiazide (HYZAAR) 100-12.5 MG per tablet 1 tablet, Oral, Daily, Resume on 4/14    metFORMIN (GLUCOPHAGE-XR) 500 mg 24 hr tablet TAKE 2 TABLETS(1000 MG) BY MOUTH DAILY WITH BREAKFAST    methylPREDNISolone 4 MG tablet therapy pack Use as directed on package    nortriptyline (PAMELOR) 50 mg, Daily at bedtime    spironolactone (ALDACTONE) 50 mg, Oral, Daily, Resume on 4/14    triamcinolone (KENALOG) 0.1 % ointment Topical, 2 times daily    Trulicity 3 MG/0.5ML SOAJ ADMINISTER 3 MG UNDER THE SKIN EVERY 7 DAYS                ________________________________________________________________________    Results Review      ALISON PROFILE, COMPREHENSIVE  Order: [id number]  Component  Ref Range & Units 8/22/22 10:49 AM   dsDNA AutoAb  <10 TITER Negative   SSA (Ro) AutoAb  <20 THEO Unit <20   SSB (La) AutoAb  <20 THEO Unit <20   SM/RNP AutoAb  Negative Negative   Sm (Smith) AutoAb  Negative Negative   SCL-70 AutoAb  Negative Negative   Antinuclear Abs  Absent Present Abnormal    Comment: ALISON Testing Performed by Immunofluorescent Antibody   Cytoplasmic  <80 titer 80 High                        Objective   /82   Pulse 94   Ht 5' 8\" (1.727 m)   Wt (!) 143 kg (314 lb 6.4 oz)   SpO2 95%   BMI 47.80 kg/m²      Physical Exam    GEN: AAO, No apparent distress.  Patient is well developed.  HEENT:  Pupils are equal, round and reactive.  Sclera are clear.  Fundoscopic exam is normal.  External ears are without lesions.  Oral pharynx is clear of ulcers or other lesions.  MMM.   NECK:  Supple.  There is no adenopathy appreciable in anterior or posterior cervical chains or supraclavicularly.  JVP is normal.    HEART: Regular rate and rhythm.  There is no appreciable murmur, gallop or rub.  LUNGS: Clear to auscultation.  ABD:  Soft, without tenderness, rebound or guarding.  No appreciable organomegally.  NEURO: Speech and cognition are normal.  Strength is 5/5 throughout.  Tone is normal.  DTRs are 2/4 at the " knees, ankles and elbows.  Gait is normal.  SKIN: There are no rashes or lesions    MUSCULOSKELETAL:   -Hands: normal  -Wrists: normal  -Elbows: normal  -Shoulders: normal  -Neck: normal  -Back: normal  -Hips: normal  -Knees: normal  -Ankles: normal  -Feet: normal    Thank you for involving me in this patient's care.        Mariano Bone MD  [facility] Rheumatology        I have spent a total time of 66 minutes in caring for this patient on the day of the visit/encounter including Diagnostic results, Prognosis, Risk factor reductions, Impressions, Counseling / Coordination of care, Documenting in the medical record, Reviewing/placing orders in the medical record (including tests, medications, and/or procedures), and Obtaining or reviewing history  .

## 2025-05-12 LAB
CCP AB SER IA-ACNC: 1.2 (ref ?–10)
IGG SERPL-MCNC: 1160 MG/DL (ref 603–1613)
IGG1 SER-MCNC: 653 MG/DL (ref 248–810)
IGG2 SER-MCNC: 342 MG/DL (ref 130–555)
IGG3 SER-MCNC: 95 MG/DL (ref 15–102)
IGG4 SER-MCNC: 29 MG/DL (ref 2–96)

## 2025-05-13 LAB
ACE SERPL-CCNC: 14 U/L (ref 14–82)
ANA SER QL IF: NEGATIVE

## 2025-05-16 ENCOUNTER — HOSPITAL ENCOUNTER (OUTPATIENT)
Dept: CT IMAGING | Facility: HOSPITAL | Age: 55
End: 2025-05-16
Attending: INTERNAL MEDICINE
Payer: COMMERCIAL

## 2025-05-16 DIAGNOSIS — R79.82 ELEVATED C-REACTIVE PROTEIN (CRP): ICD-10-CM

## 2025-05-16 DIAGNOSIS — R22.0 SUBMANDIBULAR SWELLING: ICD-10-CM

## 2025-05-16 DIAGNOSIS — R70.0 ELEVATED SED RATE: ICD-10-CM

## 2025-05-16 DIAGNOSIS — Z85.528 HISTORY OF RENAL CELL CANCER: ICD-10-CM

## 2025-05-16 DIAGNOSIS — R22.1 SUBMANDIBULAR SWELLING: ICD-10-CM

## 2025-05-16 LAB
ANCA AB SER QL: NEGATIVE
MYELOPEROXIDASE AB SER IA-ACNC: <1 AI
PROTEINASE3 AB SER IA-ACNC: <1 AI

## 2025-05-16 PROCEDURE — 70490 CT SOFT TISSUE NECK W/O DYE: CPT
